# Patient Record
Sex: FEMALE | Race: BLACK OR AFRICAN AMERICAN | Employment: UNEMPLOYED | URBAN - METROPOLITAN AREA
[De-identification: names, ages, dates, MRNs, and addresses within clinical notes are randomized per-mention and may not be internally consistent; named-entity substitution may affect disease eponyms.]

---

## 2017-01-13 ENCOUNTER — OFFICE VISIT (OUTPATIENT)
Dept: FAMILY MEDICINE CLINIC | Age: 62
End: 2017-01-13

## 2017-01-13 VITALS
DIASTOLIC BLOOD PRESSURE: 82 MMHG | BODY MASS INDEX: 36.82 KG/M2 | HEIGHT: 65 IN | WEIGHT: 221 LBS | HEART RATE: 106 BPM | SYSTOLIC BLOOD PRESSURE: 140 MMHG | TEMPERATURE: 98.4 F | RESPIRATION RATE: 16 BRPM | OXYGEN SATURATION: 98 %

## 2017-01-13 DIAGNOSIS — Z23 ENCOUNTER FOR IMMUNIZATION: ICD-10-CM

## 2017-01-13 DIAGNOSIS — E55.9 HYPOVITAMINOSIS D: ICD-10-CM

## 2017-01-13 DIAGNOSIS — Z12.4 PAP SMEAR FOR CERVICAL CANCER SCREENING: ICD-10-CM

## 2017-01-13 DIAGNOSIS — E11.42 TYPE 2 DIABETES MELLITUS WITH DIABETIC POLYNEUROPATHY, WITH LONG-TERM CURRENT USE OF INSULIN (HCC): ICD-10-CM

## 2017-01-13 DIAGNOSIS — Z79.4 TYPE 2 DIABETES MELLITUS WITH DIABETIC POLYNEUROPATHY, WITH LONG-TERM CURRENT USE OF INSULIN (HCC): ICD-10-CM

## 2017-01-13 DIAGNOSIS — I10 ESSENTIAL HYPERTENSION: ICD-10-CM

## 2017-01-13 DIAGNOSIS — E11.9 DIABETES MELLITUS WITHOUT COMPLICATION (HCC): Primary | ICD-10-CM

## 2017-01-13 DIAGNOSIS — E78.00 PURE HYPERCHOLESTEROLEMIA: ICD-10-CM

## 2017-01-13 LAB — HBA1C MFR BLD HPLC: 7.3 %

## 2017-01-13 RX ORDER — SIMVASTATIN 10 MG/1
10 TABLET, FILM COATED ORAL
Qty: 90 TAB | Refills: 1 | Status: SHIPPED | OUTPATIENT
Start: 2017-01-13 | End: 2017-06-22 | Stop reason: SDUPTHER

## 2017-01-13 RX ORDER — METFORMIN HYDROCHLORIDE 1000 MG/1
1000 TABLET ORAL 2 TIMES DAILY WITH MEALS
Qty: 180 TAB | Refills: 1 | Status: SHIPPED | OUTPATIENT
Start: 2017-01-13 | End: 2017-06-22 | Stop reason: ALTCHOICE

## 2017-01-13 RX ORDER — PAROXETINE HYDROCHLORIDE 40 MG/1
40 TABLET, FILM COATED ORAL DAILY
Qty: 90 TAB | Refills: 1 | Status: SHIPPED | OUTPATIENT
Start: 2017-01-13 | End: 2017-06-22 | Stop reason: ALTCHOICE

## 2017-01-13 RX ORDER — AMITRIPTYLINE HYDROCHLORIDE 100 MG/1
100 TABLET, FILM COATED ORAL
Qty: 90 TAB | Refills: 1 | Status: SHIPPED | OUTPATIENT
Start: 2017-01-13 | End: 2017-06-22 | Stop reason: ALTCHOICE

## 2017-01-13 RX ORDER — MELOXICAM 15 MG/1
TABLET ORAL
Qty: 90 TAB | Refills: 1 | Status: SHIPPED | OUTPATIENT
Start: 2017-01-13 | End: 2017-06-22 | Stop reason: ALTCHOICE

## 2017-01-13 RX ORDER — GLIPIZIDE 10 MG/1
TABLET, FILM COATED, EXTENDED RELEASE ORAL
Qty: 180 TAB | Refills: 1 | Status: SHIPPED | OUTPATIENT
Start: 2017-01-13 | End: 2017-06-22 | Stop reason: SDUPTHER

## 2017-01-13 RX ORDER — QUETIAPINE FUMARATE 50 MG/1
TABLET, FILM COATED ORAL
Qty: 90 TAB | Refills: 1 | Status: SHIPPED | OUTPATIENT
Start: 2017-01-13 | End: 2017-06-22 | Stop reason: SDUPTHER

## 2017-01-13 RX ORDER — INSULIN GLARGINE 100 [IU]/ML
38 INJECTION, SOLUTION SUBCUTANEOUS DAILY
Qty: 30 ML | Refills: 3 | Status: SHIPPED | OUTPATIENT
Start: 2017-01-13 | End: 2017-06-22 | Stop reason: SDUPTHER

## 2017-01-13 RX ORDER — FENOFIBRATE 48 MG/1
145 TABLET, COATED ORAL DAILY
Qty: 90 TAB | Refills: 1 | Status: SHIPPED | OUTPATIENT
Start: 2017-01-13 | End: 2017-06-17 | Stop reason: ALTCHOICE

## 2017-01-13 RX ORDER — PANTOPRAZOLE SODIUM 40 MG/1
40 TABLET, DELAYED RELEASE ORAL DAILY
Qty: 90 TAB | Refills: 1 | Status: SHIPPED | OUTPATIENT
Start: 2017-01-13 | End: 2017-06-22 | Stop reason: SDUPTHER

## 2017-01-13 RX ORDER — ISOPROPYL ALCOHOL 70 ML/100ML
SWAB TOPICAL
Qty: 200 PAD | Refills: 2 | Status: SHIPPED | OUTPATIENT
Start: 2017-01-13 | End: 2018-05-01 | Stop reason: SDUPTHER

## 2017-01-13 RX ORDER — LISINOPRIL AND HYDROCHLOROTHIAZIDE 10; 12.5 MG/1; MG/1
1 TABLET ORAL DAILY
Qty: 90 TAB | Refills: 1 | Status: SHIPPED | OUTPATIENT
Start: 2017-01-13 | End: 2017-06-22 | Stop reason: ALTCHOICE

## 2017-01-13 NOTE — MR AVS SNAPSHOT
Visit Information Date & Time Provider Department Dept. Phone Encounter #  
 1/13/2017  8:15 AM Cher Ashford, 3 [facility] [phone] [id number] Upcoming Health Maintenance Date Due Hepatitis C Screening 1955 Pneumococcal 19-64 Medium Risk (1 of 1 - PPSV23) 2/13/1976 FOBT Q 1 YEAR AGE 50-75 2/13/2007 FOOT EXAM Q1 10/7/2014 BREAST CANCER SCRN MAMMOGRAM 2/5/2015 MICROALBUMIN Q1 3/10/2015 INFLUENZA AGE 9 TO ADULT 8/1/2016 EYE EXAM RETINAL OR DILATED Q1 11/18/2016 PAP AKA CERVICAL CYTOLOGY 3/18/2017 LIPID PANEL Q1 4/18/2017 HEMOGLOBIN A1C Q6M 7/13/2017 DTaP/Tdap/Td series (2 - Td) 1/13/2027 Allergies as of 1/13/2017  Review Complete On: 1/13/2017 By: Jayjay Redding LPN Severity Noted Reaction Type Reactions Amaryl [Glimepiride]  03/22/2012    Nausea Only Penicillins  10/25/2010    Nausea and Vomiting Current Immunizations  Reviewed on 6/17/2016 Name Date Pneumococcal Polysaccharide (PPSV-23)  Incomplete Not reviewed this visit You Were Diagnosed With   
  
 Codes Comments Diabetes mellitus without complication ([facility]    -  Primary ICD-10-CM: E11.9 ICD-9-CM: 250.00 Type 2 diabetes mellitus with diabetic polyneuropathy, with long-term current use of insulin (HCC)     ICD-10-CM: E11.42, Z79.4 ICD-9-CM: 250.60, 357.2, V58.67 Essential hypertension     ICD-10-CM: I10 
ICD-9-CM: 401.9 Pure hypercholesterolemia     ICD-10-CM: E78.00 ICD-9-CM: 272.0 Pap smear for cervical cancer screening     ICD-10-CM: Z12.4 ICD-9-CM: V76.2 Encounter for immunization     ICD-10-CM: C37 ICD-9-CM: V03.89 Vitals BP Pulse Temp Resp Height(growth percentile) Weight(growth percentile) 140/82 (!) 106 98.4 °F (36.9 °C) 16 5' 5\" (1.651 m) 221 lb (100.2 kg) SpO2 BMI OB Status Smoking Status 98% 36.78 kg/m2 Postmenopausal Current Every Day Smoker Vitals History BMI and BSA Data Body Mass Index Body Surface Area 36.78 kg/m 2 2.14 m 2 Preferred Pharmacy Pharmacy Name Phone Gaby Flores [address], [address] [phone] Your Updated Medication List  
  
   
This list is accurate as of: 1/13/17  8:46 AM.  Always use your most recent med list.  
  
  
  
  
 alcohol swabs Padm Commonly known as:  ALCOHOL PADS Dispense 1 box 200 count. Dx 250.00. Twice daily use  
  
 amitriptyline 100 mg tablet Commonly known as:  ELAVIL Take 1 Tab by mouth nightly. fenofibrate nanocrystallized 48 mg tablet Commonly known as:  [employer] Take 3 Tabs by mouth daily. glipiZIDE SR 10 mg CR tablet Commonly known as:  GLUCOTROL XL  
TAKE 1 TABLET BY MOUTH TWICE DAILY  
  
 insulin glargine 100 unit/mL (3 mL) pen Commonly known as:  LANTUS SOLOSTAR  
38 Units by SubCUTAneous route daily. Insulin Needles (Disposable) 32 gauge x 5/32\" Ndle Commonly known as:  Rosalva Pen Needle For once daily use  
  
 lisinopril-hydroCHLOROthiazide 10-12.5 mg per tablet Commonly known as:  Helyn Blinks Take 1 Tab by mouth daily. meloxicam 15 mg tablet Commonly known as:  MOBIC  
TAKE 1 TABLET BY MOUTH DAILY  
  
 metFORMIN 1,000 mg tablet Commonly known as:  GLUCOPHAGE Take 1 Tab by mouth two (2) times daily (with meals). pantoprazole 40 mg tablet Commonly known as:  PROTONIX Take 1 Tab by mouth daily. PARoxetine 40 mg tablet Commonly known as:  PAXIL Take 1 Tab by mouth daily. QUEtiapine 50 mg tablet Commonly known as:  SEROquel TAKE 1 TABLET BY MOUTH EVERY EVENING AS NEEDED  
  
 simvastatin 10 mg tablet Commonly known as:  ZOCOR Take 1 Tab by mouth nightly. VITAMIN D3 1,000 unit tablet Generic drug:  cholecalciferol Take  by mouth daily. Prescriptions Sent to Pharmacy Refills glipiZIDE SR (GLUCOTROL XL) 10 mg CR tablet 1 Sig: TAKE 1 TABLET BY MOUTH TWICE DAILY Class: Normal  
 Pharmacy: [facility] Ph #: [phone]  
 meloxicam (MOBIC) 15 mg tablet 1 Sig: TAKE 1 TABLET BY MOUTH DAILY Class: Normal  
 Pharmacy: [facility] [address] [place] [address] Rd, [address] [place] [address] Ph #: [phone] QUEtiapine (SEROQUEL) 50 mg tablet 1 Sig: TAKE 1 TABLET BY MOUTH EVERY EVENING AS NEEDED Class: Normal  
 Pharmacy: [facility] [address] Ph #: [phone]  
 metFORMIN (GLUCOPHAGE) 1,000 mg tablet 1 Sig: Take 1 Tab by mouth two (2) times daily (with meals). Class: Normal  
 Pharmacy: [facility] [address] [place] [address], [address] [place] [address] Ph #: [phone] Route: Oral  
 amitriptyline (ELAVIL) 100 mg tablet 1 Sig: Take 1 Tab by mouth nightly. Class: Normal  
 Pharmacy: [facility] [address] [place] [address], [address] [place] [address] Ph #: [phone] Route: Oral  
 PARoxetine (PAXIL) 40 mg tablet 1 Sig: Take 1 Tab by mouth daily. Class: Normal  
 Pharmacy: [facility] [address] [place] [address], [address] [place] [address] Ph #: [phone] Route: Oral  
 lisinopril-hydroCHLOROthiazide (PRINZIDE, ZESTORETIC) 10-12.5 mg per tablet 1 Sig: Take 1 Tab by mouth daily. Class: Normal  
 Pharmacy: [facility] [address] [place] [address], [address] [place] [address] Ph #: [phone] Route: Oral  
 fenofibrate nanocrystallized (TRICOR) 48 mg tablet 1 Sig: Take 3 Tabs by mouth daily.   
 Class: Normal  
 Pharmacy: [facility] [address] [place] [address] Rd, [address] [place] [address] Ph #: [phone] Route: Oral  
 simvastatin (ZOCOR) 10 mg tablet 1 Sig: Take 1 Tab by mouth nightly. Class: Normal  
 Pharmacy: [facility] [address] [place] [address] Rd, [address] [place] [address] Ph #: [phone] Route: Oral  
 alcohol swabs (ALCOHOL PADS) padm 2 Sig: Dispense 1 box 200 count. Dx 250.00. Twice daily use Class: Normal  
 Pharmacy: [facility] [address], [address] [place] [address] Ph #: [phone] insulin glargine (LANTUS SOLOSTAR) 100 unit/mL (3 mL) pen 3 Si Units by SubCUTAneous route daily. Class: Normal  
 Pharmacy: [address], [address] [place] [address] Ph #: [phone] Route: SubCUTAneous  
 pantoprazole (PROTONIX) 40 mg tablet 1 Sig: Take 1 Tab by mouth daily. Class: Normal  
 Pharmacy: [address], [address] [place] [address] Ph #: [phone] Route: Oral  
  
We Performed the Following ADMIN PNEUMOCOCCAL VACCINE [ HCP] AMB POC HEMOGLOBIN A1C [82013 CPT(R)]  DIABETES FOOT EXAM [HM7 Custom] PNEUMOCOCCAL POLYSACCHARIDE VACCINE, 23-VALENT, ADULT OR IMMUNOSUPPRESSED PT DOSE, [16561 CPT(R)] REFERRAL TO OBSTETRICS AND GYNECOLOGY [REF51 Custom] REFERRAL TO OPHTHALMOLOGY [REF57 Custom] Comments: PLEASE GET NOTE FROM [facility] Referral Information Referral ID Referred By Referred To  
  
 [id number] Caryn ERWIN Not Available Visits Status Start Date End Date 1 New Request 17 If your referral has a status of pending review or denied, additional information will be sent to support the outcome of this decision. Referral ID Referred By Referred To  
 [id number] Sandoval ERWIN Not Available Visits Status Start Date End Date 1 New Request 1/13/17 1/13/18 If your referral has a status of pending review or denied, additional information will be sent to support the outcome of this decision. Introducing [facility]! Scooby Reich introduces Partly patient portal. Now you can access parts of your medical record, email your doctor's office, and request medication refills online. 1. In your internet browser, go to [email or web address] 2. Click on the First Time User? Click Here link in the Sign In box. You will see the New Member Sign Up page. 3. Enter your Partly Access Code exactly as it appears below. You will not need to use this code after youve completed the sign-up process. If you do not sign up before the expiration date, you must request a new code. · Partly Access Code: [email or web address] Expires: 4/13/2017  8:46 AM 
 
4. Enter the last four digits of your Social Security Number (xxxx) and Date of Birth (mm/dd/yyyy) as indicated and click Submit. You will be taken to the next sign-up page. 5. Create a Partly ID. This will be your Partly login ID and cannot be changed, so think of one that is secure and easy to remember. 6. Create a Partly password. You can change your password at any time. 7. Enter your Password Reset Question and Answer. This can be used at a later time if you forget your password. 8. Enter your e-mail address. You will receive e-mail notification when new information is available in [address] 9. Click Sign Up. You can now view and download portions of your medical record. 10. Click the Download Summary menu link to download a portable copy of your medical information. If you have questions, please visit the Frequently Asked Questions section of the Partly website.  Remember, Partly is NOT to be used for urgent needs. For medical emergencies, dial 911. Now available from your iPhone and Android! Please provide this summary of care documentation to your next provider. Your primary care clinician is listed as Kyung Vo. If you have any questions after today's visit, please call [phone].

## 2017-01-13 NOTE — PROGRESS NOTES
Asim Linton is a 63 y.o. female here today with complaints of rectal and vaginal pressure. 1. Have you been to the ER, urgent care clinic since your last visit? Hospitalized since your last visit? No    2. Have you seen or consulted any other health care providers outside of the [address] [place] [address] since your last visit? Include any pap smears or colon screening.  No

## 2017-01-13 NOTE — PATIENT INSTRUCTIONS

## 2017-01-13 NOTE — PROGRESS NOTES
Assessment/Plan:    *Diagnoses and all orders for this visit:    Diabetes mellitus without complication (HCC)  -     AMB POC HEMOGLOBIN A1C  -     glipiZIDE SR (GLUCOTROL XL) 10 mg CR tablet; TAKE 1 TABLET BY MOUTH TWICE DAILY  -     metFORMIN (GLUCOPHAGE) 1,000 mg tablet; Take 1 Tab by mouth two (2) times daily (with meals). -     alcohol swabs (ALCOHOL PADS) padm; Dispense 1 box 200 count. Dx 250.00. Twice daily use  -     insulin glargine (LANTUS SOLOSTAR) 100 unit/mL (3 mL) pen; 38 Units by SubCUTAneous route daily. -      DIABETES FOOT EXAM  -     REFERRAL TO OPHTHALMOLOGY  -     CBC WITH AUTOMATED DIFF; Future  -     HEPATIC FUNCTION PANEL; Future  -     LIPID PANEL; Future  -     METABOLIC PANEL, BASIC; Future  -     TSH 3RD GENERATION; Future  -     T4, FREE; Future  -     URINALYSIS W/ RFLX MICROSCOPIC; Future  -     HEMOGLOBIN A1C W/O EAG; Future  -     MICROALBUMIN, UR, RAND W/ MICROALBUMIN/CREA RATIO; Future    Type 2 diabetes mellitus with diabetic polyneuropathy, with long-term current use of insulin (HCC)    Essential hypertension    Pure hypercholesterolemia  -     fenofibrate nanocrystallized (TRICOR) 48 mg tablet; Take 3 Tabs by mouth daily. -     simvastatin (ZOCOR) 10 mg tablet; Take 1 Tab by mouth nightly. Pap smear for cervical cancer screening  -     REFERRAL TO OBSTETRICS AND GYNECOLOGY    Encounter for immunization  -     Pneumococcal Polysaccharide vaccine, 23-Valent, Adult or Immunocompromised  -     ADMIN PNEUMOCOCCAL VACCINE  Medicare Injection Admin Charge    Hypovitaminosis D  -     VITAMIN D, 25 HYDROXY; Future    Other orders  -     meloxicam (MOBIC) 15 mg tablet; TAKE 1 TABLET BY MOUTH DAILY  -     QUEtiapine (SEROQUEL) 50 mg tablet; TAKE 1 TABLET BY MOUTH EVERY EVENING AS NEEDED  -     amitriptyline (ELAVIL) 100 mg tablet; Take 1 Tab by mouth nightly. -     PARoxetine (PAXIL) 40 mg tablet; Take 1 Tab by mouth daily.   -     lisinopril-hydroCHLOROthiazide (PRINZIDE, ZESTORETIC) 10-12.5 mg per tablet; Take 1 Tab by mouth daily. -     pantoprazole (PROTONIX) 40 mg tablet; Take 1 Tab by mouth daily.  -     Ferrous Sulfate (SLOW FE) 47.5 mg iron TbER tablet; Take 1 Tab by mouth daily. Physical in June. BW. The plan was discussed with the patient. The patient verbalized understanding and is in agreement with the plan. All medication potential side effects were discussed with the patient.    -------------------------------------------------------------------------------------------------------------------        Ad Pierce is a 63 y.o. female and presents with No chief complaint on file. Subjective:  Pt here for f/u. DM: so much better, A1c in office at 7.3%. HTN: stable. HLD: controlled on last BW. ROS:  Constitutional: No recent weight change. No weakness/fatigue. No f/c. Skin: No rashes, change in nails/hair, itching   HENT: No HA, dizziness. No hearing loss/tinnitus. No nasal congestion/discharge. Eyes: No change in vision, double/blurred vision or eye pain/redness. Cardiovascular: No CP/palpitations. No CORADO/orthopnea/PND. Respiratory: No cough/sputum, dyspnea, wheezing. Gastointestinal: No dysphagia, reflux. No n/v. No constipation/diarrhea. No melena/rectal bleeding. Genitourinary: No dysuria, urinary hesitancy, nocturia, hematuria. No incontinence. Musculoskeletal: No joint pain/stiffness. No muscle pain/tenderness. Endo: No heat/cold intolerance, no polyuria/polydypsia. Heme: No h/o anemia. No easy bleeding/bruising. Allergy/Immunology: No seasonal rhinitis. Denies frequent colds, sinus/ear infections. Neurological: No seizures/numbness/weakness. No paresthesias. Psychiatric:  No depression, anxiety. The problem list was updated as a part of today's visit.   Patient Active Problem List   Diagnosis Code    HTN (hypertension) I10    Insomnia G47.00    GERD (gastroesophageal reflux disease) K21.9  Schizophrenia ([facility] F20.9    DM (diabetes mellitus) ([facility] E11.9    Hyperlipidemia E78.5    Diabetic retinopathy ([facility] E11.319    Cataract H26.9    Glaucoma H40.9    Sleep apnea, obstructive G47.33    Diabetic neuropathy (HCC) E11.40    Heel spur M77.30    Depression F32.9       The PSH, FH were reviewed. SH:  Social History   Substance Use Topics    Smoking status: Current Every Day Smoker     Packs/day: 0.50     Years: 21.00     Types: Cigarettes    Smokeless tobacco: Current User    Alcohol use No       Medications/Allergies:  Current Outpatient Prescriptions on File Prior to Visit   Medication Sig Dispense Refill    cholecalciferol, vitamin d3, (VITAMIN D) 1,000 unit tablet Take  by mouth daily.  Insulin Needles, Disposable, (KENDALL PEN NEEDLE) 32 gauge x 5/32\" ndle For once daily use 100 Pen Needle 1     No current facility-administered medications on file prior to visit. Allergies   Allergen Reactions    Amaryl [Glimepiride] Nausea Only    Penicillins Nausea and Vomiting         Health Maintenance:   Health Maintenance   Topic Date Due    Hepatitis C Screening  1955    Pneumococcal 19-64 Medium Risk (1 of 1 - PPSV23) 02/13/1976    FOBT Q 1 YEAR AGE 50-75  02/13/2007    FOOT EXAM Q1  10/07/2014    BREAST CANCER SCRN MAMMOGRAM  02/05/2015    MICROALBUMIN Q1  03/10/2015    INFLUENZA AGE 9 TO ADULT  08/01/2016    EYE EXAM RETINAL OR DILATED Q1  11/18/2016    PAP AKA CERVICAL CYTOLOGY  03/18/2017    LIPID PANEL Q1  04/18/2017    HEMOGLOBIN A1C Q6M  07/13/2017    DTaP/Tdap/Td series (2 - Td) 01/13/2027       Objective:  Visit Vitals    /82    Pulse (!) 106    Temp 98.4 °F (36.9 °C)    Resp 16    Ht 5' 5\" (1.651 m)    Wt 221 lb (100.2 kg)    SpO2 98%    BMI 36.78 kg/m2          Nurses notes and VS reviewed.       Physical Examination: General appearance - alert, well appearing, and in no distress  Chest - clear to auscultation, no wheezes, rales or rhonchi, symmetric air entry  Heart - normal rate, regular rhythm, normal S1, S2, no murmurs, rubs, clicks or gallops  Abdomen - soft, nontender, nondistended, no masses or organomegaly  Extremities - peripheral pulses normal, no pedal edema, no clubbing or cyanosis  Diabetic foot exam:     Left:    Filament test normal sensation with micro filament   Pulse DP: 2+ (normal)   Pulse PT: 2+ (normal)   Deformities: None  Right:    Filament test normal sensation with micro filament   Pulse DP: 2+ (normal)   Pulse PT: 2+ (normal)   Deformities: None        Labwork and Ancillary Studies:    CBC w/Diff  Lab Results   Component Value Date/Time    WBC 8.7 04/18/2016 11:55 AM    HGB 11.9 04/18/2016 11:55 AM    PLATELET 979 15/01/3065 11:55 AM         Basic Metabolic Profile  Lab Results   Component Value Date/Time    Sodium 137 04/18/2016 11:55 AM    Potassium 4.4 04/18/2016 11:55 AM    Chloride 98 04/18/2016 11:55 AM    CO2 27 04/18/2016 11:55 AM    Anion gap 12 04/18/2016 11:55 AM    Glucose 212 04/18/2016 11:55 AM    BUN 20 04/18/2016 11:55 AM    Creatinine 1.15 04/18/2016 11:55 AM    BUN/Creatinine ratio 17 04/18/2016 11:55 AM    GFR est AA 59 04/18/2016 11:55 AM    GFR est non-AA 48 04/18/2016 11:55 AM    Calcium 9.0 04/18/2016 11:55 AM         LFT  Lab Results   Component Value Date/Time    ALT 20 04/18/2016 11:55 AM    AST 13 04/18/2016 11:55 AM    Alk.  phosphatase 105 04/18/2016 11:55 AM    Bilirubin, direct <0.1 04/18/2016 11:55 AM    Bilirubin, total 0.2 04/18/2016 11:55 AM         Cholesterol  Lab Results   Component Value Date/Time    Cholesterol, total 148 04/18/2016 11:55 AM    HDL Cholesterol 40 04/18/2016 11:55 AM    LDL, calculated 55.6 04/18/2016 11:55 AM    Triglyceride 262 04/18/2016 11:55 AM    CHOL/HDL Ratio 3.7 04/18/2016 11:55 AM

## 2017-06-14 ENCOUNTER — TELEPHONE (OUTPATIENT)
Dept: FAMILY MEDICINE CLINIC | Age: 62
End: 2017-06-14

## 2017-06-14 ENCOUNTER — OFFICE VISIT (OUTPATIENT)
Dept: FAMILY MEDICINE CLINIC | Age: 62
End: 2017-06-14

## 2017-06-14 VITALS
BODY MASS INDEX: 36.61 KG/M2 | DIASTOLIC BLOOD PRESSURE: 56 MMHG | OXYGEN SATURATION: 98 % | SYSTOLIC BLOOD PRESSURE: 100 MMHG | HEIGHT: 63 IN | TEMPERATURE: 98.1 F | RESPIRATION RATE: 16 BRPM | WEIGHT: 206.6 LBS | HEART RATE: 90 BPM

## 2017-06-14 DIAGNOSIS — Z12.39 SCREENING FOR MALIGNANT NEOPLASM OF BREAST: ICD-10-CM

## 2017-06-14 DIAGNOSIS — E11.42 TYPE 2 DIABETES MELLITUS WITH DIABETIC POLYNEUROPATHY, WITH LONG-TERM CURRENT USE OF INSULIN (HCC): ICD-10-CM

## 2017-06-14 DIAGNOSIS — Z12.11 COLON CANCER SCREENING: ICD-10-CM

## 2017-06-14 DIAGNOSIS — Z79.4 TYPE 2 DIABETES MELLITUS WITH DIABETIC POLYNEUROPATHY, WITH LONG-TERM CURRENT USE OF INSULIN (HCC): ICD-10-CM

## 2017-06-14 DIAGNOSIS — Z13.6 SCREENING FOR ISCHEMIC HEART DISEASE: ICD-10-CM

## 2017-06-14 DIAGNOSIS — Z12.4 SCREENING FOR MALIGNANT NEOPLASM OF CERVIX: ICD-10-CM

## 2017-06-14 DIAGNOSIS — Z00.00 ROUTINE GENERAL MEDICAL EXAMINATION AT A HEALTH CARE FACILITY: Primary | ICD-10-CM

## 2017-06-14 DIAGNOSIS — E78.00 PURE HYPERCHOLESTEROLEMIA: ICD-10-CM

## 2017-06-14 DIAGNOSIS — I10 ESSENTIAL HYPERTENSION: ICD-10-CM

## 2017-06-14 DIAGNOSIS — Z78.0 POSTMENOPAUSAL: ICD-10-CM

## 2017-06-14 DIAGNOSIS — Z13.820 OSTEOPOROSIS SCREENING: ICD-10-CM

## 2017-06-14 DIAGNOSIS — Z13.39 SCREENING FOR ALCOHOLISM: ICD-10-CM

## 2017-06-14 LAB
MICROALBUMIN UR TEST STR-MCNC: 150 MG/L
MICROALBUMIN/CREAT RATIO POC: NORMAL MG/G

## 2017-06-14 RX ORDER — GEMFIBROZIL 600 MG/1
600 TABLET, FILM COATED ORAL 2 TIMES DAILY
COMMUNITY
End: 2017-06-22 | Stop reason: ALTCHOICE

## 2017-06-14 NOTE — PROGRESS NOTES
This is a Subsequent Medicare Annual Wellness Visit providing Personalized Prevention Plan Services (PPPS) (Performed 12 months after initial AWV and PPPS )    I have reviewed the patient's medical history in detail and updated the computerized patient record. History     Past Medical History:   Diagnosis Date    Cataract     Depression 1/13/2015    Diabetes ([facility]     Diabetic neuropathy ([facility] 9/1/2013    Diabetic retinopathy ([facility] 3/2/2012    DM (diabetes mellitus) ([facility] 3/2/2012    GERD (gastroesophageal reflux disease) 4/21/2011    Glaucoma     Heel spur 11/24/2014    HTN (hypertension) 4/21/2011    Hyperlipidemia 3/2/2012    Insomnia 4/21/2011    Schizophrenia ([facility] 4/21/2011    Sleep apnea, obstructive 9/1/2013      Past Surgical History:   Procedure Laterality Date    BIOPSY OF BREAST, INCISIONAL  1970    left benign    HX OPEN CHOLECYSTECTOMY       Current Outpatient Prescriptions   Medication Sig Dispense Refill    SITagliptin (JANUVIA) 100 mg tablet Take 100 mg by mouth daily.  bimatoprost (LUMIGAN) 0.01 % ophthalmic drops Administer 1 Drop to both eyes every evening.  gemfibrozil (LOPID) 600 mg tablet Take 600 mg by mouth two (2) times a day.  KENDALL PEN NEEDLE 32 gauge x 5/32\" ndle USE AS DIRECTED ONCE EVERY  Pen Needle 1    glipiZIDE SR (GLUCOTROL XL) 10 mg CR tablet TAKE 1 TABLET BY MOUTH TWICE DAILY 180 Tab 1    meloxicam (MOBIC) 15 mg tablet TAKE 1 TABLET BY MOUTH DAILY 90 Tab 1    QUEtiapine (SEROQUEL) 50 mg tablet TAKE 1 TABLET BY MOUTH EVERY EVENING AS NEEDED 90 Tab 1    metFORMIN (GLUCOPHAGE) 1,000 mg tablet Take 1 Tab by mouth two (2) times daily (with meals). 180 Tab 1    amitriptyline (ELAVIL) 100 mg tablet Take 1 Tab by mouth nightly. 90 Tab 1    PARoxetine (PAXIL) 40 mg tablet Take 1 Tab by mouth daily. 90 Tab 1    lisinopril-hydroCHLOROthiazide (PRINZIDE, ZESTORETIC) 10-12.5 mg per tablet Take 1 Tab by mouth daily.  90 Tab 1    fenofibrate nanocrystallized (TRICOR) 48 mg tablet Take 3 Tabs by mouth daily. 90 Tab 1    simvastatin (ZOCOR) 10 mg tablet Take 1 Tab by mouth nightly. 90 Tab 1    alcohol swabs (ALCOHOL PADS) padm Dispense 1 box 200 count. Dx 250.00. Twice daily use 200 Pad 2    insulin glargine (LANTUS SOLOSTAR) 100 unit/mL (3 mL) pen 38 Units by SubCUTAneous route daily. 30 mL 3    pantoprazole (PROTONIX) 40 mg tablet Take 1 Tab by mouth daily. 90 Tab 1    Ferrous Sulfate (SLOW FE) 47.5 mg iron TbER tablet Take 1 Tab by mouth daily. 90 Tab 1    cholecalciferol, vitamin d3, (VITAMIN D) 1,000 unit tablet Take  by mouth daily. Allergies   Allergen Reactions    Amaryl [Glimepiride] Nausea Only    Penicillins Nausea and Vomiting     History reviewed. No pertinent family history. Social History   Substance Use Topics    Smoking status: Current Every Day Smoker     Packs/day: 0.50     Years: 21.00     Types: Cigarettes    Smokeless tobacco: Current User    Alcohol use No     Patient Active Problem List   Diagnosis Code    HTN (hypertension) I10    Insomnia G47.00    GERD (gastroesophageal reflux disease) K21.9    Schizophrenia ([facility] F20.9    DM (diabetes mellitus) ([facility] E11.9    Hyperlipidemia E78.5    Diabetic retinopathy ([facility] E11.319    Cataract H26.9    Glaucoma H40.9    Sleep apnea, obstructive G47.33    Diabetic neuropathy (HCC) E11.40    Heel spur M77.30    Depression F32.9       Depression Risk Factor Screening:     PHQ over the last two weeks 6/14/2017   PHQ Not Done -   Little interest or pleasure in doing things Not at all   Feeling down, depressed or hopeless Not at all   Total Score PHQ 2 0     Alcohol Risk Factor Screening: On any occasion during the past 3 months, have you had more than 3 drinks containing alcohol? No    Do you average more than 7 drinks per week? No      Functional Ability and Level of Safety:     Hearing Loss   none    Activities of Daily Living   Self-care.    Requires assistance with: no ADLs    Fall Risk     Fall Risk Assessment, last 12 mths 6/14/2017   Able to walk? Yes   Fall in past 12 months? No     Abuse Screen   Patient is not abused    Review of Systems   Pertinent items are noted in HPI. Physical Examination     Evaluation of Cognitive Function:  Mood/affect:  happy  Appearance: age appropriate  Family member/caregiver input: daughter with her today. Visit Vitals    /56    Pulse 90    Temp 98.1 °F (36.7 °C)    Resp 16    Ht 5' 3\" (1.6 m)    Wt 206 lb 9.6 oz (93.7 kg)    SpO2 98%    BMI 36.6 kg/m2     General appearance: alert, cooperative, no distress, appears stated age  Lungs: clear to auscultation bilaterally  Heart: regular rate and rhythm, S1, S2 normal, no murmur, click, rub or gallop  Abdomen: soft, non-tender. Bowel sounds normal. No masses,  no organomegaly  Extremities: extremities normal, atraumatic, no cyanosis or edema  Pulses: 2+ and symmetric    Patient Care Team:  Diane Bro MD as PCP - General (Internal Medicine)    Advice/Referrals/Counseling   Education and counseling provided:  Are appropriate based on today's review and evaluation      Assessment/Plan       ICD-10-CM ICD-9-CM    1. Routine general medical examination at a health care facility Z00.00 V70.0    2. Screening for malignant neoplasm of cervix Z12.4 V76.2    3. Screening for malignant neoplasm of breast Z12.39 V76.10 VANCE MAMMO BI SCREENING INCL CAD   4. Postmenopausal Z78.0 V49.81 DEXA BONE DENSITY STUDY AXIAL   5. Osteoporosis screening Z13.820 V82.81 DEXA BONE DENSITY STUDY AXIAL   6. Colon cancer screening Z12.11 V76.51 OCCULT BLOOD, STOOL   7. Type 2 diabetes mellitus with diabetic polyneuropathy, with long-term current use of insulin (HCC) E11.42 250.60 AMB POC URINE, MICROALBUMIN, SEMIQUANTITATIVE    Z79.4 357.2 REFERRAL TO PODIATRY     V58.67    8. Essential hypertension I10 401.9    9.  Pure hypercholesterolemia E78.00 272.0    10. Screening for alcoholism Z13.89 V79.1    11. Screening for ischemic heart disease Z13.6 V81.0    . Pt will return for labs tomorrow. She was reminded again to get her eye exam done. She will make her podiatry appt.

## 2017-06-14 NOTE — MR AVS SNAPSHOT
Visit Information Date & Time Provider Department Dept. Phone Encounter #  
 6/14/2017 11:15 AM Geovanni Linares, 3 [facility] [phone] [id number] Upcoming Health Maintenance Date Due Hepatitis C Screening 1955 FOBT Q 1 YEAR AGE 50-75 2/13/2007 BREAST CANCER SCRN MAMMOGRAM 2/5/2015 MICROALBUMIN Q1 3/10/2015 EYE EXAM RETINAL OR DILATED Q1 11/18/2016 PAP AKA CERVICAL CYTOLOGY 3/18/2017 LIPID PANEL Q1 4/18/2017 HEMOGLOBIN A1C Q6M 7/13/2017 INFLUENZA AGE 9 TO ADULT 8/1/2017 FOOT EXAM Q1 1/13/2018 DTaP/Tdap/Td series (2 - Td) 1/13/2027 Allergies as of 6/14/2017  Review Complete On: 6/14/2017 By: Geovanni Linares MD  
  
 Severity Noted Reaction Type Reactions Amaryl [Glimepiride]  03/22/2012    Nausea Only Penicillins  10/25/2010    Nausea and Vomiting Current Immunizations  Reviewed on 6/17/2016 Name Date Pneumococcal Polysaccharide (PPSV-23) 1/13/2017  9:02 AM  
  
 Not reviewed this visit You Were Diagnosed With   
  
 Codes Comments Postmenopausal    -  Primary ICD-10-CM: Z78.0 ICD-9-CM: V49.81 Screening for malignant neoplasm of cervix     ICD-10-CM: Z12.4 ICD-9-CM: V76.2 Screening for malignant neoplasm of breast     ICD-10-CM: Z12.39 
ICD-9-CM: V76.10 Osteoporosis screening     ICD-10-CM: Z13.820 ICD-9-CM: V82.81 Colon cancer screening     ICD-10-CM: Z12.11 ICD-9-CM: V76.51 Type 2 diabetes mellitus with diabetic polyneuropathy, with long-term current use of insulin (HCC)     ICD-10-CM: E11.42, Z79.4 ICD-9-CM: 250.60, 357.2, V58.67 Essential hypertension     ICD-10-CM: I10 
ICD-9-CM: 401.9 Pure hypercholesterolemia     ICD-10-CM: E78.00 ICD-9-CM: 272.0 Routine general medical examination at a health care facility     ICD-10-CM: Z00.00 ICD-9-CM: V70.0 Screening for alcoholism     ICD-10-CM: Z13.89 ICD-9-CM: V79.1 Screening for ischemic heart disease     ICD-10-CM: Z13.6 ICD-9-CM: V81.0 Vitals BP Pulse Temp Resp Height(growth percentile) Weight(growth percentile) 100/56 90 98.1 °F (36.7 °C) 16 5' 3\" (1.6 m) 206 lb 9.6 oz (93.7 kg) SpO2 BMI OB Status Smoking Status 98% 36.6 kg/m2 Postmenopausal Current Every Day Smoker Vitals History BMI and BSA Data Body Mass Index Body Surface Area  
 36.6 kg/m 2 2.04 m 2 Preferred Pharmacy Pharmacy Name Phone [facility] [address], [address] [phone] Your Updated Medication List  
  
   
This list is accurate as of: 6/14/17 12:06 PM.  Always use your most recent med list.  
  
  
  
  
 alcohol swabs Padm Commonly known as:  ALCOHOL PADS Dispense 1 box 200 count. Dx 250.00. Twice daily use  
  
 amitriptyline 100 mg tablet Commonly known as:  ELAVIL Take 1 Tab by mouth nightly. bimatoprost 0.01 % ophthalmic drops Commonly known as:  LUMIGAN Administer 1 Drop to both eyes every evening. fenofibrate nanocrystallized 48 mg tablet Commonly known as:  [employer] Take 3 Tabs by mouth daily. Ferrous Sulfate 47.5 mg iron Tber tablet Commonly known as:  SLOW FE Take 1 Tab by mouth daily. gemfibrozil 600 mg tablet Commonly known as:  LOPID Take 600 mg by mouth two (2) times a day. glipiZIDE SR 10 mg CR tablet Commonly known as:  GLUCOTROL XL  
TAKE 1 TABLET BY MOUTH TWICE DAILY  
  
 insulin glargine 100 unit/mL (3 mL) Inpn Commonly known as:  LANTUS SOLOSTAR  
38 Units by SubCUTAneous route daily. JANUVIA 100 mg tablet Generic drug:  SITagliptin Take 100 mg by mouth daily. lisinopril-hydroCHLOROthiazide 10-12.5 mg per tablet Commonly known as:  Rosaland Copas Take 1 Tab by mouth daily. meloxicam 15 mg tablet Commonly known as:  MOBIC  
TAKE 1 TABLET BY MOUTH DAILY metFORMIN 1,000 mg tablet Commonly known as:  GLUCOPHAGE Take 1 Tab by mouth two (2) times daily (with meals). Rosalva Pen Needle 32 gauge x 5/32\" Ndle Generic drug:  Insulin Needles (Disposable) USE AS DIRECTED ONCE EVERY DAY  
  
 pantoprazole 40 mg tablet Commonly known as:  PROTONIX Take 1 Tab by mouth daily. PARoxetine 40 mg tablet Commonly known as:  PAXIL Take 1 Tab by mouth daily. QUEtiapine 50 mg tablet Commonly known as:  SEROquel TAKE 1 TABLET BY MOUTH EVERY EVENING AS NEEDED  
  
 simvastatin 10 mg tablet Commonly known as:  ZOCOR Take 1 Tab by mouth nightly. VITAMIN D3 1,000 unit tablet Generic drug:  cholecalciferol Take  by mouth daily. We Performed the Following AMB POC URINE, MICROALBUMIN, SEMIQUANTITATIVE [78010 CPT(R)] REFERRAL TO PODIATRY [REF90 Custom] To-Do List   
 06/14/2017 Imaging:  DEXA BONE DENSITY STUDY AXIAL   
  
 06/14/2017 Imaging:  VANCE MAMMO BI SCREENING INCL CAD   
  
 06/14/2017 Lab:  OCCULT BLOOD, STOOL Referral Information Referral ID Referred By Referred To  
  
 [id number] Isaías ERWIN Not Available Visits Status Start Date End Date 1 New Request 6/14/17 6/14/18 If your referral has a status of pending review or denied, additional information will be sent to support the outcome of this decision. Patient Instructions Medicare Part B Preventive Services Limitations Recommendation Scheduled Bone Mass Measurement 
(age 72 & older, biennial) Requires diagnosis related to osteoporosis or estrogen deficiency. Biennial benefit unless patient has history of long-term glucocorticoid tx or baseline is needed because initial test was by other method Cardiovascular Screening Blood Tests (every 5 years) Total cholesterol, HDL, Triglycerides Order as a panel if possible Colorectal Cancer Screening 
-Fecal occult blood test (annual) -Flexible sigmoidoscopy (5y) 
-Screening colonoscopy (10y) -Barium Enema Counseling to Prevent Tobacco Use (up to 8 sessions per year) - Counseling greater than 3 and up to 10 minutes - Counseling greater than 10 minutes Patients must be asymptomatic of tobacco-related conditions to receive as preventive service Diabetes Screening Tests (at least every 3 years, Medicare covers annually or at 6-month intervals for prediabetic patients) Fasting blood sugar (FBS) or glucose tolerance test (GTT) Patient must be diagnosed with one of the following: 
-Hypertension, Dyslipidemia, obesity, previous impaired FBS or GTT 
Or any two of the following: overweight, FH of diabetes, age ? 72, history of gestational diabetes, birth of baby weighing more than 9 pounds Diabetes Self-Management Training (DSMT) (no USPSTF recommendation) Requires referral by treating physician for patient with diabetes or renal disease. 10 hours of initial DSMT session of no less than 30 minutes each in a continuous 12-month period. 2 hours of follow-up DSMT in subsequent years. Glaucoma Screening (no USPSTF recommendation) Diabetes mellitus, family history, , age 48 or over,  American, age 72 or over Human Immunodeficiency Virus (HIV) Screening (annually for increased risk patients) HIV-1 and HIV-2 by EIA, AKILA, rapid antibody test, or oral mucosa transudate Patient must be at increased risk for HIV infection per USPSTF guidelines or pregnant. Tests covered annually for patients at increased risk. Pregnant patients may receive up to 3 test during pregnancy. Medical Nutrition Therapy (MNT) (for diabetes or renal disease not recommended schedule) Requires referral by treating physician for patient with diabetes or renal disease.   Can be provided in same year as diabetes self-management training (DSMT), and CMS recommends medical nutrition therapy take place after DSMT. Up to 3 hours for initial year and 2 hours in subsequent years. Shingles Vaccination A shingles vaccine is also recommended once in a lifetime after age 61 Seasonal Influenza Vaccination (annually) Pneumococcal Vaccination (once after 65) Hepatitis B Vaccinations (if medium/high risk) Medium/high risk factors:  End-stage renal disease, Hemophiliacs who received Factor VIII or IX concentrates, Clients of institutions for the mentally retarded, Persons who live in the same house as a HepB virus carrier, Homosexual men, Illicit injectable drug abusers. Screening Mammography (biennial age 54-69) Annually (age 36 or over) Screening Pap Tests and Pelvic Examination (up to age 79 and after 79 if unknown history or abnormal study last 10 years) Every 24 months except high risk Ultrasound Screening for Abdominal Aortic Aneurysm (AAA) (once) Patient must be referred through [facility] and not have had a screening for abdominal aortic aneurysm before under Medicare. Limited to patients who meet one of the following criteria: 
- Men who are 73-68 years old and have smoked more than 100 cigarettes in their lifetime. 
-Anyone with a FH of AAA 
-Anyone recommended for screening by USPSTF Introducing [facility]! Carmel Bai introduces [address] patient portal. Now you can access parts of your medical record, email your doctor's office, and request medication refills online. 1. In your internet browser, go to [email or web address] 2. Click on the First Time User? Click Here link in the Sign In box. You will see the New Member Sign Up page. 3. Enter your [address] Access Code exactly as it appears below. You will not need to use this code after youve completed the sign-up process. If you do not sign up before the expiration date, you must request a new code. · [address] Access Code: [email or web address] Expires: 9/12/2017 11:12 AM 
 
 4. Enter the last four digits of your Social Security Number (xxxx) and Date of Birth (mm/dd/yyyy) as indicated and click Submit. You will be taken to the next sign-up page. 5. Create a [employer] ID. This will be your [employer] login ID and cannot be changed, so think of one that is secure and easy to remember. 6. Create a [employer] password. You can change your password at any time. 7. Enter your Password Reset Question and Answer. This can be used at a later time if you forget your password. 8. Enter your e-mail address. You will receive e-mail notification when new information is available in [address] 9. Click Sign Up. You can now view and download portions of your medical record. 10. Click the Download Summary menu link to download a portable copy of your medical information. If you have questions, please visit the Frequently Asked Questions section of the [employer] website. Remember, [employer] is NOT to be used for urgent needs. For medical emergencies, dial 911. Now available from your iPhone and Android! Please provide this summary of care documentation to your next provider. Your primary care clinician is listed as Kyung [address]. If you have any questions after today's visit, please call [phone].

## 2017-06-14 NOTE — PROGRESS NOTES
Tsering Severino is a 63 y.o. female here today for [address]         1. Have you been to the ER, urgent care clinic since your last visit? Hospitalized since your last visit? Yes Where: [facility]    2. Have you seen or consulted any other health care providers outside of the [address] [place] [address] since your last visit? Include any pap smears or colon screening.  Yes Where: Optometry

## 2017-06-14 NOTE — TELEPHONE ENCOUNTER
Pt's daughter called in regards to the pt, she was told to get the information to her prior podiatrist information. But they found out that her previous doctor was on longer in the office she used to go to and that they don't know where he went. They are requesting a new referral to a podiatrist doctor that accepts her ins and is close to her if possible.

## 2017-06-14 NOTE — PATIENT INSTRUCTIONS
Medicare Part B Preventive Services Limitations Recommendation Scheduled   Bone Mass Measurement  (age 72 & older, biennial) Requires diagnosis related to osteoporosis or estrogen deficiency. Biennial benefit unless patient has history of long-term glucocorticoid tx or baseline is needed because initial test was by other method     Cardiovascular Screening Blood Tests (every 5 years)  Total cholesterol, HDL, Triglycerides Order as a panel if possible     Colorectal Cancer Screening  -Fecal occult blood test (annual)  -Flexible sigmoidoscopy (5y)  -Screening colonoscopy (10y)  -Barium Enema      Counseling to Prevent Tobacco Use (up to 8 sessions per year)  - Counseling greater than 3 and up to 10 minutes  - Counseling greater than 10 minutes Patients must be asymptomatic of tobacco-related conditions to receive as preventive service     Diabetes Screening Tests (at least every 3 years, Medicare covers annually or at 6-month intervals for prediabetic patients)    Fasting blood sugar (FBS) or glucose tolerance test (GTT) Patient must be diagnosed with one of the following:  -Hypertension, Dyslipidemia, obesity, previous impaired FBS or GTT  Or any two of the following: overweight, FH of diabetes, age ? 72, history of gestational diabetes, birth of baby weighing more than 9 pounds     Diabetes Self-Management Training (DSMT) (no USPSTF recommendation) Requires referral by treating physician for patient with diabetes or renal disease. 10 hours of initial DSMT session of no less than 30 minutes each in a continuous 12-month period. 2 hours of follow-up DSMT in subsequent years.      Glaucoma Screening (no USPSTF recommendation) Diabetes mellitus, family history, , age 48 or over,  American, age 72 or over     Human Immunodeficiency Virus (HIV) Screening (annually for increased risk patients)  HIV-1 and HIV-2 by EIA, AKILA, rapid antibody test, or oral mucosa transudate Patient must be at increased risk for HIV infection per USPSTF guidelines or pregnant. Tests covered annually for patients at increased risk. Pregnant patients may receive up to 3 test during pregnancy. Medical Nutrition Therapy (MNT) (for diabetes or renal disease not recommended schedule) Requires referral by treating physician for patient with diabetes or renal disease. Can be provided in same year as diabetes self-management training (DSMT), and CMS recommends medical nutrition therapy take place after DSMT. Up to 3 hours for initial year and 2 hours in subsequent years. Shingles Vaccination A shingles vaccine is also recommended once in a lifetime after age 61     Seasonal Influenza Vaccination (annually)      Pneumococcal Vaccination (once after 72)      Hepatitis B Vaccinations (if medium/high risk) Medium/high risk factors:  End-stage renal disease,  Hemophiliacs who received Factor VIII or IX concentrates, Clients of institutions for the mentally retarded, Persons who live in the same house as a HepB virus carrier, Homosexual men, Illicit injectable drug abusers. Screening Mammography (biennial age 54-69) Annually (age 36 or over)     Screening Pap Tests and Pelvic Examination (up to age 79 and after 79 if unknown history or abnormal study last 10 years) Every 25 months except high risk     Ultrasound Screening for Abdominal Aortic Aneurysm (AAA) (once) Patient must be referred through [facility] and not have had a screening for abdominal aortic aneurysm before under Medicare.   Limited to patients who meet one of the following criteria:  - Men who are 73-68 years old and have smoked more than 100 cigarettes in their lifetime.  -Anyone with a FH of AAA  -Anyone recommended for screening by USPSTF

## 2017-06-15 ENCOUNTER — HOSPITAL ENCOUNTER (OUTPATIENT)
Dept: LAB | Age: 62
Discharge: HOME | End: 2017-06-15
Payer: MEDICARE

## 2017-06-15 DIAGNOSIS — E11.9 DIABETES MELLITUS WITHOUT COMPLICATION (HCC): ICD-10-CM

## 2017-06-15 LAB
ALBUMIN SERPL BCP-MCNC: 3.9 G/DL (ref 3.4–5)
ALBUMIN/GLOB SERPL: 1.2 {RATIO} (ref 0.8–1.7)
ALP SERPL-CCNC: 75 U/L (ref 45–117)
ALT SERPL-CCNC: 64 U/L (ref 13–56)
ANION GAP BLD CALC-SCNC: 13 MMOL/L (ref 3–18)
APPEARANCE UR: ABNORMAL
AST SERPL W P-5'-P-CCNC: 55 U/L (ref 15–37)
BACTERIA URNS QL MICRO: ABNORMAL /HPF
BASOPHILS # BLD AUTO: 0.1 K/UL (ref 0–0.06)
BASOPHILS # BLD: 1 % (ref 0–2)
BILIRUB DIRECT SERPL-MCNC: 0.1 MG/DL (ref 0–0.2)
BILIRUB SERPL-MCNC: 0.2 MG/DL (ref 0.2–1)
BILIRUB UR QL: NEGATIVE
BUN SERPL-MCNC: 51 MG/DL (ref 7–18)
BUN/CREAT SERPL: 8 (ref 12–20)
CALCIUM SERPL-MCNC: 8.9 MG/DL (ref 8.5–10.1)
CHLORIDE SERPL-SCNC: 100 MMOL/L (ref 100–108)
CHOLEST SERPL-MCNC: 129 MG/DL
CO2 SERPL-SCNC: 24 MMOL/L (ref 21–32)
COLOR UR: YELLOW
CREAT SERPL-MCNC: 6.36 MG/DL (ref 0.6–1.3)
CREAT UR-MCNC: 245.07 MG/DL (ref 30–125)
DIFFERENTIAL METHOD BLD: ABNORMAL
EOSINOPHIL # BLD: 0.2 K/UL (ref 0–0.4)
EOSINOPHIL NFR BLD: 2 % (ref 0–5)
EPITH CASTS URNS QL MICRO: ABNORMAL /LPF (ref 0–5)
ERYTHROCYTE [DISTWIDTH] IN BLOOD BY AUTOMATED COUNT: 14.4 % (ref 11.6–14.5)
GLOBULIN SER CALC-MCNC: 3.3 G/DL (ref 2–4)
GLUCOSE SERPL-MCNC: 97 MG/DL (ref 74–99)
GLUCOSE UR STRIP.AUTO-MCNC: NEGATIVE MG/DL
HBA1C MFR BLD: 7.8 % (ref 4.2–5.6)
HCT VFR BLD AUTO: 36.2 % (ref 35–45)
HDLC SERPL-MCNC: 43 MG/DL (ref 40–60)
HDLC SERPL: 3 {RATIO} (ref 0–5)
HGB BLD-MCNC: 11.3 G/DL (ref 12–16)
HGB UR QL STRIP: NEGATIVE
KETONES UR QL STRIP.AUTO: ABNORMAL MG/DL
LDLC SERPL CALC-MCNC: 39.4 MG/DL (ref 0–100)
LEUKOCYTE ESTERASE UR QL STRIP.AUTO: ABNORMAL
LIPID PROFILE,FLP: ABNORMAL
LYMPHOCYTES # BLD AUTO: 24 % (ref 21–52)
LYMPHOCYTES # BLD: 3 K/UL (ref 0.9–3.6)
MCH RBC QN AUTO: 28.8 PG (ref 24–34)
MCHC RBC AUTO-ENTMCNC: 31.2 G/DL (ref 31–37)
MCV RBC AUTO: 92.1 FL (ref 74–97)
MICROALBUMIN UR-MCNC: 32.2 MG/DL (ref 0–3)
MICROALBUMIN/CREAT UR-RTO: 131 MG/G (ref 0–30)
MONOCYTES # BLD: 0.9 K/UL (ref 0.05–1.2)
MONOCYTES NFR BLD AUTO: 8 % (ref 3–10)
NEUTS SEG # BLD: 8.1 K/UL (ref 1.8–8)
NEUTS SEG NFR BLD AUTO: 65 % (ref 40–73)
NITRITE UR QL STRIP.AUTO: NEGATIVE
PH UR STRIP: 5.5 [PH] (ref 5–8)
PLATELET # BLD AUTO: 372 K/UL (ref 135–420)
PMV BLD AUTO: 9.5 FL (ref 9.2–11.8)
POTASSIUM SERPL-SCNC: 4.3 MMOL/L (ref 3.5–5.5)
PROT SERPL-MCNC: 7.2 G/DL (ref 6.4–8.2)
PROT UR STRIP-MCNC: 100 MG/DL
RBC # BLD AUTO: 3.93 M/UL (ref 4.2–5.3)
RBC #/AREA URNS HPF: 0 /HPF (ref 0–5)
SODIUM SERPL-SCNC: 137 MMOL/L (ref 136–145)
SP GR UR REFRACTOMETRY: 1.02 (ref 1–1.03)
T4 FREE SERPL-MCNC: 1 NG/DL (ref 0.7–1.5)
TRIGL SERPL-MCNC: 233 MG/DL (ref ?–150)
TSH SERPL DL<=0.05 MIU/L-ACNC: 1.19 UIU/ML (ref 0.36–3.74)
UROBILINOGEN UR QL STRIP.AUTO: 1 EU/DL (ref 0.2–1)
VLDLC SERPL CALC-MCNC: 46.6 MG/DL
WBC # BLD AUTO: 12.2 K/UL (ref 4.6–13.2)
WBC URNS QL MICRO: ABNORMAL /HPF (ref 0–4)

## 2017-06-15 PROCEDURE — 80048 BASIC METABOLIC PNL TOTAL CA: CPT | Performed by: INTERNAL MEDICINE

## 2017-06-15 PROCEDURE — 80076 HEPATIC FUNCTION PANEL: CPT | Performed by: INTERNAL MEDICINE

## 2017-06-15 PROCEDURE — 84443 ASSAY THYROID STIM HORMONE: CPT | Performed by: INTERNAL MEDICINE

## 2017-06-15 PROCEDURE — 81001 URINALYSIS AUTO W/SCOPE: CPT | Performed by: INTERNAL MEDICINE

## 2017-06-15 PROCEDURE — 83036 HEMOGLOBIN GLYCOSYLATED A1C: CPT | Performed by: INTERNAL MEDICINE

## 2017-06-15 PROCEDURE — 80061 LIPID PANEL: CPT | Performed by: INTERNAL MEDICINE

## 2017-06-15 PROCEDURE — 82043 UR ALBUMIN QUANTITATIVE: CPT | Performed by: INTERNAL MEDICINE

## 2017-06-15 PROCEDURE — 85025 COMPLETE CBC W/AUTO DIFF WBC: CPT | Performed by: INTERNAL MEDICINE

## 2017-06-15 PROCEDURE — 36415 COLL VENOUS BLD VENIPUNCTURE: CPT | Performed by: INTERNAL MEDICINE

## 2017-06-15 PROCEDURE — 84439 ASSAY OF FREE THYROXINE: CPT | Performed by: INTERNAL MEDICINE

## 2017-06-16 RX ORDER — INSULIN PUMP SYRINGE, 3 ML
EACH MISCELLANEOUS
Qty: 1 KIT | Refills: 0 | Status: SHIPPED | OUTPATIENT
Start: 2017-06-16

## 2017-06-16 RX ORDER — INSULIN PUMP SYRINGE, 3 ML
EACH MISCELLANEOUS
Qty: 1 KIT | Refills: 0 | Status: CANCELLED | OUTPATIENT
Start: 2017-06-16

## 2017-06-16 NOTE — TELEPHONE ENCOUNTER
Pt is requesting a new rx for a Contour meter and strips. Pt lost other meter and has not been able to test her blood in 4 days.

## 2017-06-19 NOTE — TELEPHONE ENCOUNTER
Call placed to patient, insurance not covering meter. Advised patient to call insurance and inquire what meter they will cover.

## 2017-06-22 ENCOUNTER — HOSPITAL ENCOUNTER (OUTPATIENT)
Dept: LAB | Age: 62
Discharge: HOME | End: 2017-06-22
Payer: MEDICARE

## 2017-06-22 ENCOUNTER — OFFICE VISIT (OUTPATIENT)
Dept: FAMILY MEDICINE CLINIC | Age: 62
End: 2017-06-22

## 2017-06-22 VITALS
BODY MASS INDEX: 36.79 KG/M2 | WEIGHT: 207.6 LBS | HEIGHT: 63 IN | TEMPERATURE: 98.7 F | DIASTOLIC BLOOD PRESSURE: 86 MMHG | OXYGEN SATURATION: 96 % | SYSTOLIC BLOOD PRESSURE: 142 MMHG | HEART RATE: 100 BPM | RESPIRATION RATE: 16 BRPM

## 2017-06-22 DIAGNOSIS — R79.89 ELEVATED SERUM CREATININE: ICD-10-CM

## 2017-06-22 DIAGNOSIS — E78.00 PURE HYPERCHOLESTEROLEMIA: ICD-10-CM

## 2017-06-22 DIAGNOSIS — I10 ESSENTIAL HYPERTENSION: ICD-10-CM

## 2017-06-22 DIAGNOSIS — N30.00 ACUTE CYSTITIS WITHOUT HEMATURIA: ICD-10-CM

## 2017-06-22 DIAGNOSIS — E11.319 DIABETIC RETINOPATHY ASSOCIATED WITH TYPE 2 DIABETES MELLITUS, MACULAR EDEMA PRESENCE UNSPECIFIED, UNSPECIFIED LATERALITY, UNSPECIFIED RETINOPATHY SEVERITY (HCC): Primary | ICD-10-CM

## 2017-06-22 DIAGNOSIS — E11.9 DIABETES MELLITUS WITHOUT COMPLICATION (HCC): ICD-10-CM

## 2017-06-22 LAB
ANION GAP BLD CALC-SCNC: 10 MMOL/L (ref 3–18)
APPEARANCE UR: CLEAR
BILIRUB UR QL: NEGATIVE
BUN SERPL-MCNC: 15 MG/DL (ref 7–18)
BUN/CREAT SERPL: 14 (ref 12–20)
CALCIUM SERPL-MCNC: 8.8 MG/DL (ref 8.5–10.1)
CHLORIDE SERPL-SCNC: 104 MMOL/L (ref 100–108)
CO2 SERPL-SCNC: 26 MMOL/L (ref 21–32)
COLOR UR: YELLOW
CREAT SERPL-MCNC: 1.09 MG/DL (ref 0.6–1.3)
GLUCOSE SERPL-MCNC: 235 MG/DL (ref 74–99)
GLUCOSE UR STRIP.AUTO-MCNC: >1000 MG/DL
HGB UR QL STRIP: NEGATIVE
KETONES UR QL STRIP.AUTO: NEGATIVE MG/DL
LEUKOCYTE ESTERASE UR QL STRIP.AUTO: NEGATIVE
NITRITE UR QL STRIP.AUTO: NEGATIVE
PH UR STRIP: 5.5 [PH] (ref 5–8)
POTASSIUM SERPL-SCNC: 3.8 MMOL/L (ref 3.5–5.5)
PROT UR STRIP-MCNC: NEGATIVE MG/DL
SODIUM SERPL-SCNC: 140 MMOL/L (ref 136–145)
SP GR UR REFRACTOMETRY: 1.03 (ref 1–1.03)
UROBILINOGEN UR QL STRIP.AUTO: 0.2 EU/DL (ref 0.2–1)

## 2017-06-22 PROCEDURE — 81003 URINALYSIS AUTO W/O SCOPE: CPT | Performed by: INTERNAL MEDICINE

## 2017-06-22 PROCEDURE — 80048 BASIC METABOLIC PNL TOTAL CA: CPT | Performed by: INTERNAL MEDICINE

## 2017-06-22 PROCEDURE — 36415 COLL VENOUS BLD VENIPUNCTURE: CPT | Performed by: INTERNAL MEDICINE

## 2017-06-22 PROCEDURE — 87086 URINE CULTURE/COLONY COUNT: CPT | Performed by: INTERNAL MEDICINE

## 2017-06-22 RX ORDER — MELOXICAM 15 MG/1
TABLET ORAL
Qty: 90 TAB | Refills: 1 | Status: SHIPPED | OUTPATIENT
Start: 2017-06-22 | End: 2018-03-05 | Stop reason: SDUPTHER

## 2017-06-22 RX ORDER — SIMVASTATIN 10 MG/1
10 TABLET, FILM COATED ORAL
Qty: 90 TAB | Refills: 1 | Status: SHIPPED | OUTPATIENT
Start: 2017-06-22 | End: 2017-12-04 | Stop reason: SDUPTHER

## 2017-06-22 RX ORDER — QUETIAPINE FUMARATE 50 MG/1
TABLET, FILM COATED ORAL
Qty: 90 TAB | Refills: 1 | Status: SHIPPED | OUTPATIENT
Start: 2017-06-22 | End: 2018-04-05 | Stop reason: SDUPTHER

## 2017-06-22 RX ORDER — INSULIN PUMP SYRINGE, 3 ML
EACH MISCELLANEOUS
Qty: 1 KIT | Refills: 0 | Status: CANCELLED | OUTPATIENT
Start: 2017-06-22

## 2017-06-22 RX ORDER — AMLODIPINE BESYLATE 5 MG/1
5 TABLET ORAL DAILY
COMMUNITY
End: 2017-06-22 | Stop reason: SDUPTHER

## 2017-06-22 RX ORDER — GLIPIZIDE 10 MG/1
TABLET, FILM COATED, EXTENDED RELEASE ORAL
Qty: 180 TAB | Refills: 1 | Status: SHIPPED | OUTPATIENT
Start: 2017-06-22 | End: 2018-03-05 | Stop reason: SDUPTHER

## 2017-06-22 RX ORDER — INSULIN GLARGINE 100 [IU]/ML
38 INJECTION, SOLUTION SUBCUTANEOUS DAILY
Qty: 30 ML | Refills: 3 | Status: SHIPPED | OUTPATIENT
Start: 2017-06-22 | End: 2018-03-05 | Stop reason: SDUPTHER

## 2017-06-22 RX ORDER — PEN NEEDLE, DIABETIC 31 GX3/16"
NEEDLE, DISPOSABLE MISCELLANEOUS
Qty: 100 PEN NEEDLE | Refills: 1 | Status: SHIPPED | OUTPATIENT
Start: 2017-06-22 | End: 2018-08-17 | Stop reason: SDUPTHER

## 2017-06-22 RX ORDER — PAROXETINE HYDROCHLORIDE 40 MG/1
40 TABLET, FILM COATED ORAL DAILY
Qty: 90 TAB | Refills: 1 | Status: SHIPPED | OUTPATIENT
Start: 2017-06-22 | End: 2018-04-05 | Stop reason: SDUPTHER

## 2017-06-22 RX ORDER — CEPHALEXIN 500 MG/1
500 CAPSULE ORAL 4 TIMES DAILY
COMMUNITY
End: 2017-06-22 | Stop reason: ALTCHOICE

## 2017-06-22 RX ORDER — PANTOPRAZOLE SODIUM 40 MG/1
40 TABLET, DELAYED RELEASE ORAL DAILY
Qty: 90 TAB | Refills: 1 | Status: SHIPPED | OUTPATIENT
Start: 2017-06-22 | End: 2018-03-05 | Stop reason: SDUPTHER

## 2017-06-22 RX ORDER — AMLODIPINE BESYLATE 5 MG/1
5 TABLET ORAL DAILY
Qty: 90 TAB | Refills: 1 | Status: SHIPPED | OUTPATIENT
Start: 2017-06-22 | End: 2017-07-14 | Stop reason: SDUPTHER

## 2017-06-22 NOTE — MR AVS SNAPSHOT
Visit Information Date & Time Provider Department Dept. Phone Encounter #  
 6/22/2017  2:00 PM Becca Silver, 3 [facility] [phone] [id number] Your Appointments 10/16/2017  1:00 PM  
Follow Up with Becca Silver MD  
3 [facility]) Appt Note: 4 month f/u  
 [address] [facility] [address] [facility] [address] [phone]  
  
   
 [address] Upcoming Health Maintenance Date Due Hepatitis C Screening 1955 FOBT Q 1 YEAR AGE 50-75 2/13/2007 BREAST CANCER SCRN MAMMOGRAM 2/5/2015 EYE EXAM RETINAL OR DILATED Q1 11/18/2016 PAP AKA CERVICAL CYTOLOGY 3/18/2017 INFLUENZA AGE 9 TO ADULT 8/1/2017 HEMOGLOBIN A1C Q6M 12/15/2017 FOOT EXAM Q1 1/13/2018 MICROALBUMIN Q1 6/15/2018 LIPID PANEL Q1 6/15/2018 DTaP/Tdap/Td series (2 - Td) 1/13/2027 Allergies as of 6/22/2017  Review Complete On: 6/22/2017 By: Marlene Caldera LPN Severity Noted Reaction Type Reactions Amaryl [Glimepiride]  03/22/2012    Nausea Only Penicillins  10/25/2010    Nausea and Vomiting Current Immunizations  Reviewed on 6/17/2016 Name Date Pneumococcal Polysaccharide (PPSV-23) 1/13/2017  9:02 AM  
  
 Not reviewed this visit You Were Diagnosed With   
  
 Codes Comments Diabetic retinopathy associated with type 2 diabetes mellitus, macular edema presence unspecified, unspecified laterality, unspecified retinopathy severity    -  Primary ICD-10-CM: E11.319 ICD-9-CM: 250.50, 362.01 Diabetes mellitus without complication ([facility] [address]     IHF-78-JS: E11.9 ICD-9-CM: 250.00 Pure hypercholesterolemia     ICD-10-CM: E78.00 ICD-9-CM: 272.0 Essential hypertension     ICD-10-CM: I10 
ICD-9-CM: 401.9 Elevated serum creatinine     ICD-10-CM: R79.89 ICD-9-CM: 790.99 Acute cystitis without hematuria     ICD-10-CM: N30.00 ICD-9-CM: 595.0 Vitals BP Pulse Temp Resp Height(growth percentile) Weight(growth percentile) 142/86 (BP 1 Location: Left arm, BP Patient Position: Sitting) 100 98.7 °F (37.1 °C) (Oral) 16 5' 3\" (1.6 m) 207 lb 9.6 oz (94.2 kg) SpO2 BMI OB Status Smoking Status 96% 36.77 kg/m2 Postmenopausal Current Every Day Smoker Vitals History BMI and BSA Data Body Mass Index Body Surface Area  
 36.77 kg/m 2 2.05 m 2 Preferred Pharmacy Pharmacy Name Phone [facility] [phone] [facility] [address], [address] [phone] Your Updated Medication List  
  
   
This list is accurate as of: 6/22/17  2:39 PM.  Always use your most recent med list.  
  
  
  
  
 alcohol swabs Padm Commonly known as:  ALCOHOL PADS Dispense 1 box 200 count. Dx 250.00. Twice daily use  
  
 amLODIPine 5 mg tablet Commonly known as:  Bashir Harvey Take 1 Tab by mouth daily. bimatoprost 0.01 % ophthalmic drops Commonly known as:  LUMIGAN Administer 1 Drop to both eyes every evening. Blood-Glucose Meter monitoring kit Commonly known as:  CONTOUR METER  
DX E11.9 Ferrous Sulfate 47.5 mg iron Tber tablet Commonly known as:  SLOW FE Take 1 Tab by mouth daily. glipiZIDE SR 10 mg CR tablet Commonly known as:  GLUCOTROL XL  
TAKE 1 TABLET BY MOUTH TWICE DAILY  
  
 insulin glargine 100 unit/mL (3 mL) Inpn Commonly known as:  LANTUS SOLOSTAR  
38 Units by SubCUTAneous route daily. Insulin Needles (Disposable) 32 gauge x 5/32\" Ndle Commonly known as:  Rosalva Pen Needle USE AS DIRECTED ONCE EVERY DAY  
  
 meloxicam 15 mg tablet Commonly known as:  MOBIC  
TAKE 1 TABLET BY MOUTH DAILY pantoprazole 40 mg tablet Commonly known as:  PROTONIX Take 1 Tab by mouth daily. PARoxetine 40 mg tablet Commonly known as:  PAXIL Take 1 Tab by mouth daily. QUEtiapine 50 mg tablet Commonly known as:  SEROquel TAKE 1 TABLET BY MOUTH EVERY EVENING AS NEEDED  
  
 simvastatin 10 mg tablet Commonly known as:  ZOCOR Take 1 Tab by mouth nightly. VITAMIN D3 1,000 unit tablet Generic drug:  cholecalciferol Take  by mouth daily. Prescriptions Sent to Pharmacy Refills  
 amLODIPine (NORVASC) 5 mg tablet 1 Sig: Take 1 Tab by mouth daily. Class: Normal  
 Pharmacy: [address], [address] [place] [address] Ph #: [phone] Route: Oral  
 glipiZIDE SR (GLUCOTROL XL) 10 mg CR tablet 1 Sig: TAKE 1 TABLET BY MOUTH TWICE DAILY Class: Normal  
 Pharmacy: [facility] Ph #: [phone]  
 meloxicam (MOBIC) 15 mg tablet 1 Sig: TAKE 1 TABLET BY MOUTH DAILY Class: Normal  
 Pharmacy: [facility] Ph #: [phone]  
 pantoprazole (PROTONIX) 40 mg tablet 1 Sig: Take 1 Tab by mouth daily. Class: Normal  
 Pharmacy: [address], [address] [place] [address] Ph #: [phone] Route: Oral  
 Ferrous Sulfate (SLOW FE) 47.5 mg iron TbER tablet 1 Sig: Take 1 Tab by mouth daily. Class: Normal  
 Pharmacy: [address], [address] [place] [address] Ph #: [phone] Route: Oral  
 simvastatin (ZOCOR) 10 mg tablet 1 Sig: Take 1 Tab by mouth nightly. Class: Normal  
 Pharmacy: [address], [address] [place] [address] Ph #: [phone] Route: Oral  
 QUEtiapine (SEROQUEL) 50 mg tablet 1 Sig: TAKE 1 TABLET BY MOUTH EVERY EVENING AS NEEDED  Class: Normal  
 Pharmacy: [facility] [address] [place] [address] Rd, [address] Ph #: [phone] insulin glargine (LANTUS SOLOSTAR) 100 unit/mL (3 mL) inpn 3 Si Units by SubCUTAneous route daily. Class: Normal  
 Pharmacy: [facility] [address] [place] [address] Rd, [facility] [address] Ph #: [phone] Route: SubCUTAneous PARoxetine (PAXIL) 40 mg tablet 1 Sig: Take 1 Tab by mouth daily. Class: Normal  
 Pharmacy: [facility] [address] [place] [address] Rd, [facility] [address] Ph #: [phone] Route: Oral  
 Insulin Needles, Disposable, (KENDALL PEN NEEDLE) 32 gauge x 5/32\" ndle 1 Sig: USE AS DIRECTED ONCE EVERY DAY Class: Normal  
 Pharmacy: [address], [address] [place] [address] Ph #: [phone] To-Do List   
 2017 Microbiology:  CULTURE, URINE   
  
 2017 Lab:  METABOLIC PANEL, BASIC   
  
 2017 Lab:  URINALYSIS W/ RFLX MICROSCOPIC Patient Instructions Learning About Diabetes Food Guidelines Your Care Instructions Meal planning is important to manage diabetes. It helps keep your blood sugar at a target level (which you set with your doctor). You don't have to eat special foods. You can eat what your family eats, including sweets once in a while. But you do have to pay attention to how often you eat and how much you eat of certain foods. You may want to work with a dietitian or a certified diabetes educator (CDE) to help you plan meals and snacks. A dietitian or CDE can also help you lose weight if that is one of your goals. What should you know about eating carbs? Managing the amount of carbohydrate (carbs) you eat is an important part of healthy meals when you have diabetes. Carbohydrate is found in many foods. · Learn which foods have carbs. And learn the amounts of carbs in different foods. ¨ Bread, cereal, pasta, and rice have about 15 grams of carbs in a serving. A serving is 1 slice of bread (1 ounce), ½ cup of cooked cereal, or 1/3 cup of cooked pasta or rice. ¨ Fruits have 15 grams of carbs in a serving. A serving is 1 small fresh fruit, such as an apple or orange; ½ of a banana; ½ cup of cooked or canned fruit; ½ cup of fruit juice; 1 cup of melon or raspberries; or 2 tablespoons of dried fruit. ¨ Milk and no-sugar-added yogurt have 15 grams of carbs in a serving. A serving is 1 cup of milk or 2/3 cup of no-sugar-added yogurt. ¨ Starchy vegetables have 15 grams of carbs in a serving. A serving is ½ cup of mashed potatoes or sweet potato; 1 cup winter squash; ½ of a small baked potato; ½ cup of cooked beans; or ½ cup cooked corn or green peas. · Learn how much carbs to eat each day and at each meal. A dietitian or CDE can teach you how to keep track of the amount of carbs you eat. This is called carbohydrate counting. · If you are not sure how to count carbohydrate grams, use the Plate Method to plan meals. It is a good, quick way to make sure that you have a balanced meal. It also helps you spread carbs throughout the day. ¨ Divide your plate by types of foods. Put non-starchy vegetables on half the plate, meat or other protein food on one-quarter of the plate, and a grain or starchy vegetable in the final quarter of the plate. To this you can add a small piece of fruit and 1 cup of milk or yogurt, depending on how many carbs you are supposed to eat at a meal. 
· Try to eat about the same amount of carbs at each meal. Do not \"save up\" your daily allowance of carbs to eat at one meal. 
· Proteins have very little or no carbs per serving. Examples of proteins are beef, chicken, turkey, fish, eggs, tofu, cheese, cottage cheese, and peanut butter.  A serving size of meat is 3 ounces, which is about the size of a deck of cards. Examples of meat substitute serving sizes (equal to 1 ounce of meat) are 1/4 cup of cottage cheese, 1 egg, 1 tablespoon of peanut butter, and ½ cup of tofu. How can you eat out and still eat healthy? · Learn to estimate the serving sizes of foods that have carbohydrate. If you measure food at home, it will be easier to estimate the amount in a serving of restaurant food. · If the meal you order has too much carbohydrate (such as potatoes, corn, or baked beans), ask to have a low-carbohydrate food instead. Ask for a salad or green vegetables. · If you use insulin, check your blood sugar before and after eating out to help you plan how much to eat in the future. · If you eat more carbohydrate at a meal than you had planned, take a walk or do other exercise. This will help lower your blood sugar. What else should you know? · Limit saturated fat, such as the fat from meat and dairy products. This is a healthy choice because people who have diabetes are at higher risk of heart disease. So choose lean cuts of meat and nonfat or low-fat dairy products. Use olive or canola oil instead of butter or shortening when cooking. · Don't skip meals. Your blood sugar may drop too low if you skip meals and take insulin or certain medicines for diabetes. · Check with your doctor before you drink alcohol. Alcohol can cause your blood sugar to drop too low. Alcohol can also cause a bad reaction if you take certain diabetes medicines. Follow-up care is a key part of your treatment and safety. Be sure to make and go to all appointments, and call your doctor if you are having problems. It's also a good idea to know your test results and keep a list of the medicines you take. Where can you learn more? Go to http://daina-penelope.info/. Enter I560 in the search box to learn more about \"Learning About Diabetes Food Guidelines. \" Current as of: March 13, 2017 Content Version: 11.3 © 3671-8188 Healthwise, Incorporated. Care instructions adapted under license by [employer] (which disclaims liability or warranty for this information). If you have questions about a medical condition or this instruction, always ask your healthcare professional. [address] any warranty or liability for your use of this information. Introducing [facility]! [place] introduces [employer] patient portal. Now you can access parts of your medical record, email your doctor's office, and request medication refills online. 1. In your internet browser, go to [email or web address] 2. Click on the First Time User? Click Here link in the Sign In box. You will see the New Member Sign Up page. 3. Enter your [employer] Access Code exactly as it appears below. You will not need to use this code after youve completed the sign-up process. If you do not sign up before the expiration date, you must request a new code. · [employer] Access Code: [email or web address] Expires: 9/12/2017 11:12 AM 
 
4. Enter the last four digits of your Social Security Number (xxxx) and Date of Birth (mm/dd/yyyy) as indicated and click Submit. You will be taken to the next sign-up page. 5. Create a [employer] ID. This will be your [employer] login ID and cannot be changed, so think of one that is secure and easy to remember. 6. Create a [employer] password. You can change your password at any time. 7. Enter your Password Reset Question and Answer. This can be used at a later time if you forget your password. 8. Enter your e-mail address. You will receive e-mail notification when new information is available in [address] 9. Click Sign Up. You can now view and download portions of your medical record. 10. Click the Download Summary menu link to download a portable copy of your medical information.  
 
If you have questions, please visit the Frequently Asked Questions section of the [employer]. Remember, [employer] is NOT to be used for urgent needs. For medical emergencies, dial 911. Now available from your iPhone and Android! Please provide this summary of care documentation to your next provider. Your primary care clinician is listed as [address]. If you have any questions after today's visit, please call [phone].

## 2017-06-22 NOTE — PATIENT INSTRUCTIONS

## 2017-06-22 NOTE — PROGRESS NOTES
Luis Antonio Angeles is a 63 y.o. female presents to office for ED f/u. Pt was seen at [facility]. 1. Have you been to the ER, urgent care clinic or hospitalized since your last visit? yes  2. Have you seen any other providers outside of [facility] since your last visit? yes  3.  Have you had a Flu shot this year? no      Health Maintenance items with a due date reviewed with patient:  Health Maintenance Due   Topic Date Due    Hepatitis C Screening  1955    FOBT Q 1 YEAR AGE 50-75  02/13/2007    BREAST CANCER SCRN MAMMOGRAM  02/05/2015    EYE EXAM RETINAL OR DILATED Q1  11/18/2016    PAP AKA CERVICAL CYTOLOGY  03/18/2017

## 2017-06-23 NOTE — PROGRESS NOTES
Assessment/Plan:    Lia Calderon was seen today for ed follow-up. Diagnoses and all orders for this visit:    Diabetic retinopathy associated with type 2 diabetes mellitus, macular edema presence unspecified, unspecified laterality, unspecified retinopathy severity    Diabetes mellitus without complication (HCC)  -     glipiZIDE SR (GLUCOTROL XL) 10 mg CR tablet; TAKE 1 TABLET BY MOUTH TWICE DAILY  -     insulin glargine (LANTUS SOLOSTAR) 100 unit/mL (3 mL) inpn; 38 Units by SubCUTAneous route daily. Pure hypercholesterolemia  -     simvastatin (ZOCOR) 10 mg tablet; Take 1 Tab by mouth nightly. Essential hypertension  -     amLODIPine (NORVASC) 5 mg tablet; Take 1 Tab by mouth daily. Elevated serum creatinine  -     METABOLIC PANEL, BASIC; Future    Acute cystitis without hematuria  -     URINALYSIS W/ RFLX MICROSCOPIC; Future  -     CULTURE, URINE; Future    Other orders  -     Cancel: Blood-Glucose Meter (CONTOUR METER) monitoring kit; DX E11.9  -     Cancel: bimatoprost (LUMIGAN) 0.01 % ophthalmic drops; Administer 1 Drop to both eyes every evening.  -     meloxicam (MOBIC) 15 mg tablet; TAKE 1 TABLET BY MOUTH DAILY  -     pantoprazole (PROTONIX) 40 mg tablet; Take 1 Tab by mouth daily.  -     Ferrous Sulfate (SLOW FE) 47.5 mg iron TbER tablet; Take 1 Tab by mouth daily.  -     QUEtiapine (SEROQUEL) 50 mg tablet; TAKE 1 TABLET BY MOUTH EVERY EVENING AS NEEDED  -     PARoxetine (PAXIL) 40 mg tablet; Take 1 Tab by mouth daily.  -     Insulin Needles, Disposable, (KENDALL PEN NEEDLE) 32 gauge x 5/32\" ndle; USE AS DIRECTED ONCE EVERY DAY    pt will f/u in early Sept.  repeat A1c. The plan was discussed with the patient. The patient verbalized understanding and is in agreement with the plan.   All medication potential side effects were discussed with the patient.    -------------------------------------------------------------------------------------------------------------------        Gini Libman is a 63 y.o. female and presents with ED Follow-up         Subjective:  Pt here for f/u after she was found to have an elevated Cre. Was called upon discovery of this and was sent tot he ER. In the ER, they repeated the Cre which had dropped from a 6.36 to 3.4. She was taken off Metformin and Prinzide. Norvasc was added by the ER. She takes 25 units of Lantus but is supposed to be on 38 units. HTN: borderline. ROS:  Constitutional: No recent weight change. No weakness/fatigue. No f/c. Skin: No rashes, change in nails/hair, itching   HENT: No HA, dizziness. No hearing loss/tinnitus. No nasal congestion/discharge. Eyes: No change in vision, double/blurred vision or eye pain/redness. Cardiovascular: No CP/palpitations. No CORADO/orthopnea/PND. Respiratory: No cough/sputum, dyspnea, wheezing. Gastointestinal: No dysphagia, reflux. No n/v. No constipation/diarrhea. No melena/rectal bleeding. Genitourinary: No dysuria, urinary hesitancy, nocturia, hematuria. No incontinence. Musculoskeletal: No joint pain/stiffness. No muscle pain/tenderness. Endo: No heat/cold intolerance, no polyuria/polydypsia. Heme: No h/o anemia. No easy bleeding/bruising. Allergy/Immunology: No seasonal rhinitis. Denies frequent colds, sinus/ear infections. Neurological: No seizures/numbness/weakness. No paresthesias. Psychiatric:  No depression, anxiety. The problem list was updated as a part of today's visit. Patient Active Problem List   Diagnosis Code    HTN (hypertension) I10    Insomnia G47.00    GERD (gastroesophageal reflux disease) K21.9    Schizophrenia ([address] F20.9    DM (diabetes mellitus) ([address] E11.9    Hyperlipidemia E78.5    Diabetic retinopathy ([address] E11.319    Cataract H26.9    Glaucoma H40.9    Sleep apnea, obstructive G47.33    Diabetic neuropathy (HCC) E11.40    Heel spur M77.30    Depression F32.9       The PSH, FH were reviewed.         SH:  Social History   Substance Use Topics    Smoking status: Current Every Day Smoker     Packs/day: 0.50     Years: 21.00     Types: Cigarettes    Smokeless tobacco: Current User    Alcohol use No       Medications/Allergies:  Current Outpatient Prescriptions on File Prior to Visit   Medication Sig Dispense Refill    Blood-Glucose Meter (CONTOUR METER) monitoring kit DX E11.9 1 Kit 0    bimatoprost (LUMIGAN) 0.01 % ophthalmic drops Administer 1 Drop to both eyes every evening.  alcohol swabs (ALCOHOL PADS) padm Dispense 1 box 200 count. Dx 250.00. Twice daily use 200 Pad 2    cholecalciferol, vitamin d3, (VITAMIN D) 1,000 unit tablet Take  by mouth daily. No current facility-administered medications on file prior to visit. Allergies   Allergen Reactions    Amaryl [Glimepiride] Nausea Only    Penicillins Nausea and Vomiting         Health Maintenance:   Health Maintenance   Topic Date Due    Hepatitis C Screening  1955    FOBT Q 1 YEAR AGE 50-75  02/13/2007    BREAST CANCER SCRN MAMMOGRAM  02/05/2015    EYE EXAM RETINAL OR DILATED Q1  11/18/2016    PAP AKA CERVICAL CYTOLOGY  03/18/2017    INFLUENZA AGE 9 TO ADULT  08/01/2017    HEMOGLOBIN A1C Q6M  12/15/2017    FOOT EXAM Q1  01/13/2018    MICROALBUMIN Q1  06/15/2018    LIPID PANEL Q1  06/15/2018    DTaP/Tdap/Td series (2 - Td) 01/13/2027    ZOSTER VACCINE AGE 60>  Addressed    Pneumococcal 19-64 Medium Risk  Completed       Objective:  Visit Vitals    /86 (BP 1 Location: Left arm, BP Patient Position: Sitting)    Pulse 100    Temp 98.7 °F (37.1 °C) (Oral)    Resp 16    Ht 5' 3\" (1.6 m)    Wt 207 lb 9.6 oz (94.2 kg)    SpO2 96%    BMI 36.77 kg/m2          Nurses notes and VS reviewed.       Physical Examination: General appearance - alert, well appearing, and in no distress  Chest - clear to auscultation, no wheezes, rales or rhonchi, symmetric air entry  Heart - normal rate and regular rhythm        Labwork and Ancillary Studies:    CBC w/Diff  Lab Results   Component Value Date/Time    WBC 12.2 06/15/2017 11:08 AM    HGB 11.3 06/15/2017 11:08 AM    PLATELET 933 33/42/7032 11:08 AM         Basic Metabolic Profile  Lab Results   Component Value Date/Time    Sodium 140 06/22/2017 02:46 PM    Potassium 3.8 06/22/2017 02:46 PM    Chloride 104 06/22/2017 02:46 PM    CO2 26 06/22/2017 02:46 PM    Anion gap 10 06/22/2017 02:46 PM    Glucose 235 06/22/2017 02:46 PM    BUN 15 06/22/2017 02:46 PM    Creatinine 1.09 06/22/2017 02:46 PM    BUN/Creatinine ratio 14 06/22/2017 02:46 PM    GFR est AA >60 06/22/2017 02:46 PM    GFR est non-AA 51 06/22/2017 02:46 PM    Calcium 8.8 06/22/2017 02:46 PM         LFT  Lab Results   Component Value Date/Time    ALT (SGPT) 64 06/15/2017 11:08 AM    AST (SGOT) 55 06/15/2017 11:08 AM    Alk.  phosphatase 75 06/15/2017 11:08 AM    Bilirubin, direct 0.1 06/15/2017 11:08 AM    Bilirubin, total 0.2 06/15/2017 11:08 AM         Cholesterol  Lab Results   Component Value Date/Time    Cholesterol, total 129 06/15/2017 11:08 AM    HDL Cholesterol 43 06/15/2017 11:08 AM    LDL, calculated 39.4 06/15/2017 11:08 AM    Triglyceride 233 06/15/2017 11:08 AM    CHOL/HDL Ratio 3.0 06/15/2017 11:08 AM

## 2017-06-23 NOTE — PROGRESS NOTES
Spoke to pt re: recent lab results and MD recommendations. Pt verbalized understanding and had no questions at this time.

## 2017-06-24 LAB
BACTERIA SPEC CULT: NORMAL
SERVICE CMNT-IMP: NORMAL

## 2017-06-26 ENCOUNTER — TELEPHONE (OUTPATIENT)
Dept: FAMILY MEDICINE CLINIC | Age: 62
End: 2017-06-26

## 2017-06-26 NOTE — TELEPHONE ENCOUNTER
She needs to get the RX for the Lumigan eye drops from her eye specialist because they are monitoring and treating her for that condition.

## 2017-06-26 NOTE — TELEPHONE ENCOUNTER
Pt called stating she needs a Glucose meter and eye drops sent in to the [facility] at [facility]..

## 2017-06-29 LAB — HEMOCCULT STL QL IA: NEGATIVE

## 2017-07-14 ENCOUNTER — OFFICE VISIT (OUTPATIENT)
Dept: FAMILY MEDICINE CLINIC | Age: 62
End: 2017-07-14

## 2017-07-14 VITALS
HEIGHT: 63 IN | WEIGHT: 204 LBS | BODY MASS INDEX: 36.14 KG/M2 | TEMPERATURE: 98.2 F | SYSTOLIC BLOOD PRESSURE: 162 MMHG | HEART RATE: 100 BPM | RESPIRATION RATE: 16 BRPM | DIASTOLIC BLOOD PRESSURE: 92 MMHG | OXYGEN SATURATION: 97 %

## 2017-07-14 DIAGNOSIS — I10 ESSENTIAL HYPERTENSION: ICD-10-CM

## 2017-07-14 DIAGNOSIS — H26.9 CATARACT OF BOTH EYES, UNSPECIFIED CATARACT TYPE: Primary | ICD-10-CM

## 2017-07-14 DIAGNOSIS — Z79.4 TYPE 2 DIABETES MELLITUS WITH DIABETIC POLYNEUROPATHY, WITH LONG-TERM CURRENT USE OF INSULIN (HCC): ICD-10-CM

## 2017-07-14 DIAGNOSIS — Z01.818 PREOP EXAMINATION: ICD-10-CM

## 2017-07-14 DIAGNOSIS — E11.42 TYPE 2 DIABETES MELLITUS WITH DIABETIC POLYNEUROPATHY, WITH LONG-TERM CURRENT USE OF INSULIN (HCC): ICD-10-CM

## 2017-07-14 RX ORDER — AMLODIPINE BESYLATE 10 MG/1
10 TABLET ORAL DAILY
Qty: 90 TAB | Refills: 1 | Status: SHIPPED | OUTPATIENT
Start: 2017-07-14 | End: 2017-12-04 | Stop reason: SDUPTHER

## 2017-07-14 RX ORDER — AMLODIPINE BESYLATE 5 MG/1
5 TABLET ORAL DAILY
Qty: 90 TAB | Refills: 1 | Status: SHIPPED | OUTPATIENT
Start: 2017-07-14 | End: 2017-07-14 | Stop reason: SDUPTHER

## 2017-07-14 NOTE — PROGRESS NOTES
Assessment/Plan:    Joby Oliveira was seen today for pre-op exam.    Diagnoses and all orders for this visit:    Cataract of both eyes, unspecified cataract type    Essential hypertension  -     Discontinue: amLODIPine (NORVASC) 5 mg tablet; Take 1 Tab by mouth daily. -     amLODIPine (NORVASC) 10 mg tablet; Take 1 Tab by mouth daily. Type 2 diabetes mellitus with diabetic polyneuropathy, with long-term current use of insulin ([address]    Preop examination        Pt will return in 3 days for f/u to her BP. Will add Norvasc back. She is supposed to be on this already. The plan was discussed with the patient. The patient verbalized understanding and is in agreement with the plan. All medication potential side effects were discussed with the patient. Addendum: 7/18/17:  Pt has been back trupti Norvasc and her BP today was 132/84. Pt can proceed with surgery. -------------------------------------------------------------------------------------------------------------------        Nasrin Michele is a 63 y.o. female and presents with Pre-op Exam         Subjective:  Pt here for preop clearance for cataract extraction with IOL placement for the RT eye on 7/19 and LT eye on 8/1/17. Procedure will be done by Dr. Cinthia Baker, [facility]. HTN: Not well controlled. She has not been on Norvasc, not sure what happened. She is supposed to be taking this. ROS:  Constitutional: No recent weight change. No weakness/fatigue. No f/c. Skin: No rashes, change in nails/hair, itching   HENT: No HA, dizziness. No hearing loss/tinnitus. No nasal congestion/discharge. Eyes: No change in vision, double/blurred vision or eye pain/redness. Cardiovascular: No CP/palpitations. No CORADO/orthopnea/PND. Respiratory: No cough/sputum, dyspnea, wheezing. Gastointestinal: No dysphagia, reflux. No n/v. No constipation/diarrhea. No melena/rectal bleeding.    Genitourinary: No dysuria, urinary hesitancy, nocturia, hematuria. No incontinence. Musculoskeletal: No joint pain/stiffness. No muscle pain/tenderness. Endo: No heat/cold intolerance, no polyuria/polydypsia. Heme: No h/o anemia. No easy bleeding/bruising. Allergy/Immunology: No seasonal rhinitis. Denies frequent colds, sinus/ear infections. Neurological: No seizures/numbness/weakness. No paresthesias. Psychiatric:  No depression, anxiety. The problem list was updated as a part of today's visit. Patient Active Problem List   Diagnosis Code    HTN (hypertension) I10    Insomnia G47.00    GERD (gastroesophageal reflux disease) K21.9    Schizophrenia ([facility] F20.9    DM (diabetes mellitus) ([facility] E11.9    Hyperlipidemia E78.5    Diabetic retinopathy ([facility] E11.319    Cataract H26.9    Glaucoma H40.9    Sleep apnea, obstructive G47.33    Diabetic neuropathy (HCC) E11.40    Heel spur M77.30    Depression F32.9       The PSH, FH were reviewed. SH:  Social History   Substance Use Topics    Smoking status: Current Every Day Smoker     Packs/day: 0.50     Years: 21.00     Types: Cigarettes    Smokeless tobacco: Current User    Alcohol use No       Medications/Allergies:  Current Outpatient Prescriptions on File Prior to Visit   Medication Sig Dispense Refill    glipiZIDE SR (GLUCOTROL XL) 10 mg CR tablet TAKE 1 TABLET BY MOUTH TWICE DAILY 180 Tab 1    meloxicam (MOBIC) 15 mg tablet TAKE 1 TABLET BY MOUTH DAILY 90 Tab 1    pantoprazole (PROTONIX) 40 mg tablet Take 1 Tab by mouth daily. 90 Tab 1    Ferrous Sulfate (SLOW FE) 47.5 mg iron TbER tablet Take 1 Tab by mouth daily. 90 Tab 1    simvastatin (ZOCOR) 10 mg tablet Take 1 Tab by mouth nightly. 90 Tab 1    QUEtiapine (SEROQUEL) 50 mg tablet TAKE 1 TABLET BY MOUTH EVERY EVENING AS NEEDED 90 Tab 1    insulin glargine (LANTUS SOLOSTAR) 100 unit/mL (3 mL) inpn 38 Units by SubCUTAneous route daily. 30 mL 3    PARoxetine (PAXIL) 40 mg tablet Take 1 Tab by mouth daily.  90 Tab 1    Insulin Needles, Disposable, (KENDALL PEN NEEDLE) 32 gauge x 5/32\" ndle USE AS DIRECTED ONCE EVERY  Pen Needle 1    Blood-Glucose Meter (CONTOUR METER) monitoring kit DX E11.9 1 Kit 0    bimatoprost (LUMIGAN) 0.01 % ophthalmic drops Administer 1 Drop to both eyes every evening.  alcohol swabs (ALCOHOL PADS) padm Dispense 1 box 200 count. Dx 250.00. Twice daily use 200 Pad 2    cholecalciferol, vitamin d3, (VITAMIN D) 1,000 unit tablet Take  by mouth daily. No current facility-administered medications on file prior to visit. Allergies   Allergen Reactions    Amaryl [Glimepiride] Nausea Only    Penicillins Nausea and Vomiting         Health Maintenance:   Health Maintenance   Topic Date Due    Hepatitis C Screening  1955    BREAST CANCER SCRN MAMMOGRAM  02/05/2015    PAP AKA CERVICAL CYTOLOGY  03/18/2017    INFLUENZA AGE 9 TO ADULT  08/01/2017    HEMOGLOBIN A1C Q6M  12/15/2017    FOOT EXAM Q1  01/13/2018    EYE EXAM RETINAL OR DILATED Q1  05/01/2018    MICROALBUMIN Q1  06/15/2018    LIPID PANEL Q1  06/15/2018    FOBT Q 1 YEAR AGE 50-75  06/15/2018    GLAUCOMA SCREENING Q2Y  06/07/2019    DTaP/Tdap/Td series (2 - Td) 01/13/2027    ZOSTER VACCINE AGE 60>  Addressed    Pneumococcal 19-64 Medium Risk  Completed       Objective:  Visit Vitals    BP (!) 162/92 (BP 1 Location: Right arm, BP Patient Position: Sitting)    Pulse 100    Temp 98.2 °F (36.8 °C)    Resp 16    Ht 5' 3\" (1.6 m)    Wt 204 lb (92.5 kg)    SpO2 97%    BMI 36.14 kg/m2          Nurses notes and VS reviewed.       Physical Examination: General appearance - alert, well appearing, and in no distress  Chest - clear to auscultation, no wheezes, rales or rhonchi, symmetric air entry  Heart - normal rate, regular rhythm, normal S1, S2, no murmurs, rubs, clicks or gallops        Labwork and Ancillary Studies:    CBC w/Diff  Lab Results   Component Value Date/Time    WBC 12.2 06/15/2017 11:08 AM    HGB 11.3 06/15/2017 11:08 AM    PLATELET 744 23/07/4125 11:08 AM         Basic Metabolic Profile  Lab Results   Component Value Date/Time    Sodium 140 06/22/2017 02:46 PM    Potassium 3.8 06/22/2017 02:46 PM    Chloride 104 06/22/2017 02:46 PM    CO2 26 06/22/2017 02:46 PM    Anion gap 10 06/22/2017 02:46 PM    Glucose 235 06/22/2017 02:46 PM    BUN 15 06/22/2017 02:46 PM    Creatinine 1.09 06/22/2017 02:46 PM    BUN/Creatinine ratio 14 06/22/2017 02:46 PM    GFR est AA >60 06/22/2017 02:46 PM    GFR est non-AA 51 06/22/2017 02:46 PM    Calcium 8.8 06/22/2017 02:46 PM         LFT  Lab Results   Component Value Date/Time    ALT (SGPT) 64 06/15/2017 11:08 AM    AST (SGOT) 55 06/15/2017 11:08 AM    Alk.  phosphatase 75 06/15/2017 11:08 AM    Bilirubin, direct 0.1 06/15/2017 11:08 AM    Bilirubin, total 0.2 06/15/2017 11:08 AM         Cholesterol  Lab Results   Component Value Date/Time    Cholesterol, total 129 06/15/2017 11:08 AM    HDL Cholesterol 43 06/15/2017 11:08 AM    LDL, calculated 39.4 06/15/2017 11:08 AM    Triglyceride 233 06/15/2017 11:08 AM    CHOL/HDL Ratio 3.0 06/15/2017 11:08 AM

## 2017-07-14 NOTE — MR AVS SNAPSHOT
Visit Information Date & Time Provider Department Dept. Phone Encounter #  
 7/14/2017  3:00 PM Mariano Henriquez MD 3 [facility] [phone] [id number] Your Appointments 9/1/2017 11:30 AM  
Follow Up with Mariano Henriquez MD  
3 [facility] [address] [facility]) Appt Note: 4 month f/u; r/s for 4 months [address] [facility] [address] [phone]  
  
   
 [address] [facility] Maintenance Date Due Hepatitis C Screening 1955 BREAST CANCER SCRN MAMMOGRAM 2/5/2015 PAP AKA CERVICAL CYTOLOGY 3/18/2017 INFLUENZA AGE 9 TO ADULT 8/1/2017 HEMOGLOBIN A1C Q6M 12/15/2017 FOOT EXAM Q1 1/13/2018 EYE EXAM RETINAL OR DILATED Q1 5/1/2018 MICROALBUMIN Q1 6/15/2018 LIPID PANEL Q1 6/15/2018 FOBT Q 1 YEAR AGE 50-75 6/15/2018 GLAUCOMA SCREENING Q2Y 6/7/2019 DTaP/Tdap/Td series (2 - Td) 1/13/2027 Allergies as of 7/14/2017  Review Complete On: 7/14/2017 By: Dorothy Hobson LPN Severity Noted Reaction Type Reactions Amaryl [Glimepiride]  03/22/2012    Nausea Only Penicillins  10/25/2010    Nausea and Vomiting Current Immunizations  Reviewed on 6/17/2016 Name Date Pneumococcal Polysaccharide (PPSV-23) 1/13/2017  9:02 AM  
  
 Not reviewed this visit You Were Diagnosed With   
  
 Codes Comments Cataract of both eyes, unspecified cataract type    -  Primary ICD-10-CM: H26.9 ICD-9-CM: 366.9 Essential hypertension     ICD-10-CM: I10 
ICD-9-CM: 401.9 Type 2 diabetes mellitus with diabetic polyneuropathy, with long-term current use of insulin (HCC)     ICD-10-CM: E11.42, Z79.4 ICD-9-CM: 250.60, 357.2, V58.67 Preop examination     ICD-10-CM: X11.912 ICD-9-CM: V72.84 Vitals BP Pulse Temp Resp Height(growth percentile) Weight(growth percentile) (!) 162/100 100 98.2 °F (36.8 °C) 16 5' 3\" (1.6 m) 204 lb (92.5 kg) SpO2 BMI OB Status Smoking Status 97% 36.14 kg/m2 Postmenopausal Current Every Day Smoker Vitals History BMI and BSA Data Body Mass Index Body Surface Area  
 36.14 kg/m 2 2.03 m 2 Preferred Pharmacy Pharmacy Name Phone [address] [phone] Your Updated Medication List  
  
   
This list is accurate as of: 7/14/17  3:29 PM.  Always use your most recent med list.  
  
  
  
  
 alcohol swabs Padm Commonly known as:  ALCOHOL PADS Dispense 1 box 200 count. Dx 250.00. Twice daily use  
  
 amLODIPine 5 mg tablet Commonly known as:  Temo Shane Take 1 Tab by mouth daily. bimatoprost 0.01 % ophthalmic drops Commonly known as:  LUMIGAN Administer 1 Drop to both eyes every evening. Blood-Glucose Meter monitoring kit Commonly known as:  CONTOUR METER  
DX E11.9 Ferrous Sulfate 47.5 mg iron Tber tablet Commonly known as:  SLOW FE Take 1 Tab by mouth daily. glipiZIDE SR 10 mg CR tablet Commonly known as:  GLUCOTROL XL  
TAKE 1 TABLET BY MOUTH TWICE DAILY  
  
 insulin glargine 100 unit/mL (3 mL) Inpn Commonly known as:  LANTUS SOLOSTAR  
38 Units by SubCUTAneous route daily. Insulin Needles (Disposable) 32 gauge x 5/32\" Ndle Commonly known as:  Rosalva Pen Needle USE AS DIRECTED ONCE EVERY DAY  
  
 meloxicam 15 mg tablet Commonly known as:  MOBIC  
TAKE 1 TABLET BY MOUTH DAILY pantoprazole 40 mg tablet Commonly known as:  PROTONIX Take 1 Tab by mouth daily. PARoxetine 40 mg tablet Commonly known as:  PAXIL Take 1 Tab by mouth daily. QUEtiapine 50 mg tablet Commonly known as:  SEROquel TAKE 1 TABLET BY MOUTH EVERY EVENING AS NEEDED  
  
 simvastatin 10 mg tablet Commonly known as:  ZOCOR Take 1 Tab by mouth nightly. VITAMIN D3 1,000 unit tablet Generic drug:  cholecalciferol Take  by mouth daily. Prescriptions Sent to Pharmacy Refills  
 amLODIPine (NORVASC) 5 mg tablet 1 Sig: Take 1 Tab by mouth daily. Class: Normal  
 Pharmacy: [facility] [address]  #: [phone] Route: Oral  
  
Patient Instructions Cataracts: Care Instructions Your Care Instructions A cataract is a clouding of the lens of the eye. The lens focuses light on the retina at the back of the eye. Cataracts block some of the light and make it harder for you to see clearly. Cataracts often develop when you get older. Most cataracts grow slowly. At first, you may just need stronger glasses to help you see better. Later, if the cataracts grow and begin to seriously impair your vision, you can have surgery to remove them. Follow-up care is a key part of your treatment and safety. Be sure to make and go to all appointments, and call your doctor if you are having problems. Its also a good idea to know your test results and keep a list of the medicines you take. How can you care for yourself at home? · Move room lights and use window shades to avoid glare. · Use more lighting or higher-watt bulbs. · Use a magnifying glass for reading. Look for large-print books and other reading material to make reading more enjoyable. · Have your eyes checked regularly, and update your glasses when needed. · Wear sunglasses to block out harmful sunlight. Buy sunglasses that screen out ultraviolet A and B (UVA and UVB) rays. · Do not smoke. Smoking can make cataracts worse. If you need help quitting, talk to your doctor about stop-smoking programs and medicines. These can increase your chances of quitting for good. When should you call for help? Watch closely for changes in your health, and be sure to contact your doctor if: · Your vision is getting worse. · You have increasing trouble doing everyday tasks, like driving or reading the newspaper, because of your eyesight. Where can you learn more? Go to http://daina-penelope.info/. Enter P916 in the search box to learn more about \"Cataracts: Care Instructions. \" Current as of: March 3, 2017 Content Version: 11.3 © 5058-0938 [employer]. Care instructions adapted under license by [employer] (which disclaims liability or warranty for this information). If you have questions about a medical condition or this instruction, always ask your healthcare professional. [place] [address] any warranty or liability for your use of this information. Introducing [facility]! [facility] introduces [facility] patient portal. Now you can access parts of your medical record, email your doctor's office, and request medication refills online. 1. In your internet browser, go to [email or web address] 2. Click on the First Time User? Click Here link in the Sign In box. You will see the New Member Sign Up page. 3. Enter your [facility] Access Code exactly as it appears below. You will not need to use this code after youve completed the sign-up process. If you do not sign up before the expiration date, you must request a new code. · [facility] Access Code: [email or web address] Expires: 9/12/2017 11:12 AM 
 
4. Enter the last four digits of your Social Security Number (xxxx) and Date of Birth (mm/dd/yyyy) as indicated and click Submit. You will be taken to the next sign-up page. 5. Create a SendGridt ID. This will be your [facility] login ID and cannot be changed, so think of one that is secure and easy to remember. 6. Create a [facility] password. You can change your password at any time. 7. Enter your Password Reset Question and Answer. This can be used at a later time if you forget your password. 8. Enter your e-mail address. You will receive e-mail notification when new information is available in [address] 9. Click Sign Up. You can now view and download portions of your medical record. 10. Click the Download Summary menu link to download a portable copy of your medical information. If you have questions, please visit the Frequently Asked Questions section of the [facility] website. Remember, [facility] is NOT to be used for urgent needs. For medical emergencies, dial 911. Now available from your iPhone and Android! Please provide this summary of care documentation to your next provider. Your primary care clinician is listed as [address]. If you have any questions after today's visit, please call [phone].

## 2017-07-14 NOTE — PATIENT INSTRUCTIONS
Cataracts: Care Instructions  Your Care Instructions    A cataract is a clouding of the lens of the eye. The lens focuses light on the retina at the back of the eye. Cataracts block some of the light and make it harder for you to see clearly. Cataracts often develop when you get older. Most cataracts grow slowly. At first, you may just need stronger glasses to help you see better. Later, if the cataracts grow and begin to seriously impair your vision, you can have surgery to remove them. Follow-up care is a key part of your treatment and safety. Be sure to make and go to all appointments, and call your doctor if you are having problems. Its also a good idea to know your test results and keep a list of the medicines you take. How can you care for yourself at home? · Move room lights and use window shades to avoid glare. · Use more lighting or higher-watt bulbs. · Use a magnifying glass for reading. Look for large-print books and other reading material to make reading more enjoyable. · Have your eyes checked regularly, and update your glasses when needed. · Wear sunglasses to block out harmful sunlight. Buy sunglasses that screen out ultraviolet A and B (UVA and UVB) rays. · Do not smoke. Smoking can make cataracts worse. If you need help quitting, talk to your doctor about stop-smoking programs and medicines. These can increase your chances of quitting for good. When should you call for help? Watch closely for changes in your health, and be sure to contact your doctor if:  · Your vision is getting worse. · You have increasing trouble doing everyday tasks, like driving or reading the newspaper, because of your eyesight. Where can you learn more? Go to http://daina-penelope.info/. Enter P916 in the search box to learn more about \"Cataracts: Care Instructions. \"  Current as of: March 3, 2017  Content Version: 11.3  © 4285-0627 [employer].  Care instructions adapted under license by [employer] (which disclaims liability or warranty for this information). If you have questions about a medical condition or this instruction, always ask your healthcare professional. [address] any warranty or liability for your use of this information.

## 2017-07-14 NOTE — PROGRESS NOTES
Geri Sotelo is a 63 y.o. female here today for pre-op visit. 1. Have you been to the ER, urgent care clinic since your last visit? Hospitalized since your last visit? No    2. Have you seen or consulted any other health care providers outside of the [address] [place] [address] since your last visit? Include any pap smears or colon screening.  Yes Reason for visit: [facility]

## 2017-07-18 ENCOUNTER — OFFICE VISIT (OUTPATIENT)
Dept: FAMILY MEDICINE CLINIC | Age: 62
End: 2017-07-18

## 2017-07-18 VITALS
HEIGHT: 63 IN | TEMPERATURE: 98.2 F | BODY MASS INDEX: 36.14 KG/M2 | OXYGEN SATURATION: 98 % | SYSTOLIC BLOOD PRESSURE: 132 MMHG | HEART RATE: 94 BPM | DIASTOLIC BLOOD PRESSURE: 84 MMHG | WEIGHT: 204 LBS | RESPIRATION RATE: 18 BRPM

## 2017-07-18 DIAGNOSIS — I10 ESSENTIAL HYPERTENSION: Primary | ICD-10-CM

## 2017-07-18 NOTE — PROGRESS NOTES
Assessment/Plan:    David Michaels was seen today for hypertension. Diagnoses and all orders for this visit:    Essential hypertension        The plan was discussed with the patient. The patient verbalized understanding and is in agreement with the plan. All medication potential side effects were discussed with the patient.    -------------------------------------------------------------------------------------------------------------------        Altaf Marshall is a 63 y.o. female and presents with Hypertension         Subjective:  Pt returned for a brief visit today to f/u on HTN. Last time, she had been off her Norvasc and her BP was very high. On the medication now and her reading is down to normal.  Has cataract surgery tomorrow. ROS:  Constitutional: No recent weight change. No weakness/fatigue. No f/c. Skin: No rashes, change in nails/hair, itching   HENT: No HA, dizziness. No hearing loss/tinnitus. No nasal congestion/discharge. Eyes: No change in vision, double/blurred vision or eye pain/redness. Cardiovascular: No CP/palpitations. No CORADO/orthopnea/PND. Respiratory: No cough/sputum, dyspnea, wheezing. Gastointestinal: No dysphagia, reflux. No n/v. No constipation/diarrhea. No melena/rectal bleeding. Genitourinary: No dysuria, urinary hesitancy, nocturia, hematuria. No incontinence. Musculoskeletal: No joint pain/stiffness. No muscle pain/tenderness. Endo: No heat/cold intolerance, no polyuria/polydypsia. Heme: No h/o anemia. No easy bleeding/bruising. Allergy/Immunology: No seasonal rhinitis. Denies frequent colds, sinus/ear infections. Neurological: No seizures/numbness/weakness. No paresthesias. Psychiatric:  No depression, anxiety. The problem list was updated as a part of today's visit.   Patient Active Problem List   Diagnosis Code    HTN (hypertension) I10    Insomnia G47.00    GERD (gastroesophageal reflux disease) K21.9    Schizophrenia ([facility] F20.9  DM (diabetes mellitus) ([facility] E11.9    Hyperlipidemia E78.5    Diabetic retinopathy ([facility] E11.319    Cataract H26.9    Glaucoma H40.9    Sleep apnea, obstructive G47.33    Diabetic neuropathy (HCC) E11.40    Heel spur M77.30    Depression F32.9       The PSH,  were reviewed. SH:  Social History   Substance Use Topics    Smoking status: Current Every Day Smoker     Packs/day: 0.50     Years: 21.00     Types: Cigarettes    Smokeless tobacco: Current User    Alcohol use No       Medications/Allergies:  Current Outpatient Prescriptions on File Prior to Visit   Medication Sig Dispense Refill    amLODIPine (NORVASC) 10 mg tablet Take 1 Tab by mouth daily. 90 Tab 1    glipiZIDE SR (GLUCOTROL XL) 10 mg CR tablet TAKE 1 TABLET BY MOUTH TWICE DAILY 180 Tab 1    meloxicam (MOBIC) 15 mg tablet TAKE 1 TABLET BY MOUTH DAILY 90 Tab 1    pantoprazole (PROTONIX) 40 mg tablet Take 1 Tab by mouth daily. 90 Tab 1    Ferrous Sulfate (SLOW FE) 47.5 mg iron TbER tablet Take 1 Tab by mouth daily. 90 Tab 1    simvastatin (ZOCOR) 10 mg tablet Take 1 Tab by mouth nightly. 90 Tab 1    QUEtiapine (SEROQUEL) 50 mg tablet TAKE 1 TABLET BY MOUTH EVERY EVENING AS NEEDED 90 Tab 1    insulin glargine (LANTUS SOLOSTAR) 100 unit/mL (3 mL) inpn 38 Units by SubCUTAneous route daily. 30 mL 3    PARoxetine (PAXIL) 40 mg tablet Take 1 Tab by mouth daily. 90 Tab 1    Insulin Needles, Disposable, (KENDALL PEN NEEDLE) 32 gauge x 5/32\" ndle USE AS DIRECTED ONCE EVERY  Pen Needle 1    Blood-Glucose Meter (CONTOUR METER) monitoring kit DX E11.9 1 Kit 0    bimatoprost (LUMIGAN) 0.01 % ophthalmic drops Administer 1 Drop to both eyes every evening.  alcohol swabs (ALCOHOL PADS) padm Dispense 1 box 200 count. Dx 250.00. Twice daily use 200 Pad 2    cholecalciferol, vitamin d3, (VITAMIN D) 1,000 unit tablet Take  by mouth daily. No current facility-administered medications on file prior to visit.          Allergies Allergen Reactions    Amaryl [Glimepiride] Nausea Only    Penicillins Nausea and Vomiting         Health Maintenance:   Health Maintenance   Topic Date Due    Hepatitis C Screening  1955    BREAST CANCER SCRN MAMMOGRAM  02/05/2015    PAP AKA CERVICAL CYTOLOGY  03/18/2017    INFLUENZA AGE 9 TO ADULT  08/01/2017    HEMOGLOBIN A1C Q6M  12/15/2017    FOOT EXAM Q1  01/13/2018    EYE EXAM RETINAL OR DILATED Q1  05/01/2018    MICROALBUMIN Q1  06/15/2018    LIPID PANEL Q1  06/15/2018    FOBT Q 1 YEAR AGE 50-75  06/15/2018    GLAUCOMA SCREENING Q2Y  06/07/2019    DTaP/Tdap/Td series (2 - Td) 01/13/2027    ZOSTER VACCINE AGE 60>  Addressed    Pneumococcal 19-64 Medium Risk  Completed       Objective:  Visit Vitals    /84 (BP 1 Location: Left arm, BP Patient Position: Sitting)    Pulse 94    Temp 98.2 °F (36.8 °C) (Oral)    Resp 18    Ht 5' 3\" (1.6 m)    Wt 204 lb (92.5 kg)    SpO2 98%    BMI 36.14 kg/m2          Nurses notes and VS reviewed. Physical Examination: General appearance - alert, well appearing, and in no distress        Labwork and Ancillary Studies:    CBC w/Diff  Lab Results   Component Value Date/Time    WBC 12.2 06/15/2017 11:08 AM    HGB 11.3 06/15/2017 11:08 AM    PLATELET 104 16/92/7999 11:08 AM         Basic Metabolic Profile  Lab Results   Component Value Date/Time    Sodium 140 06/22/2017 02:46 PM    Potassium 3.8 06/22/2017 02:46 PM    Chloride 104 06/22/2017 02:46 PM    CO2 26 06/22/2017 02:46 PM    Anion gap 10 06/22/2017 02:46 PM    Glucose 235 06/22/2017 02:46 PM    BUN 15 06/22/2017 02:46 PM    Creatinine 1.09 06/22/2017 02:46 PM    BUN/Creatinine ratio 14 06/22/2017 02:46 PM    GFR est AA >60 06/22/2017 02:46 PM    GFR est non-AA 51 06/22/2017 02:46 PM    Calcium 8.8 06/22/2017 02:46 PM         LFT  Lab Results   Component Value Date/Time    ALT (SGPT) 64 06/15/2017 11:08 AM    AST (SGOT) 55 06/15/2017 11:08 AM    Alk.  phosphatase 75 06/15/2017 11:08 AM Bilirubin, direct 0.1 06/15/2017 11:08 AM    Bilirubin, total 0.2 06/15/2017 11:08 AM         Cholesterol  Lab Results   Component Value Date/Time    Cholesterol, total 129 06/15/2017 11:08 AM    HDL Cholesterol 43 06/15/2017 11:08 AM    LDL, calculated 39.4 06/15/2017 11:08 AM    Triglyceride 233 06/15/2017 11:08 AM    CHOL/HDL Ratio 3.0 06/15/2017 11:08 AM

## 2017-07-18 NOTE — MR AVS SNAPSHOT
Visit Information Date & Time Provider Department Dept. Phone Encounter #  
 7/18/2017  9:30 AM Gagandeep Andrew, 3 [facility] [phone] [id number] Your Appointments 9/1/2017 11:30 AM  
Follow Up with Gagandeep Andrew MD  
3 [facility]) Appt Note: 4 month f/u; r/s for 4 months [address] [facility] [id number] [phone]  
  
   
 [address] [facility] [address] [facility] Upcoming Health Maintenance Date Due Hepatitis C Screening 1955 BREAST CANCER SCRN MAMMOGRAM 2/5/2015 PAP AKA CERVICAL CYTOLOGY 3/18/2017 INFLUENZA AGE 9 TO ADULT 8/1/2017 HEMOGLOBIN A1C Q6M 12/15/2017 FOOT EXAM Q1 1/13/2018 EYE EXAM RETINAL OR DILATED Q1 5/1/2018 MICROALBUMIN Q1 6/15/2018 LIPID PANEL Q1 6/15/2018 FOBT Q 1 YEAR AGE 50-75 6/15/2018 GLAUCOMA SCREENING Q2Y 6/7/2019 DTaP/Tdap/Td series (2 - Td) 1/13/2027 Allergies as of 7/18/2017  Review Complete On: 7/14/2017 By: Gagandeep Andrew MD  
  
 Severity Noted Reaction Type Reactions Amaryl [Glimepiride]  03/22/2012    Nausea Only Penicillins  10/25/2010    Nausea and Vomiting Current Immunizations  Reviewed on 6/17/2016 Name Date Pneumococcal Polysaccharide (PPSV-23) 1/13/2017  9:02 AM  
  
 Not reviewed this visit Vitals BP Pulse Temp Resp Height(growth percentile) Weight(growth percentile) 132/84 (BP 1 Location: Left arm, BP Patient Position: Sitting) 94 98.2 °F (36.8 °C) (Oral) 18 5' 3\" (1.6 m) 204 lb (92.5 kg) SpO2 BMI OB Status Smoking Status 98% 36.14 kg/m2 Postmenopausal Current Every Day Smoker BMI and BSA Data Body Mass Index Body Surface Area  
 36.14 kg/m 2 2.03 m 2 Preferred Pharmacy Pharmacy Name Phone [address] [address] [phone] Your Updated Medication List  
  
   
This list is accurate as of: 7/18/17 10:14 AM.  Always use your most recent med list.  
  
  
  
  
 alcohol swabs Padm Commonly known as:  ALCOHOL PADS Dispense 1 box 200 count. Dx 250.00. Twice daily use  
  
 amLODIPine 10 mg tablet Commonly known as:  Sundra [place] Take 1 Tab by mouth daily. bimatoprost 0.01 % ophthalmic drops Commonly known as:  LUMIGAN Administer 1 Drop to both eyes every evening. Blood-Glucose Meter monitoring kit Commonly known as:  CONTOUR METER  
DX E11.9 Ferrous Sulfate 47.5 mg iron Tber tablet Commonly known as:  SLOW FE Take 1 Tab by mouth daily. glipiZIDE SR 10 mg CR tablet Commonly known as:  GLUCOTROL XL  
TAKE 1 TABLET BY MOUTH TWICE DAILY  
  
 insulin glargine 100 unit/mL (3 mL) Inpn Commonly known as:  LANTUS SOLOSTAR  
38 Units by SubCUTAneous route daily. Insulin Needles (Disposable) 32 gauge x 5/32\" Ndle Commonly known as:  Rosalva Pen Needle USE AS DIRECTED ONCE EVERY DAY  
  
 meloxicam 15 mg tablet Commonly known as:  MOBIC  
TAKE 1 TABLET BY MOUTH DAILY pantoprazole 40 mg tablet Commonly known as:  PROTONIX Take 1 Tab by mouth daily. PARoxetine 40 mg tablet Commonly known as:  PAXIL Take 1 Tab by mouth daily. QUEtiapine 50 mg tablet Commonly known as:  SEROquel TAKE 1 TABLET BY MOUTH EVERY EVENING AS NEEDED  
  
 simvastatin 10 mg tablet Commonly known as:  ZOCOR Take 1 Tab by mouth nightly. VITAMIN D3 1,000 unit tablet Generic drug:  cholecalciferol Take  by mouth daily. Introducing [facility]! [facility] introduces [employer] patient portal. Now you can access parts of your medical record, email your doctor's office, and request medication refills online. 1. In your internet browser, go to [email or web address]. [employer] 2. Click on the First Time User? Click Here link in the Sign In box.  You will see the New Member Sign Up page. 3. Enter your [employer] Access Code exactly as it appears below. You will not need to use this code after youve completed the sign-up process. If you do not sign up before the expiration date, you must request a new code. · [employer] Access Code: [email or web address] Expires: 9/12/2017 11:12 AM 
 
4. Enter the last four digits of your Social Security Number (xxxx) and Date of Birth (mm/dd/yyyy) as indicated and click Submit. You will be taken to the next sign-up page. 5. Create a NetEffect ID. This will be your NetEffect login ID and cannot be changed, so think of one that is secure and easy to remember. 6. Create a [employer] password. You can change your password at any time. 7. Enter your Password Reset Question and Answer. This can be used at a later time if you forget your password. 8. Enter your e-mail address. You will receive e-mail notification when new information is available in [address]. 9. Click Sign Up. You can now view and download portions of your medical record. 10. Click the Download Summary menu link to download a portable copy of your medical information. If you have questions, please visit the Frequently Asked Questions section of the [employer] website. Remember, NetEffect is NOT to be used for urgent needs. For medical emergencies, dial 911. Now available from your iPhone and Android! Please provide this summary of care documentation to your next provider. Your primary care clinician is listed as [address]. If you have any questions after today's visit, please call [phone].

## 2017-07-18 NOTE — PROGRESS NOTES
Wolfgang Blanca is a 63 y.o. female here today for follow up on high blood pressure     1. Have you been to the ER, urgent care clinic or hospitalized since your last visit? NO.     2. Have you seen or consulted any other health care providers outside of the [address] [place] [address] since your last visit (Include any pap smears or colon screening)? NO      Do you have an Advanced Directive? NO    Would you like information on Advanced Directives?  NO  Learning Assessment 3/10/2014   PRIMARY LEARNER Patient   PRIMARY LANGUAGE ENGLISH   LEARNER PREFERENCE PRIMARY OTHER (COMMENT)   ANSWERED BY patient   RELATIONSHIP SELF

## 2017-09-01 ENCOUNTER — OFFICE VISIT (OUTPATIENT)
Dept: FAMILY MEDICINE CLINIC | Age: 62
End: 2017-09-01

## 2017-09-01 VITALS
TEMPERATURE: 98.2 F | DIASTOLIC BLOOD PRESSURE: 64 MMHG | OXYGEN SATURATION: 98 % | BODY MASS INDEX: 35.26 KG/M2 | RESPIRATION RATE: 18 BRPM | SYSTOLIC BLOOD PRESSURE: 120 MMHG | WEIGHT: 199 LBS | HEIGHT: 63 IN | HEART RATE: 78 BPM

## 2017-09-01 DIAGNOSIS — Z79.4 TYPE 2 DIABETES MELLITUS WITH DIABETIC POLYNEUROPATHY, WITH LONG-TERM CURRENT USE OF INSULIN (HCC): Primary | ICD-10-CM

## 2017-09-01 DIAGNOSIS — E11.42 TYPE 2 DIABETES MELLITUS WITH DIABETIC POLYNEUROPATHY, WITH LONG-TERM CURRENT USE OF INSULIN (HCC): Primary | ICD-10-CM

## 2017-09-01 LAB — HBA1C MFR BLD HPLC: 8.6 %

## 2017-09-01 NOTE — PROGRESS NOTES
Assessment/Plan:    *Diagnoses and all orders for this visit:    1. Type 2 diabetes mellitus with diabetic polyneuropathy, with long-term current use of insulin (HCC)  -     AMB POC HEMOGLOBIN A1C        F/u 3 months. No change in meds. She will work on diet, exercise, needs to lose weight. I have reviewed/discussed the above normal BMI with the patient. I have recommended the following interventions: dietary management education, guidance, and counseling and encourage exercise . Crystal Stout The plan was discussed with the patient. The patient verbalized understanding and is in agreement with the plan. All medication potential side effects were discussed with the patient.    -------------------------------------------------------------------------------------------------------------------        Agnieszka Morales is a 63 y.o. female and presents with Hypertension and Diabetes         Subjective:  Pt here for f/u. DM:  A1c in office is 8.6%. Has risen since last visit. She has not been following her diet. ROS:  Constitutional: No recent weight change. No weakness/fatigue. No f/c. Skin: No rashes, change in nails/hair, itching   HENT: No HA, dizziness. No hearing loss/tinnitus. No nasal congestion/discharge. Eyes: No change in vision, double/blurred vision or eye pain/redness. Cardiovascular: No CP/palpitations. No CORADO/orthopnea/PND. Respiratory: No cough/sputum, dyspnea, wheezing. Gastointestinal: No dysphagia, reflux. No n/v. No constipation/diarrhea. No melena/rectal bleeding. Genitourinary: No dysuria, urinary hesitancy, nocturia, hematuria. No incontinence. Musculoskeletal: No joint pain/stiffness. No muscle pain/tenderness. Endo: No heat/cold intolerance, no polyuria/polydypsia. Heme: No h/o anemia. No easy bleeding/bruising. Allergy/Immunology: No seasonal rhinitis. Denies frequent colds, sinus/ear infections. Neurological: No seizures/numbness/weakness.   No paresthesias. Psychiatric:  No depression, anxiety. The problem list was updated as a part of today's visit. Patient Active Problem List   Diagnosis Code    HTN (hypertension) I10    Insomnia G47.00    GERD (gastroesophageal reflux disease) K21.9    Schizophrenia ([facility] F20.9    [facility] mellitus) [facility] E11.9    Hyperlipidemia E78.5    Di[facility] retinopathy [facility] E11.319    Cataract H26.9    Glaucoma H40.9    Sleep apnea, obstructive G47.33    Diabetic neuropathy (HCC) E11.40    Heel spur M77.30    Depression F32.9       The PSH, FH were reviewed. SH:  Social History   Substance Use Topics    Smoking status: Current Every Day Smoker     Packs/day: 0.50     Years: 21.00     Types: Cigarettes    Smokeless tobacco: Current User    Alcohol use No       Medications/Allergies:  Current Outpatient Prescriptions on File Prior to Visit   Medication Sig Dispense Refill    amLODIPine (NORVASC) 10 mg tablet Take 1 Tab by mouth daily. 90 Tab 1    glipiZIDE SR (GLUCOTROL XL) 10 mg CR tablet TAKE 1 TABLET BY MOUTH TWICE DAILY 180 Tab 1    meloxicam (MOBIC) 15 mg tablet TAKE 1 TABLET BY MOUTH DAILY 90 Tab 1    pantoprazole (PROTONIX) 40 mg tablet Take 1 Tab by mouth daily. 90 Tab 1    Ferrous Sulfate (SLOW FE) 47.5 mg iron TbER tablet Take 1 Tab by mouth daily. 90 Tab 1    simvastatin (ZOCOR) 10 mg tablet Take 1 Tab by mouth nightly. 90 Tab 1    QUEtiapine (SEROQUEL) 50 mg tablet TAKE 1 TABLET BY MOUTH EVERY EVENING AS NEEDED 90 Tab 1    insulin glargine (LANTUS SOLOSTAR) 100 unit/mL (3 mL) inpn 38 Units by SubCUTAneous route daily. 30 mL 3    PARoxetine (PAXIL) 40 mg tablet Take 1 Tab by mouth daily. 90 Tab 1    Insulin Needles, Disposable, (KENDALL PEN NEEDLE) 32 gauge x 5/32\" ndle USE AS DIRECTED ONCE EVERY  Pen Needle 1    Blood-Glucose Meter (CONTOUR METER) monitoring kit DX E11.9 1 Kit 0    alcohol swabs (ALCOHOL PADS) padm Dispense 1 box 200 count. Dx 250.00.  Twice daily use 200 Pad 2    cholecalciferol, vitamin d3, (VITAMIN D) 1,000 unit tablet Take  by mouth daily. No current facility-administered medications on file prior to visit. Allergies   Allergen Reactions    Amaryl [Glimepiride] Nausea Only    Penicillins Nausea and Vomiting         Health Maintenance:   Health Maintenance   Topic Date Due    Hepatitis C Screening  1955    BREAST CANCER SCRN MAMMOGRAM  02/05/2015    PAP AKA CERVICAL CYTOLOGY  03/18/2017    INFLUENZA AGE 9 TO ADULT  08/01/2017    HEMOGLOBIN A1C Q6M  12/15/2017    FOOT EXAM Q1  01/13/2018    EYE EXAM RETINAL OR DILATED Q1  05/01/2018    MICROALBUMIN Q1  06/15/2018    LIPID PANEL Q1  06/15/2018    FOBT Q 1 YEAR AGE 50-75  06/15/2018    GLAUCOMA SCREENING Q2Y  06/07/2019    DTaP/Tdap/Td series (2 - Td) 01/13/2027    ZOSTER VACCINE AGE 60>  Addressed    Pneumococcal 19-64 Medium Risk  Completed       Objective:  Visit Vitals    /64 (BP 1 Location: Right arm, BP Patient Position: Sitting)    Pulse 78    Temp 98.2 °F (36.8 °C) (Oral)    Resp 18    Ht 5' 3\" (1.6 m)    Wt 199 lb (90.3 kg)    SpO2 98%    BMI 35.25 kg/m2          Nurses notes and VS reviewed.       Physical Examination: General appearance - alert, well appearing, and in no distress  Chest - clear to auscultation, no wheezes, rales or rhonchi, symmetric air entry  Heart - normal rate, regular rhythm, normal S1, S2, no murmurs, rubs, clicks or gallops        Labwork and Ancillary Studies:    CBC w/Diff  Lab Results   Component Value Date/Time    WBC 12.2 06/15/2017 11:08 AM    HGB 11.3 06/15/2017 11:08 AM    PLATELET 184 58/33/4611 11:08 AM         Basic Metabolic Profile  Lab Results   Component Value Date/Time    Sodium 140 06/22/2017 02:46 PM    Potassium 3.8 06/22/2017 02:46 PM    Chloride 104 06/22/2017 02:46 PM    CO2 26 06/22/2017 02:46 PM    Anion gap 10 06/22/2017 02:46 PM    Glucose 235 06/22/2017 02:46 PM    BUN 15 06/22/2017 02:46 PM Creatinine 1.09 06/22/2017 02:46 PM    BUN/Creatinine ratio 14 06/22/2017 02:46 PM    GFR est AA >60 06/22/2017 02:46 PM    GFR est non-AA 51 06/22/2017 02:46 PM    Calcium 8.8 06/22/2017 02:46 PM         LFT  Lab Results   Component Value Date/Time    ALT (SGPT) 64 06/15/2017 11:08 AM    AST (SGOT) 55 06/15/2017 11:08 AM    Alk.  phosphatase 75 06/15/2017 11:08 AM    Bilirubin, direct 0.1 06/15/2017 11:08 AM    Bilirubin, total 0.2 06/15/2017 11:08 AM         Cholesterol  Lab Results   Component Value Date/Time    Cholesterol, total 129 06/15/2017 11:08 AM    HDL Cholesterol 43 06/15/2017 11:08 AM    LDL, calculated 39.4 06/15/2017 11:08 AM    Triglyceride 233 06/15/2017 11:08 AM    CHOL/HDL Ratio 3.0 06/15/2017 11:08 AM

## 2017-09-01 NOTE — PATIENT INSTRUCTIONS

## 2017-09-01 NOTE — PROGRESS NOTES
Taco Agustin is a 63 y.o. female here today for 4 month follow up . Chest pain left side last week sometime when laying on either side but sitting straight up it went away     1. Have you been to the ER, urgent care clinic or hospitalized since your last visit? NO.     2. Have you seen or consulted any other health care providers outside of the [address] [place] [address] since your last visit (Include any pap smears or colon screening)? NO      Do you have an Advanced Directive? NO    Would you like information on Advanced Directives?  NO    Learning Assessment 3/10/2014   PRIMARY LEARNER Patient   PRIMARY LANGUAGE ENGLISH   LEARNER PREFERENCE PRIMARY OTHER (COMMENT)   ANSWERED BY patient   RELATIONSHIP SELF

## 2017-09-01 NOTE — MR AVS SNAPSHOT
Visit Information Date & Time Provider Department Dept. Phone Encounter #  
 9/1/2017 11:30 AM Jayden Morales, [place] [facility] [phone] [id number] Upcoming Health Maintenance Date Due Hepatitis C Screening 1955 BREAST CANCER SCRN MAMMOGRAM 2/5/2015 PAP AKA CERVICAL CYTOLOGY 3/18/2017 INFLUENZA AGE 9 TO ADULT 8/1/2017 HEMOGLOBIN A1C Q6M 12/15/2017 FOOT EXAM Q1 1/13/2018 EYE EXAM RETINAL OR DILATED Q1 5/1/2018 MICROALBUMIN Q1 6/15/2018 LIPID PANEL Q1 6/15/2018 FOBT Q 1 YEAR AGE 50-75 6/15/2018 GLAUCOMA SCREENING Q2Y 6/7/2019 DTaP/Tdap/Td series (2 - Td) 1/13/2027 Allergies as of 9/1/2017  Review Complete On: 9/1/2017 By: Jona Desai LPN Severity Noted Reaction Type Reactions Amaryl [Glimepiride]  03/22/2012    Nausea Only Penicillins  10/25/2010    Nausea and Vomiting Current Immunizations  Reviewed on 6/17/2016 Name Date Pneumococcal Polysaccharide (PPSV-23) 1/13/2017  9:02 AM  
  
 Not reviewed this visit You Were Diagnosed With   
  
 Codes Comments Type 2 diabetes mellitus with diabetic polyneuropathy, with long-term current use of insulin (HCC)    -  Primary ICD-10-CM: E11.42, Z79.4 ICD-9-CM: 250.60, 357.2, V58.67 Vitals BP Pulse Temp Resp Height(growth percentile) Weight(growth percentile) 120/64 (BP 1 Location: Right arm, BP Patient Position: Sitting) 78 98.2 °F (36.8 °C) (Oral) 18 5' 3\" (1.6 m) 199 lb (90.3 kg) SpO2 BMI OB Status Smoking Status 98% 35.25 kg/m2 Postmenopausal Current Every Day Smoker Vitals History BMI and BSA Data Body Mass Index Body Surface Area  
 35.25 kg/m 2 2 m 2 Preferred Pharmacy Pharmacy Name Phone [address], [address] [facility] [phone] Your Updated Medication List  
  
   
 This list is accurate as of: 9/1/17 12:21 PM.  Always use your most recent med list.  
  
  
  
  
 alcohol swabs Padm Commonly known as:  ALCOHOL PADS Dispense 1 box 200 count. Dx 250.00. Twice daily use  
  
 amLODIPine 10 mg tablet Commonly known as:  Manuelsabrina Staplesch Take 1 Tab by mouth daily. Blood-Glucose Meter monitoring kit Commonly known as:  CONTOUR METER  
DX E11.9 Ferrous Sulfate 47.5 mg iron Tber tablet Commonly known as:  SLOW FE Take 1 Tab by mouth daily. glipiZIDE SR 10 mg CR tablet Commonly known as:  GLUCOTROL XL  
TAKE 1 TABLET BY MOUTH TWICE DAILY  
  
 insulin glargine 100 unit/mL (3 mL) Inpn Commonly known as:  LANTUS SOLOSTAR  
38 Units by SubCUTAneous route daily. Insulin Needles (Disposable) 32 gauge x 5/32\" Ndle Commonly known as:  Rosalva Pen Needle USE AS DIRECTED ONCE EVERY DAY  
  
 meloxicam 15 mg tablet Commonly known as:  MOBIC  
TAKE 1 TABLET BY MOUTH DAILY pantoprazole 40 mg tablet Commonly known as:  PROTONIX Take 1 Tab by mouth daily. PARoxetine 40 mg tablet Commonly known as:  PAXIL Take 1 Tab by mouth daily. QUEtiapine 50 mg tablet Commonly known as:  SEROquel TAKE 1 TABLET BY MOUTH EVERY EVENING AS NEEDED  
  
 simvastatin 10 mg tablet Commonly known as:  ZOCOR Take 1 Tab by mouth nightly. VITAMIN D3 1,000 unit tablet Generic drug:  cholecalciferol Take  by mouth daily. We Performed the Following AMB POC HEMOGLOBIN A1C [12101 CPT(R)] Patient Instructions Learning About Diabetes Food Guidelines Your Care Instructions Meal planning is important to manage diabetes. It helps keep your blood sugar at a target level (which you set with your doctor). You don't have to eat special foods. You can eat what your family eats, including sweets once in a while. But you do have to pay attention to how often you eat and how much you eat of certain foods. You may want to work with a dietitian or a certified diabetes educator (CDE) to help you plan meals and snacks. A dietitian or CDE can also help you lose weight if that is one of your goals. What should you know about eating carbs? Managing the amount of carbohydrate (carbs) you eat is an important part of healthy meals when you have diabetes. Carbohydrate is found in many foods. · Learn which foods have carbs. And learn the amounts of carbs in different foods. ¨ Bread, cereal, pasta, and rice have about 15 grams of carbs in a serving. A serving is 1 slice of bread (1 ounce), ½ cup of cooked cereal, or 1/3 cup of cooked pasta or rice. ¨ Fruits have 15 grams of carbs in a serving. A serving is 1 small fresh fruit, such as an apple or orange; ½ of a banana; ½ cup of cooked or canned fruit; ½ cup of fruit juice; 1 cup of melon or raspberries; or 2 tablespoons of dried fruit. ¨ Milk and no-sugar-added yogurt have 15 grams of carbs in a serving. A serving is 1 cup of milk or 2/3 cup of no-sugar-added yogurt. ¨ Starchy vegetables have 15 grams of carbs in a serving. A serving is ½ cup of mashed potatoes or sweet potato; 1 cup winter squash; ½ of a small baked potato; ½ cup of cooked beans; or ½ cup cooked corn or green peas. · Learn how much carbs to eat each day and at each meal. A dietitian or CDE can teach you how to keep track of the amount of carbs you eat. This is called carbohydrate counting. · If you are not sure how to count carbohydrate grams, use the Plate Method to plan meals. It is a good, quick way to make sure that you have a balanced meal. It also helps you spread carbs throughout the day. ¨ Divide your plate by types of foods. Put non-starchy vegetables on half the plate, meat or other protein food on one-quarter of the plate, and a grain or starchy vegetable in the final quarter of the plate.  To this you can add a small piece of fruit and 1 cup of milk or yogurt, depending on how many carbs you are supposed to eat at a meal. 
· Try to eat about the same amount of carbs at each meal. Do not \"save up\" your daily allowance of carbs to eat at one meal. 
· Proteins have very little or no carbs per serving. Examples of proteins are beef, chicken, turkey, fish, eggs, tofu, cheese, cottage cheese, and peanut butter. A serving size of meat is 3 ounces, which is about the size of a deck of cards. Examples of meat substitute serving sizes (equal to 1 ounce of meat) are 1/4 cup of cottage cheese, 1 egg, 1 tablespoon of peanut butter, and ½ cup of tofu. How can you eat out and still eat healthy? · Learn to estimate the serving sizes of foods that have carbohydrate. If you measure food at home, it will be easier to estimate the amount in a serving of restaurant food. · If the meal you order has too much carbohydrate (such as potatoes, corn, or baked beans), ask to have a low-carbohydrate food instead. Ask for a salad or green vegetables. · If you use insulin, check your blood sugar before and after eating out to help you plan how much to eat in the future. · If you eat more carbohydrate at a meal than you had planned, take a walk or do other exercise. This will help lower your blood sugar. What else should you know? · Limit saturated fat, such as the fat from meat and dairy products. This is a healthy choice because people who have diabetes are at higher risk of heart disease. So choose lean cuts of meat and nonfat or low-fat dairy products. Use olive or canola oil instead of butter or shortening when cooking. · Don't skip meals. Your blood sugar may drop too low if you skip meals and take insulin or certain medicines for diabetes. · Check with your doctor before you drink alcohol. Alcohol can cause your blood sugar to drop too low. Alcohol can also cause a bad reaction if you take certain diabetes medicines. Follow-up care is a key part of your treatment and safety.  Be sure to make and go to all appointments, and call your doctor if you are having problems. It's also a good idea to know your test results and keep a list of the medicines you take. Where can you learn more? Go to http://daina-penelope.info/. Enter Q524 in the search box to learn more about \"Learning About Diabetes Food Guidelines. \" Current as of: March 13, 2017 Content Version: 11.3 © 4554-1326 [employer]. Care instructions adapted under license by [employer] (which disclaims liability or warranty for this information). If you have questions about a medical condition or this instruction, always ask your healthcare professional. [address] any warranty or liability for your use of this information. Introducing [facility]! Basia Duque introduces SOAK ([employer]) patient portal. Now you can access parts of your medical record, email your doctor's office, and request medication refills online. 1. In your internet browser, go to [email or web address] Kid Kent 2. Click on the First Time User? Click Here link in the Sign In box. You will see the New Member Sign Up page. 3. Enter your SOAK ([employer]) Access Code exactly as it appears below. You will not need to use this code after youve completed the sign-up process. If you do not sign up before the expiration date, you must request a new code. · SOAK ([employer]) Access Code: [email or web address] Expires: 9/12/2017 11:12 AM 
 
4. Enter the last four digits of your Social Security Number (xxxx) and Date of Birth (mm/dd/yyyy) as indicated and click Submit. You will be taken to the next sign-up page. 5. Create a gumit ID. This will be your SOAK ([employer]) login ID and cannot be changed, so think of one that is secure and easy to remember. 6. Create a SOAK ([employer]) password. You can change your password at any time. 7. Enter your Password Reset Question and Answer.  This can be used at a later time if you forget your password. 8. Enter your e-mail address. You will receive e-mail notification when new information is available in [address] 9. Click Sign Up. You can now view and download portions of your medical record. 10. Click the Download Summary menu link to download a portable copy of your medical information. If you have questions, please visit the Frequently Asked Questions section of the Direct Hit website. Remember, Direct Hit is NOT to be used for urgent needs. For medical emergencies, dial 911. Now available from your iPhone and Android! Please provide this summary of care documentation to your next provider. Your primary care clinician is listed as [address]. If you have any questions after today's visit, please call [phone].

## 2017-12-04 ENCOUNTER — OFFICE VISIT (OUTPATIENT)
Dept: FAMILY MEDICINE CLINIC | Age: 62
End: 2017-12-04

## 2017-12-04 VITALS
DIASTOLIC BLOOD PRESSURE: 82 MMHG | SYSTOLIC BLOOD PRESSURE: 130 MMHG | HEART RATE: 100 BPM | TEMPERATURE: 98.7 F | RESPIRATION RATE: 20 BRPM | BODY MASS INDEX: 37.92 KG/M2 | OXYGEN SATURATION: 98 % | HEIGHT: 63 IN | WEIGHT: 214 LBS

## 2017-12-04 DIAGNOSIS — E78.00 PURE HYPERCHOLESTEROLEMIA: ICD-10-CM

## 2017-12-04 DIAGNOSIS — E11.42 TYPE 2 DIABETES MELLITUS WITH DIABETIC POLYNEUROPATHY, WITH LONG-TERM CURRENT USE OF INSULIN (HCC): Primary | ICD-10-CM

## 2017-12-04 DIAGNOSIS — Z12.39 BREAST CANCER SCREENING: ICD-10-CM

## 2017-12-04 DIAGNOSIS — Z79.4 TYPE 2 DIABETES MELLITUS WITH DIABETIC POLYNEUROPATHY, WITH LONG-TERM CURRENT USE OF INSULIN (HCC): Primary | ICD-10-CM

## 2017-12-04 DIAGNOSIS — I10 ESSENTIAL HYPERTENSION: ICD-10-CM

## 2017-12-04 LAB — HBA1C MFR BLD HPLC: 7.4 %

## 2017-12-04 RX ORDER — SIMVASTATIN 10 MG/1
10 TABLET, FILM COATED ORAL
Qty: 90 TAB | Refills: 1 | Status: SHIPPED | OUTPATIENT
Start: 2017-12-04 | End: 2018-04-05 | Stop reason: SDUPTHER

## 2017-12-04 RX ORDER — AMLODIPINE BESYLATE 10 MG/1
10 TABLET ORAL DAILY
Qty: 90 TAB | Refills: 1 | Status: SHIPPED | OUTPATIENT
Start: 2017-12-04 | End: 2018-04-06 | Stop reason: SDUPTHER

## 2017-12-04 NOTE — PROGRESS NOTES
Rebecca Oconnor is a 63 y.o. female is here for a general follow up. She has complains of memory loss       1. Have you been to the ER, urgent care clinic since your last visit? Hospitalized since your last visit? No    2. Have you seen or consulted any other health care providers outside of the [facility] since your last visit? Include any pap smears or colon screening. She states she saw a doctor who increased her seroquel.      Health Maintenance Due   Topic Date Due    Hepatitis C Screening  1955    BREAST CANCER SCRN MAMMOGRAM  02/05/2015    PAP AKA CERVICAL CYTOLOGY  03/18/2017    Influenza Age 9 to Adult  08/01/2017

## 2017-12-04 NOTE — PROGRESS NOTES
Assessment/Plan:    *Diagnoses and all orders for this visit:    1. Type 2 diabetes mellitus with diabetic polyneuropathy, with long-term current use of insulin (HCC)  -     AMB POC HEMOGLOBIN A1C    2. Pure hypercholesterolemia  -     simvastatin (ZOCOR) 10 mg tablet; Take 1 Tab by mouth nightly. 3. Essential hypertension  -     amLODIPine (NORVASC) 10 mg tablet; Take 1 Tab by mouth daily. 4. Breast cancer screening  -     [facility] MAMMO BI SCREENING INCL CAD; Future        F/u 3 months. Will discuss her memory concerns again on that visit. I believe that a good part of her issue comes from her depression and emotional turmoil. We will re-evaluate once she feels emotionally more settled. The plan was discussed with the patient. The patient verbalized understanding and is in agreement with the plan. All medication potential side effects were discussed with the patient.    -------------------------------------------------------------------------------------------------------------------        rGabiel Amaro is a 63 y.o. female and presents with Blood Pressure Check and Memory Loss         Subjective:  Pt here for f/u. DM: current A1c is 7.4%! So much better. HTN: controlled. Has run out of Amlodipine. HLD: likely elevated because she ran out of Zocor. Will restart this now. She has been depressed, had a difficult time about 2 weeks ago. She keeps referring to \"these people came to help me and have been working with me\". She is not clear on who these people are but when I asked if they were from the [address], she said yes. He Kirkpatrick they are also trying to get her set up with a psychiatrist.    Has concernes about her memory, ever since she has been going through this emotional time, she has noted difficulty with remembering things. ROS:  Constitutional: No recent weight change. No weakness/fatigue. No f/c. Skin: No rashes, change in nails/hair, itching   HENT: No HA, dizziness. No hearing loss/tinnitus. No nasal congestion/discharge. Eyes: No change in vision, double/blurred vision or eye pain/redness. Cardiovascular: No CP/palpitations. No CORADO/orthopnea/PND. Respiratory: No cough/sputum, dyspnea, wheezing. Gastointestinal: No dysphagia, reflux. No n/v. No constipation/diarrhea. No melena/rectal bleeding. Genitourinary: No dysuria, urinary hesitancy, nocturia, hematuria. No incontinence. Musculoskeletal: No joint pain/stiffness. No muscle pain/tenderness. Endo: No heat/cold intolerance, no polyuria/polydypsia. Heme: No h/o anemia. No easy bleeding/bruising. Allergy/Immunology: No seasonal rhinitis. Denies frequent colds, sinus/ear infections. Neurological: No seizures/numbness/weakness. No paresthesias. Psychiatric:  No depression, anxiety. The problem list was updated as a part of today's visit. Patient Active Problem List   Diagnosis Code    HTN (hypertension) I10    Insomnia G47.00    GERD (gastroesophageal reflux disease) K21.9    Schizophrenia ([address] F20.9    DM (diabetes mellitus) ([address] E11.9    Hyperlipidemia E78.5    Diabetic retinopathy ([address] E11.319    Cataract H26.9    Glaucoma H40.9    Sleep apnea, obstructive G47.33    Diabetic neuropathy (HCC) E11.40    Heel spur M77.30    Depression F32.9       The PSH, FH were reviewed. SH:  Social History   Substance Use Topics    Smoking status: Current Every Day Smoker     Packs/day: 0.50     Years: 21.00     Types: Cigarettes    Smokeless tobacco: Current User    Alcohol use No       Medications/Allergies:  Current Outpatient Prescriptions on File Prior to Visit   Medication Sig Dispense Refill    glipiZIDE SR (GLUCOTROL XL) 10 mg CR tablet TAKE 1 TABLET BY MOUTH TWICE DAILY 180 Tab 1    meloxicam (MOBIC) 15 mg tablet TAKE 1 TABLET BY MOUTH DAILY 90 Tab 1    pantoprazole (PROTONIX) 40 mg tablet Take 1 Tab by mouth daily.  90 Tab 1    QUEtiapine (SEROQUEL) 50 mg tablet TAKE 1 TABLET BY MOUTH EVERY EVENING AS NEEDED (Patient taking differently: 100 mg. TAKE 1 TABLET BY MOUTH EVERY EVENING AS NEEDED) 90 Tab 1    insulin glargine (LANTUS SOLOSTAR) 100 unit/mL (3 mL) inpn 38 Units by SubCUTAneous route daily. 30 mL 3    PARoxetine (PAXIL) 40 mg tablet Take 1 Tab by mouth daily. 90 Tab 1    Insulin Needles, Disposable, (KENDALL PEN NEEDLE) 32 gauge x 5/32\" ndle USE AS DIRECTED ONCE EVERY  Pen Needle 1    Blood-Glucose Meter (CONTOUR METER) monitoring kit DX E11.9 1 Kit 0    alcohol swabs (ALCOHOL PADS) padm Dispense 1 box 200 count. Dx 250.00. Twice daily use 200 Pad 2    cholecalciferol, vitamin d3, (VITAMIN D) 1,000 unit tablet Take  by mouth daily.  Ferrous Sulfate (SLOW FE) 47.5 mg iron TbER tablet Take 1 Tab by mouth daily. 90 Tab 1     No current facility-administered medications on file prior to visit. Allergies   Allergen Reactions    Amaryl [Glimepiride] Nausea Only    Penicillins Nausea and Vomiting         Health Maintenance:   Health Maintenance   Topic Date Due    Hepatitis C Screening  1955    BREAST CANCER SCRN MAMMOGRAM  02/05/2015    PAP AKA CERVICAL CYTOLOGY  03/18/2017    FOOT EXAM Q1  01/13/2018    HEMOGLOBIN A1C Q6M  03/01/2018    EYE EXAM RETINAL OR DILATED Q1  05/01/2018    MICROALBUMIN Q1  06/15/2018    LIPID PANEL Q1  06/15/2018    FOBT Q 1 YEAR AGE 50-75  06/15/2018    GLAUCOMA SCREENING Q2Y  06/07/2019    DTaP/Tdap/Td series (2 - Td) 01/13/2027    ZOSTER VACCINE AGE 60>  Addressed    Pneumococcal 19-64 Medium Risk  Completed    Influenza Age 5 to Adult  Addressed       Objective:  Visit Vitals    /82 (BP 1 Location: Left arm, BP Patient Position: Sitting)    Pulse 100    Temp 98.7 °F (37.1 °C) (Oral)    Resp 20    Ht 5' 3\" (1.6 m)    Wt 214 lb (97.1 kg)    SpO2 98%    BMI 37.91 kg/m2          Nurses notes and VS reviewed.       Physical Examination: General appearance - alert, well appearing, and in no distress  Chest - clear to auscultation, no wheezes, rales or rhonchi, symmetric air entry  Heart - normal rate, regular rhythm, normal S1, S2, no murmurs, rubs, clicks or gallops  Abdomen - soft, nontender, nondistended, no masses or organomegaly        Labwork and Ancillary Studies:    CBC w/Diff  Lab Results   Component Value Date/Time    WBC 12.2 06/15/2017 11:08 AM    HGB 11.3 06/15/2017 11:08 AM    PLATELET 932 65/89/5829 11:08 AM         Basic Metabolic Profile  Lab Results   Component Value Date/Time    Sodium 140 06/22/2017 02:46 PM    Potassium 3.8 06/22/2017 02:46 PM    Chloride 104 06/22/2017 02:46 PM    CO2 26 06/22/2017 02:46 PM    Anion gap 10 06/22/2017 02:46 PM    Glucose 235 06/22/2017 02:46 PM    BUN 15 06/22/2017 02:46 PM    Creatinine 1.09 06/22/2017 02:46 PM    BUN/Creatinine ratio 14 06/22/2017 02:46 PM    GFR est AA >60 06/22/2017 02:46 PM    GFR est non-AA 51 06/22/2017 02:46 PM    Calcium 8.8 06/22/2017 02:46 PM         LFT  Lab Results   Component Value Date/Time    ALT (SGPT) 64 06/15/2017 11:08 AM    AST (SGOT) 55 06/15/2017 11:08 AM    Alk.  phosphatase 75 06/15/2017 11:08 AM    Bilirubin, direct 0.1 06/15/2017 11:08 AM    Bilirubin, total 0.2 06/15/2017 11:08 AM         Cholesterol  Lab Results   Component Value Date/Time    Cholesterol, total 129 06/15/2017 11:08 AM    HDL Cholesterol 43 06/15/2017 11:08 AM    LDL, calculated 39.4 06/15/2017 11:08 AM    Triglyceride 233 06/15/2017 11:08 AM    CHOL/HDL Ratio 3.0 06/15/2017 11:08 AM

## 2017-12-04 NOTE — PATIENT INSTRUCTIONS

## 2017-12-04 NOTE — MR AVS SNAPSHOT
Visit Information Date & Time Provider Department Dept. Phone Encounter #  
 12/4/2017  1:00 PM Jono Khoury, 3 [facility] [phone] [id number] Upcoming Health Maintenance Date Due Hepatitis C Screening 1955 BREAST CANCER SCRN MAMMOGRAM 2/5/2015 PAP AKA CERVICAL CYTOLOGY 3/18/2017 FOOT EXAM Q1 1/13/2018 HEMOGLOBIN A1C Q6M 3/1/2018 EYE EXAM RETINAL OR DILATED Q1 5/1/2018 MICROALBUMIN Q1 6/15/2018 LIPID PANEL Q1 6/15/2018 FOBT Q 1 YEAR AGE 50-75 6/15/2018 GLAUCOMA SCREENING Q2Y 6/7/2019 DTaP/Tdap/Td series (2 - Td) 1/13/2027 Allergies as of 12/4/2017  Review Complete On: 12/4/2017 By: Jono Khoury MD  
  
 Severity Noted Reaction Type Reactions Amaryl [Glimepiride]  03/22/2012    Nausea Only Penicillins  10/25/2010    Nausea and Vomiting Current Immunizations  Reviewed on 6/17/2016 Name Date Pneumococcal Polysaccharide (PPSV-23) 1/13/2017  9:02 AM  
  
 Not reviewed this visit You Were Diagnosed With   
  
 Codes Comments Type 2 diabetes mellitus with diabetic polyneuropathy, with long-term current use of insulin (HCC)    -  Primary ICD-10-CM: E11.42, Z79.4 ICD-9-CM: 250.60, 357.2, V58.67 Pure hypercholesterolemia     ICD-10-CM: E78.00 ICD-9-CM: 272.0 Essential hypertension     ICD-10-CM: I10 
ICD-9-CM: 401.9 Breast cancer screening     ICD-10-CM: Z12.31 
ICD-9-CM: V76.10 Vitals BP Pulse Temp Resp Height(growth percentile) Weight(growth percentile) 130/82 (BP 1 Location: Left arm, BP Patient Position: Sitting) 100 98.7 °F (37.1 °C) (Oral) 20 5' 3\" (1.6 m) 214 lb (97.1 kg) SpO2 BMI OB Status Smoking Status 98% 37.91 kg/m2 Postmenopausal Current Every Day Smoker BMI and BSA Data Body Mass Index Body Surface Area  
 37.91 kg/m 2 2.08 m 2 Preferred Pharmacy Pharmacy Name Phone [address] [address] [facility], [address] [place] [address] AT [address] [phone] Your Updated Medication List  
  
   
This list is accurate as of: 12/4/17  2:13 PM.  Always use your most recent med list.  
  
  
  
  
 alcohol swabs Padm Commonly known as:  ALCOHOL PADS Dispense 1 box 200 count. Dx 250.00. Twice daily use  
  
 amLODIPine 10 mg tablet Commonly known as:  Wandra Mima Take 1 Tab by mouth daily. Blood-Glucose Meter monitoring kit Commonly known as:  CONTOUR METER  
DX E11.9 Ferrous Sulfate 47.5 mg iron Tber tablet Commonly known as:  SLOW FE Take 1 Tab by mouth daily. glipiZIDE SR 10 mg CR tablet Commonly known as:  GLUCOTROL XL  
TAKE 1 TABLET BY MOUTH TWICE DAILY  
  
 insulin glargine 100 unit/mL (3 mL) Inpn Commonly known as:  LANTUS SOLOSTAR  
38 Units by SubCUTAneous route daily. Insulin Needles (Disposable) 32 gauge x 5/32\" Ndle Commonly known as:  Rosalva Pen Needle USE AS DIRECTED ONCE EVERY DAY  
  
 meloxicam 15 mg tablet Commonly known as:  MOBIC  
TAKE 1 TABLET BY MOUTH DAILY pantoprazole 40 mg tablet Commonly known as:  PROTONIX Take 1 Tab by mouth daily. PARoxetine 40 mg tablet Commonly known as:  PAXIL Take 1 Tab by mouth daily. QUEtiapine 50 mg tablet Commonly known as:  SEROquel TAKE 1 TABLET BY MOUTH EVERY EVENING AS NEEDED  
  
 simvastatin 10 mg tablet Commonly known as:  ZOCOR Take 1 Tab by mouth nightly. VITAMIN D3 1,000 unit tablet Generic drug:  cholecalciferol Take  by mouth daily. Prescriptions Sent to Pharmacy Refills  
 simvastatin (ZOCOR) 10 mg tablet 1 Sig: Take 1 Tab by mouth nightly. Class: Normal  
 Pharmacy: [facility] [address] [place] [address], [address] [place] [address]  AT [address] Ph #: [phone]  Route: Oral  
 amLODIPine (NORVASC) 10 mg tablet 1  
 Sig: Take 1 Tab by mouth daily. Class: Normal  
 Pharmacy: [facility] [address] [place] [address], [address] [place] [address] DR AT [address] Ph #: [phone] Route: Oral  
  
We Performed the Following AMB POC HEMOGLOBIN A1C [50297 CPT(R)] To-Do List   
 12/04/2017 Imaging:  VANCE MAMMO BI SCREENING INCL CAD Patient Instructions Learning About Diabetes Food Guidelines Your Care Instructions Meal planning is important to manage diabetes. It helps keep your blood sugar at a target level (which you set with your doctor). You don't have to eat special foods. You can eat what your family eats, including sweets once in a while. But you do have to pay attention to how often you eat and how much you eat of certain foods. You may want to work with a dietitian or a certified diabetes educator (CDE) to help you plan meals and snacks. A dietitian or CDE can also help you lose weight if that is one of your goals. What should you know about eating carbs? Managing the amount of carbohydrate (carbs) you eat is an important part of healthy meals when you have diabetes. Carbohydrate is found in many foods. · Learn which foods have carbs. And learn the amounts of carbs in different foods. ¨ Bread, cereal, pasta, and rice have about 15 grams of carbs in a serving. A serving is 1 slice of bread (1 ounce), ½ cup of cooked cereal, or 1/3 cup of cooked pasta or rice. ¨ Fruits have 15 grams of carbs in a serving. A serving is 1 small fresh fruit, such as an apple or orange; ½ of a banana; ½ cup of cooked or canned fruit; ½ cup of fruit juice; 1 cup of melon or raspberries; or 2 tablespoons of dried fruit. ¨ Milk and no-sugar-added yogurt have 15 grams of carbs in a serving. A serving is 1 cup of milk or 2/3 cup of no-sugar-added yogurt. ¨ Starchy vegetables have 15 grams of carbs in a serving.  A serving is ½ cup of mashed potatoes or sweet potato; 1 cup winter squash; ½ of a small baked potato; ½ cup of cooked beans; or ½ cup cooked corn or green peas. · Learn how much carbs to eat each day and at each meal. A dietitian or CDE can teach you how to keep track of the amount of carbs you eat. This is called carbohydrate counting. · If you are not sure how to count carbohydrate grams, use the Plate Method to plan meals. It is a good, quick way to make sure that you have a balanced meal. It also helps you spread carbs throughout the day. ¨ Divide your plate by types of foods. Put non-starchy vegetables on half the plate, meat or other protein food on one-quarter of the plate, and a grain or starchy vegetable in the final quarter of the plate. To this you can add a small piece of fruit and 1 cup of milk or yogurt, depending on how many carbs you are supposed to eat at a meal. 
· Try to eat about the same amount of carbs at each meal. Do not \"save up\" your daily allowance of carbs to eat at one meal. 
· Proteins have very little or no carbs per serving. Examples of proteins are beef, chicken, turkey, fish, eggs, tofu, cheese, cottage cheese, and peanut butter. A serving size of meat is 3 ounces, which is about the size of a deck of cards. Examples of meat substitute serving sizes (equal to 1 ounce of meat) are 1/4 cup of cottage cheese, 1 egg, 1 tablespoon of peanut butter, and ½ cup of tofu. How can you eat out and still eat healthy? · Learn to estimate the serving sizes of foods that have carbohydrate. If you measure food at home, it will be easier to estimate the amount in a serving of restaurant food. · If the meal you order has too much carbohydrate (such as potatoes, corn, or baked beans), ask to have a low-carbohydrate food instead. Ask for a salad or green vegetables. · If you use insulin, check your blood sugar before and after eating out to help you plan how much to eat in the future. · If you eat more carbohydrate at a meal than you had planned, take a walk or do other exercise. This will help lower your blood sugar. What else should you know? · Limit saturated fat, such as the fat from meat and dairy products. This is a healthy choice because people who have diabetes are at higher risk of heart disease. So choose lean cuts of meat and nonfat or low-fat dairy products. Use olive or canola oil instead of butter or shortening when cooking. · Don't skip meals. Your blood sugar may drop too low if you skip meals and take insulin or certain medicines for diabetes. · Check with your doctor before you drink alcohol. Alcohol can cause your blood sugar to drop too low. Alcohol can also cause a bad reaction if you take certain diabetes medicines. Follow-up care is a key part of your treatment and safety. Be sure to make and go to all appointments, and call your doctor if you are having problems. It's also a good idea to know your test results and keep a list of the medicines you take. Where can you learn more? Go to http://daina-penelope.info/. Enter Z162 in the search box to learn more about \"Learning About Diabetes Food Guidelines. \" Current as of: March 13, 2017 Content Version: 11.4 © 5527-5109 Healthwise, Incorporated. Care instructions adapted under license by [employer] (which disclaims liability or warranty for this information). If you have questions about a medical condition or this instruction, always ask your healthcare professional. [place] [address] any warranty or liability for your use of this information. Introducing [facility]! [employer] introduces [employer] patient portal. Now you can access parts of your medical record, email your doctor's office, and request medication refills online. 1. In your internet browser, go to [email or web address] 2. Click on the First Time User? Click Here link in the Sign In box. You will see the New Member Sign Up page. 3. Enter your Brndstr Access Code exactly as it appears below. You will not need to use this code after youve completed the sign-up process. If you do not sign up before the expiration date, you must request a new code. · Brndstr Access Code: [email or web address] Expires: 3/4/2018  2:13 PM 
 
4. Enter the last four digits of your Social Security Number (xxxx) and Date of Birth (mm/dd/yyyy) as indicated and click Submit. You will be taken to the next sign-up page. 5. Create a Brndstr ID. This will be your Brndstr login ID and cannot be changed, so think of one that is secure and easy to remember. 6. Create a Brndstr password. You can change your password at any time. 7. Enter your Password Reset Question and Answer. This can be used at a later time if you forget your password. 8. Enter your e-mail address. You will receive e-mail notification when new information is available in [address] 9. Click Sign Up. You can now view and download portions of your medical record. 10. Click the Download Summary menu link to download a portable copy of your medical information. If you have questions, please visit the Frequently Asked Questions section of the Brndstr website. Remember, Brndstr is NOT to be used for urgent needs. For medical emergencies, dial 911. Now available from your iPhone and Android! Please provide this summary of care documentation to your next provider. Your primary care clinician is listed as [address]. If you have any questions after today's visit, please call [phone].

## 2018-01-12 DIAGNOSIS — Z12.39 SCREENING FOR MALIGNANT NEOPLASM OF BREAST: ICD-10-CM

## 2018-02-16 DIAGNOSIS — Z12.39 BREAST CANCER SCREENING: ICD-10-CM

## 2018-03-05 ENCOUNTER — OFFICE VISIT (OUTPATIENT)
Dept: FAMILY MEDICINE CLINIC | Age: 63
End: 2018-03-05

## 2018-03-05 VITALS
HEART RATE: 97 BPM | TEMPERATURE: 98 F | BODY MASS INDEX: 37.81 KG/M2 | RESPIRATION RATE: 20 BRPM | DIASTOLIC BLOOD PRESSURE: 84 MMHG | SYSTOLIC BLOOD PRESSURE: 138 MMHG | OXYGEN SATURATION: 97 % | HEIGHT: 63 IN | WEIGHT: 213.4 LBS

## 2018-03-05 DIAGNOSIS — R20.2 PARESTHESIA OF LEFT ARM: ICD-10-CM

## 2018-03-05 DIAGNOSIS — R09.81 NASAL CONGESTION: Primary | ICD-10-CM

## 2018-03-05 DIAGNOSIS — G89.29 CHRONIC BILATERAL LOW BACK PAIN WITHOUT SCIATICA: ICD-10-CM

## 2018-03-05 DIAGNOSIS — M25.562 CHRONIC PAIN OF LEFT KNEE: ICD-10-CM

## 2018-03-05 DIAGNOSIS — E11.9 DIABETES MELLITUS WITHOUT COMPLICATION (HCC): ICD-10-CM

## 2018-03-05 DIAGNOSIS — M54.50 CHRONIC BILATERAL LOW BACK PAIN WITHOUT SCIATICA: ICD-10-CM

## 2018-03-05 DIAGNOSIS — G89.29 CHRONIC PAIN OF LEFT KNEE: ICD-10-CM

## 2018-03-05 DIAGNOSIS — N20.0 NEPHROLITHIASIS: ICD-10-CM

## 2018-03-05 DIAGNOSIS — E66.9 OBESITY (BMI 30-39.9): ICD-10-CM

## 2018-03-05 PROBLEM — E11.21 TYPE 2 DIABETES WITH NEPHROPATHY (HCC): Status: ACTIVE | Noted: 2018-03-05

## 2018-03-05 LAB — HBA1C MFR BLD HPLC: 8.9 %

## 2018-03-05 RX ORDER — INSULIN GLARGINE 100 [IU]/ML
38 INJECTION, SOLUTION SUBCUTANEOUS DAILY
Qty: 30 ML | Refills: 3 | Status: SHIPPED | OUTPATIENT
Start: 2018-03-05 | End: 2018-04-05 | Stop reason: SDUPTHER

## 2018-03-05 RX ORDER — MELOXICAM 15 MG/1
TABLET ORAL
Qty: 90 TAB | Refills: 1 | Status: SHIPPED | OUTPATIENT
Start: 2018-03-05 | End: 2018-04-05 | Stop reason: SDUPTHER

## 2018-03-05 RX ORDER — BUPROPION HYDROCHLORIDE 150 MG/1
150 TABLET, EXTENDED RELEASE ORAL DAILY
Refills: 0 | COMMUNITY
Start: 2018-02-26 | End: 2018-09-11

## 2018-03-05 RX ORDER — GLIPIZIDE 10 MG/1
TABLET, FILM COATED, EXTENDED RELEASE ORAL
Qty: 180 TAB | Refills: 1 | Status: SHIPPED | OUTPATIENT
Start: 2018-03-05 | End: 2018-04-09 | Stop reason: SDUPTHER

## 2018-03-05 RX ORDER — FLUTICASONE PROPIONATE 50 MCG
2 SPRAY, SUSPENSION (ML) NASAL DAILY
Qty: 3 BOTTLE | Refills: 1 | Status: SHIPPED | OUTPATIENT
Start: 2018-03-05 | End: 2018-06-01

## 2018-03-05 RX ORDER — PANTOPRAZOLE SODIUM 40 MG/1
40 TABLET, DELAYED RELEASE ORAL DAILY
Qty: 90 TAB | Refills: 1 | Status: SHIPPED | OUTPATIENT
Start: 2018-03-05 | End: 2018-04-05 | Stop reason: SDUPTHER

## 2018-03-05 NOTE — MR AVS SNAPSHOT
[address] [place] [address] [address] 7251 [facility] [id number] [phone] Patient: Yahir Linares MRN: [id number] IIV:[id number] Visit Information Date & Time Provider Department Dept. Phone Encounter #  
 3/5/2018  1:00 PM Toan Kaufman, [employer] [phone] [id number] Upcoming Health Maintenance Date Due Hepatitis C Screening 1955 PAP AKA CERVICAL CYTOLOGY 3/18/2017 FOOT EXAM Q1 1/13/2018 EYE EXAM RETINAL OR DILATED Q1 5/1/2018 HEMOGLOBIN A1C Q6M 6/4/2018 MICROALBUMIN Q1 6/15/2018 LIPID PANEL Q1 6/15/2018 FOBT Q 1 YEAR AGE 50-75 6/15/2018 BREAST CANCER SCRN MAMMOGRAM 7/29/2018 GLAUCOMA SCREENING Q2Y 6/7/2019 DTaP/Tdap/Td series (2 - Td) 1/13/2027 Allergies as of 3/5/2018  Review Complete On: 3/5/2018 By: Nicolasa Morris LPN Severity Noted Reaction Type Reactions Amaryl [Glimepiride]  03/22/2012    Nausea Only Penicillins  10/25/2010    Nausea and Vomiting Current Immunizations  Reviewed on 6/17/2016 Name Date Pneumococcal Polysaccharide (PPSV-23) 1/13/2017  9:02 AM  
  
 Not reviewed this visit You Were Diagnosed With   
  
 Codes Comments Nasal congestion    -  Primary ICD-10-CM: R09.81 ICD-9-CM: 478.19 Diabetes mellitus without complication ([facility]     TPJ-42-ND: E11.9 ICD-9-CM: 250.00 Chronic pain of left knee     ICD-10-CM: M25.562, G89.29 ICD-9-CM: 719.46, 338.29 Paresthesia of left arm     ICD-10-CM: R20.2 ICD-9-CM: 055. 0 Chronic bilateral low back pain without sciatica     ICD-10-CM: M54.5, G89.29 ICD-9-CM: 724.2, 338.29 Vitals BP Pulse Temp Resp Height(growth percentile) Weight(growth percentile) 138/84 (BP 1 Location: Left arm, BP Patient Position: Sitting) 97 98 °F (36.7 °C) (Oral) 20 5' 3\" (1.6 m) 213 lb 6.4 oz (96.8 kg) SpO2 BMI OB Status Smoking Status 97% 37.8 kg/m2 Postmenopausal Current Every Day Smoker BMI and BSA Data Body Mass Index Body Surface Area  
 37.8 kg/m 2 2.07 m 2 Preferred Pharmacy Pharmacy Name Phone [address], [address] [facility] AT [address] [facility] [address] [phone] Your Updated Medication List  
  
   
This list is accurate as of 3/5/18  1:44 PM.  Always use your most recent med list.  
  
  
  
  
 alcohol swabs Padm Commonly known as:  ALCOHOL PADS Dispense 1 box 200 count. Dx 250.00. Twice daily use  
  
 amLODIPine 10 mg tablet Commonly known as:  Do Glatter Take 1 Tab by mouth daily. Blood-Glucose Meter monitoring kit Commonly known as:  CONTOUR METER  
DX E11.9 buPROPion  mg SR tablet Commonly known as:  Della Corns Take 150 mg by mouth daily. Ferrous Sulfate 47.5 mg iron Tber tablet Commonly known as:  SLOW FE Take 1 Tab by mouth daily. fluticasone 50 mcg/actuation nasal spray Commonly known as:  Ion Remedies 2 Sprays by Both Nostrils route daily. glipiZIDE SR 10 mg CR tablet Commonly known as:  GLUCOTROL XL  
TAKE 1 TABLET BY MOUTH TWICE DAILY  
  
 insulin glargine 100 unit/mL (3 mL) Inpn Commonly known as:  LANTUS SOLOSTAR U-100 INSULIN  
38 Units by SubCUTAneous route daily. Insulin Needles (Disposable) 32 gauge x 5/32\" Ndle Commonly known as:  Rosalva Pen Needle USE AS DIRECTED ONCE EVERY DAY  
  
 meloxicam 15 mg tablet Commonly known as:  MOBIC  
TAKE 1 TABLET BY MOUTH DAILY pantoprazole 40 mg tablet Commonly known as:  PROTONIX Take 1 Tab by mouth daily. PARoxetine 40 mg tablet Commonly known as:  PAXIL Take 1 Tab by mouth daily. QUEtiapine 50 mg tablet Commonly known as:  SEROquel TAKE 1 TABLET BY MOUTH EVERY EVENING AS NEEDED  
  
 simvastatin 10 mg tablet Commonly known as:  ZOCOR Take 1 Tab by mouth nightly. VITAMIN D3 1,000 unit tablet Generic drug:  cholecalciferol Take  by mouth daily. Prescriptions Sent to Pharmacy Refills  
 fluticasone (FLONASE) 50 mcg/actuation nasal spray 1 Si Sprays by Both Nostrils route daily. Class: Normal  
 Pharmacy: [facility] [address], [address] [facility] [address] AT [address] Ph #: [phone] Route: Both Nostrils  
 insulin glargine (LANTUS SOLOSTAR U-100 INSULIN) 100 unit/mL (3 mL) inpn 3 Si Units by SubCUTAneous route daily. Class: Normal  
 Pharmacy: [facility] [address], [address] [facility] [address] AT [address] Ph #: [phone] Route: SubCUTAneous Ferrous Sulfate (SLOW FE) 47.5 mg iron TbER tablet 1 Sig: Take 1 Tab by mouth daily. Class: Normal  
 Pharmacy: [facility] [address], [address] [facility] [address] AT [address] Ph #: [phone] Route: Oral  
 pantoprazole (PROTONIX) 40 mg tablet 1 Sig: Take 1 Tab by mouth daily. Class: Normal  
 Pharmacy: [facility] [address], [address] [facility] [address] AT [address] Ph #: [phone] Route: Oral  
 meloxicam (MOBIC) 15 mg tablet 1 Sig: TAKE 1 TABLET BY MOUTH DAILY Class: Normal  
 Pharmacy: [facility] [address] [place] [address] DR AT [address] Ph #: [phone]  
 glipiZIDE SR (GLUCOTROL XL) 10 mg CR tablet 1 Sig: TAKE 1 TABLET BY MOUTH TWICE DAILY Class: Normal  
 Pharmacy: [facility] [address], [address] [facility] [address] AT [address] Ph #: [phone] We Performed the Following AMB POC HEMOGLOBIN A1C [65459 CPT(R)] To-Do List   
 2018 Imaging:  XR KNEE LT 3 V   
  
 2018 Imaging:  XR SPINE CERV PA LAT ODONT 3 V MAX   
  
 2018 Imaging:  XR SPINE LUMB 2 OR 3 V Patient Instructions Learning About Diabetes Food Guidelines Your Care Instructions Meal planning is important to manage diabetes. It helps keep your blood sugar at a target level (which you set with your doctor). You don't have to eat special foods. You can eat what your family eats, including sweets once in a while. But you do have to pay attention to how often you eat and how much you eat of certain foods. You may want to work with a dietitian or a certified diabetes educator (CDE) to help you plan meals and snacks. A dietitian or CDE can also help you lose weight if that is one of your goals. What should you know about eating carbs? Managing the amount of carbohydrate (carbs) you eat is an important part of healthy meals when you have diabetes. Carbohydrate is found in many foods. · Learn which foods have carbs. And learn the amounts of carbs in different foods. ¨ Bread, cereal, pasta, and rice have about 15 grams of carbs in a serving. A serving is 1 slice of bread (1 ounce), ½ cup of cooked cereal, or 1/3 cup of cooked pasta or rice. ¨ Fruits have 15 grams of carbs in a serving. A serving is 1 small fresh fruit, such as an apple or orange; ½ of a banana; ½ cup of cooked or canned fruit; ½ cup of fruit juice; 1 cup of melon or raspberries; or 2 tablespoons of dried fruit. ¨ Milk and no-sugar-added yogurt have 15 grams of carbs in a serving. A serving is 1 cup of milk or 2/3 cup of no-sugar-added yogurt. ¨ Starchy vegetables have 15 grams of carbs in a serving. A serving is ½ cup of mashed potatoes or sweet potato; 1 cup winter squash; ½ of a small baked potato; ½ cup of cooked beans; or ½ cup cooked corn or green peas. · Learn how much carbs to eat each day and at each meal. A dietitian or CDE can teach you how to keep track of the amount of carbs you eat. This is called carbohydrate counting. · If you are not sure how to count carbohydrate grams, use the Plate Method to plan meals. It is a good, quick way to make sure that you have a balanced meal. It also helps you spread carbs throughout the day. ¨ Divide your plate by types of foods. Put non-starchy vegetables on half the plate, meat or other protein food on one-quarter of the plate, and a grain or starchy vegetable in the final quarter of the plate. To this you can add a small piece of fruit and 1 cup of milk or yogurt, depending on how many carbs you are supposed to eat at a meal. 
· Try to eat about the same amount of carbs at each meal. Do not \"save up\" your daily allowance of carbs to eat at one meal. 
· Proteins have very little or no carbs per serving. Examples of proteins are beef, chicken, turkey, fish, eggs, tofu, cheese, cottage cheese, and peanut butter. A serving size of meat is 3 ounces, which is about the size of a deck of cards. Examples of meat substitute serving sizes (equal to 1 ounce of meat) are 1/4 cup of cottage cheese, 1 egg, 1 tablespoon of peanut butter, and ½ cup of tofu. How can you eat out and still eat healthy? · Learn to estimate the serving sizes of foods that have carbohydrate. If you measure food at home, it will be easier to estimate the amount in a serving of restaurant food. · If the meal you order has too much carbohydrate (such as potatoes, corn, or baked beans), ask to have a low-carbohydrate food instead. Ask for a salad or green vegetables. · If you use insulin, check your blood sugar before and after eating out to help you plan how much to eat in the future. · If you eat more carbohydrate at a meal than you had planned, take a walk or do other exercise. This will help lower your blood sugar. What else should you know? · Limit saturated fat, such as the fat from meat and dairy products.  This is a healthy choice because people who have diabetes are at higher risk of heart disease. So choose lean cuts of meat and nonfat or low-fat dairy products. Use olive or canola oil instead of butter or shortening when cooking. · Don't skip meals. Your blood sugar may drop too low if you skip meals and take insulin or certain medicines for diabetes. · Check with your doctor before you drink alcohol. Alcohol can cause your blood sugar to drop too low. Alcohol can also cause a bad reaction if you take certain diabetes medicines. Follow-up care is a key part of your treatment and safety. Be sure to make and go to all appointments, and call your doctor if you are having problems. It's also a good idea to know your test results and keep a list of the medicines you take. Where can you learn more? Go to http://daina-penelope.info/. Enter O709 in the search box to learn more about \"Learning About Diabetes Food Guidelines. \" Current as of: March 13, 2017 Content Version: 11.4 © 7506-1993 [employer]. Care instructions adapted under license by [employer] (which disclaims liability or warranty for this information). If you have questions about a medical condition or this instruction, always ask your healthcare professional. [place] [address] any warranty or liability for your use of this information. Introducing [facility]! [employer] Dum[employer] introduces Sqord patient portal. Now you can access parts of your medical record, email your doctor's office, and request medication refills online. 1. In your internet browser, go to [email or web address] 2. Click on the First Time User? Click Here link in the Sign In box. You will see the New Member Sign Up page. 3. Enter your Sqord Access Code exactly as it appears below. You will not need to use this code after youve completed the sign-up process. If you do not sign up before the expiration date, you must request a new code. · "[employer]" Access Code: [email or web address] Expires: 6/3/2018  1:43 PM 
 
4. Enter the last four digits of your Social Security Number (xxxx) and Date of Birth (mm/dd/yyyy) as indicated and click Submit. You will be taken to the next sign-up page. 5. Create a "[employer]" ID. This will be your "[employer]" login ID and cannot be changed, so think of one that is secure and easy to remember. 6. Create a "[employer]" password. You can change your password at any time. 7. Enter your Password Reset Question and Answer. This can be used at a later time if you forget your password. 8. Enter your e-mail address. You will receive e-mail notification when new information is available in [address] 9. Click Sign Up. You can now view and download portions of your medical record. 10. Click the Download Summary menu link to download a portable copy of your medical information. If you have questions, please visit the Frequently Asked Questions section of the "[employer]" website. Remember, "[employer]" is NOT to be used for urgent needs. For medical emergencies, dial 911. Now available from your iPhone and Android! Please provide this summary of care documentation to your next provider. Your primary care clinician is listed as [address]. If you have any questions after today's visit, please call [phone].

## 2018-03-05 NOTE — PROGRESS NOTES
Assessment/Plan:    *Diagnoses and all orders for this visit:    1. Nasal congestion  -     fluticasone (FLONASE) 50 mcg/actuation nasal spray; 2 Sprays by Both Nostrils route daily. 2. Diabetes mellitus without complication (HCC)  -     AMB POC HEMOGLOBIN A1C  -     insulin glargine (LANTUS SOLOSTAR U-100 INSULIN) 100 unit/mL (3 mL) inpn; 38 Units by SubCUTAneous route daily.  -     glipiZIDE SR (GLUCOTROL XL) 10 mg CR tablet; TAKE 1 TABLET BY MOUTH TWICE DAILY    3. Chronic pain of left knee    4. Paresthesia of left arm  -     XR SPINE CERV PA LAT ODONT 3 V MAX; Future    5. Chronic bilateral low back pain without sciatica  -     XR SPINE LUMB 2 OR 3 V; Future    6. Obesity (BMI 30-39. 9)    Other orders  -     Ferrous Sulfate (SLOW FE) 47.5 mg iron TbER tablet; Take 1 Tab by mouth daily. -     pantoprazole (PROTONIX) 40 mg tablet; Take 1 Tab by mouth daily. -     meloxicam (MOBIC) 15 mg tablet; TAKE 1 TABLET BY MOUTH DAILY      Declined seeing Nutritionist at this time. Will discuss again in 2 months. will continue seeing her therapist and psychiatrist.  On next visit, will discuss how her memory has been doing and whether she has seen any improvement. If not, will refer to neurology. Will await results of xrays. No adjustments to meds at this time. The plan was discussed with the patient. The patient verbalized understanding and is in agreement with the plan. All medication potential side effects were discussed with the patient.    -------------------------------------------------------------------------------------------------------------------        Anthony Bender is a 66 y.o. female and presents with Diabetes         Subjective:  Pt presents today with a counselor / Araseli Brice. Pt has started seeing a new psychiatrist and will start seeing a therapist as well. She has a hx of depression and schizophrenia. Has been placed on Wellbutrin while continuing her Paxil and Seroquel.     DM: her A1c has risen to 8.9%. She has just started to get a better handle on her diet, wants time to work on this. Has been having numbness and tingling in the LT arm x few weeks, occurs randomly. Has also had a clicking of the LT thumb. Having chronic low back pain. Has been having on going pain in the LT knee. No evaluation has been done. Nasal congestion particularly affecting the RT nostril. Obesity: needs to be more active and watch diet. ROS:  Constitutional: No recent weight change. No weakness/fatigue. No f/c. Skin: No rashes, change in nails/hair, itching   HENT: No HA, dizziness. No hearing loss/tinnitus. No nasal congestion/discharge. Eyes: No change in vision, double/blurred vision or eye pain/redness. Cardiovascular: No CP/palpitations. No CORADO/orthopnea/PND. Respiratory: No cough/sputum, dyspnea, wheezing. Gastointestinal: No dysphagia, reflux. No n/v. No constipation/diarrhea. No melena/rectal bleeding. Genitourinary: No dysuria, urinary hesitancy, nocturia, hematuria. No incontinence. Musculoskeletal: No joint pain/stiffness. No muscle pain/tenderness. Endo: No heat/cold intolerance, no polyuria/polydypsia. Heme: No h/o anemia. No easy bleeding/bruising. Allergy/Immunology: No seasonal rhinitis. Denies frequent colds, sinus/ear infections. Neurological: No seizures/numbness/weakness. No paresthesias. Psychiatric:  No depression, anxiety. The problem list was updated as a part of today's visit.   Patient Active Problem List   Diagnosis Code    HTN (hypertension) I10    Insomnia G47.00    GERD (gastroesophageal reflux disease) K21.9    Schizophrenia ([address] F20.9    DM (diabetes mellitus) ([address] E11.9    Hyperlipidemia E78.5    Diabetic retinopathy ([facility] E11.319    Cataract H26.9    Glaucoma H40.9    Sleep apnea, obstructive G47.33    Diabetic neuropathy (HCC) E11.40    Heel spur M77.30    Depression F32.9    Type 2 diabetes with nephropathy ([address] E11.21       The PSH, FH were reviewed. SH:  Social History   Substance Use Topics    Smoking status: Current Every Day Smoker     Packs/day: 0.50     Years: 21.00     Types: Cigarettes    Smokeless tobacco: Current User    Alcohol use No       Medications/Allergies:  Current Outpatient Prescriptions on File Prior to Visit   Medication Sig Dispense Refill    simvastatin (ZOCOR) 10 mg tablet Take 1 Tab by mouth nightly. 90 Tab 1    amLODIPine (NORVASC) 10 mg tablet Take 1 Tab by mouth daily. 90 Tab 1    QUEtiapine (SEROQUEL) 50 mg tablet TAKE 1 TABLET BY MOUTH EVERY EVENING AS NEEDED (Patient taking differently: 100 mg. TAKE 1 TABLET BY MOUTH EVERY EVENING AS NEEDED) 90 Tab 1    PARoxetine (PAXIL) 40 mg tablet Take 1 Tab by mouth daily. 90 Tab 1    Insulin Needles, Disposable, (KENDALL PEN NEEDLE) 32 gauge x 5/32\" ndle USE AS DIRECTED ONCE EVERY  Pen Needle 1    Blood-Glucose Meter (CONTOUR METER) monitoring kit DX E11.9 1 Kit 0    alcohol swabs (ALCOHOL PADS) padm Dispense 1 box 200 count. Dx 250.00. Twice daily use 200 Pad 2    cholecalciferol, vitamin d3, (VITAMIN D) 1,000 unit tablet Take  by mouth daily. No current facility-administered medications on file prior to visit.          Allergies   Allergen Reactions    Amaryl [Glimepiride] Nausea Only    Penicillins Nausea and Vomiting         Health Maintenance:   Health Maintenance   Topic Date Due    Hepatitis C Screening  1955    PAP AKA CERVICAL CYTOLOGY  03/18/2017    FOOT EXAM Q1  01/13/2018    EYE EXAM RETINAL OR DILATED Q1  05/01/2018    HEMOGLOBIN A1C Q6M  06/04/2018    MICROALBUMIN Q1  06/15/2018    LIPID PANEL Q1  06/15/2018    FOBT Q 1 YEAR AGE 50-75  06/15/2018    BREAST CANCER SCRN MAMMOGRAM  07/29/2018    GLAUCOMA SCREENING Q2Y  06/07/2019    DTaP/Tdap/Td series (2 - Td) 01/13/2027    ZOSTER VACCINE AGE 60>  Addressed    Pneumococcal 19-64 Medium Risk  Completed    Influenza Age 5 to Adult  Addressed       Objective:  Visit Vitals    /84 (BP 1 Location: Left arm, BP Patient Position: Sitting)    Pulse 97    Temp 98 °F (36.7 °C) (Oral)    Resp 20    Ht 5' 3\" (1.6 m)    Wt 213 lb 6.4 oz (96.8 kg)    SpO2 97%    BMI 37.8 kg/m2          Nurses notes and VS reviewed. Physical Examination: General appearance - alert, well appearing, and in no distress  Chest - clear to auscultation, no wheezes, rales or rhonchi, symmetric air entry  Heart - normal rate, regular rhythm, normal S1, S2, no murmurs, rubs, clicks or gallops  Musculoskeletal - abnormal exam of left thumb with clicking;          Labwork and Ancillary Studies:    CBC w/Diff  Lab Results   Component Value Date/Time    WBC 12.2 06/15/2017 11:08 AM    HGB 11.3 (L) 06/15/2017 11:08 AM    PLATELET 404 10/89/1513 11:08 AM         Basic Metabolic Profile  Lab Results   Component Value Date/Time    Sodium 140 06/22/2017 02:46 PM    Potassium 3.8 06/22/2017 02:46 PM    Chloride 104 06/22/2017 02:46 PM    CO2 26 06/22/2017 02:46 PM    Anion gap 10 06/22/2017 02:46 PM    Glucose 235 (H) 06/22/2017 02:46 PM    BUN 15 06/22/2017 02:46 PM    Creatinine 1.09 06/22/2017 02:46 PM    BUN/Creatinine ratio 14 06/22/2017 02:46 PM    GFR est AA >60 06/22/2017 02:46 PM    GFR est non-AA 51 (L) 06/22/2017 02:46 PM    Calcium 8.8 06/22/2017 02:46 PM         LFT  Lab Results   Component Value Date/Time    ALT (SGPT) 64 (H) 06/15/2017 11:08 AM    AST (SGOT) 55 (H) 06/15/2017 11:08 AM    Alk.  phosphatase 75 06/15/2017 11:08 AM    Bilirubin, direct 0.1 06/15/2017 11:08 AM    Bilirubin, total 0.2 06/15/2017 11:08 AM         Cholesterol  Lab Results   Component Value Date/Time    Cholesterol, total 129 06/15/2017 11:08 AM    HDL Cholesterol 43 06/15/2017 11:08 AM    LDL, calculated 39.4 06/15/2017 11:08 AM    Triglyceride 233 (H) 06/15/2017 11:08 AM    CHOL/HDL Ratio 3.0 06/15/2017 11:08 AM

## 2018-03-05 NOTE — PROGRESS NOTES
William Gaspar is a 66 y.o. female is here for follow up on diabetes. 1. Have you been to the ER, urgent care clinic since your last visit? Hospitalized since your last visit? [place] for stomach virus     2. Have you seen or consulted any other health care providers outside of the [address] [place] [address] since your last visit? Include any pap smears or colon screening.  therapist      Health Maintenance Due   Topic Date Due    Hepatitis C Screening  1955    PAP AKA CERVICAL CYTOLOGY  03/18/2017    FOOT EXAM Q1  01/13/2018

## 2018-03-05 NOTE — PATIENT INSTRUCTIONS

## 2018-03-12 ENCOUNTER — TELEPHONE (OUTPATIENT)
Dept: FAMILY MEDICINE CLINIC | Age: 63
End: 2018-03-12

## 2018-03-12 NOTE — TELEPHONE ENCOUNTER
Please call [facility] at [phone] so patient can stay on lantus.

## 2018-03-14 NOTE — TELEPHONE ENCOUNTER
Spoke with pharmacy script was paid for with a 3.70 copay for patient .  I called patient she was told by [facility] that they don't want to cover her lantus any longer she hasn't received a letter with this information will get back to us once she has more details

## 2018-03-23 ENCOUNTER — TELEPHONE (OUTPATIENT)
Dept: FAMILY MEDICINE CLINIC | Age: 63
End: 2018-03-23

## 2018-03-23 NOTE — TELEPHONE ENCOUNTER
[facility] is needing to have clarification on the order for the CT that was ordered.      Protocol for Kidney Stones is to have the CT without contrast.     Please assist with a verbal.     [facility]   [phone]

## 2018-03-23 NOTE — TELEPHONE ENCOUNTER
Spoke with eze discussed with Dr Harsh Saldivar okay to do without contrast eze  voiced understanding

## 2018-03-30 ENCOUNTER — TELEPHONE (OUTPATIENT)
Dept: FAMILY MEDICINE CLINIC | Age: 63
End: 2018-03-30

## 2018-03-30 NOTE — TELEPHONE ENCOUNTER
Please notify pt that she has 2 stones in th eLT kidney but they are not obstructing. If she is not having any pain in that area, we can just watch it. Otherwise, I can refer her to Urology.

## 2018-03-30 NOTE — TELEPHONE ENCOUNTER
Spoke with patient, verified with 2 identifiers. Advised of below. Patient states that she is not having pain in the area at this time. If anything changes she will give us a call back for referral. No further concerns at this time.

## 2018-04-05 DIAGNOSIS — E11.9 DIABETES MELLITUS WITHOUT COMPLICATION (HCC): ICD-10-CM

## 2018-04-05 DIAGNOSIS — E78.00 PURE HYPERCHOLESTEROLEMIA: ICD-10-CM

## 2018-04-05 RX ORDER — INSULIN GLARGINE 100 [IU]/ML
38 INJECTION, SOLUTION SUBCUTANEOUS DAILY
Qty: 30 ML | Refills: 0 | Status: SHIPPED | OUTPATIENT
Start: 2018-04-05 | End: 2018-04-19 | Stop reason: CLARIF

## 2018-04-05 RX ORDER — QUETIAPINE FUMARATE 50 MG/1
TABLET, FILM COATED ORAL
Qty: 90 TAB | Refills: 0 | Status: SHIPPED | OUTPATIENT
Start: 2018-04-05 | End: 2018-04-06 | Stop reason: SDUPTHER

## 2018-04-05 RX ORDER — PAROXETINE HYDROCHLORIDE 40 MG/1
40 TABLET, FILM COATED ORAL DAILY
Qty: 90 TAB | Refills: 0 | Status: SHIPPED | OUTPATIENT
Start: 2018-04-05 | End: 2018-04-06 | Stop reason: SDUPTHER

## 2018-04-05 RX ORDER — PANTOPRAZOLE SODIUM 40 MG/1
40 TABLET, DELAYED RELEASE ORAL DAILY
Qty: 90 TAB | Refills: 0 | Status: SHIPPED | OUTPATIENT
Start: 2018-04-05 | End: 2018-09-10 | Stop reason: SDUPTHER

## 2018-04-05 RX ORDER — MELOXICAM 15 MG/1
TABLET ORAL
Qty: 90 TAB | Refills: 0 | Status: SHIPPED | OUTPATIENT
Start: 2018-04-05 | End: 2018-06-01

## 2018-04-05 RX ORDER — SIMVASTATIN 10 MG/1
10 TABLET, FILM COATED ORAL
Qty: 90 TAB | Refills: 0 | Status: SHIPPED | OUTPATIENT
Start: 2018-04-05 | End: 2018-04-13 | Stop reason: SDUPTHER

## 2018-04-05 NOTE — TELEPHONE ENCOUNTER
Patient pharmacy is requesting a med refill of the following:    Meloxicam 15 mg 3/5/18 ([facility])  Simvastatin 10 mg 12/4/17 ([facility])  [facility]/4/17 [facility]  Quetiapine 50 mg 6/22/17 [facility]  Lantus 38 unit sq daily 3/5/18 ([facility])  Paroxetine 40 mg 6/22/17 ([facility])  Pantoprazole 40 mg 3/5/18 ([facility])  Glipizide XL 10 mg 3/5/18 ([facility])    Last visit: 3/5/18

## 2018-04-06 DIAGNOSIS — E11.9 DIABETES MELLITUS WITHOUT COMPLICATION (HCC): ICD-10-CM

## 2018-04-06 DIAGNOSIS — I10 ESSENTIAL HYPERTENSION: ICD-10-CM

## 2018-04-06 NOTE — TELEPHONE ENCOUNTER
Pt called to request medication, the medication is not on file for: Fimvastatin 10 mg.     The medication listed is not the correct MG that the pt read off for refill:    Amlodipinevesylate 10MG    Paroxetine 30MG    Quetiatine 100MG

## 2018-04-09 ENCOUNTER — TELEPHONE (OUTPATIENT)
Dept: FAMILY MEDICINE CLINIC | Age: 63
End: 2018-04-09

## 2018-04-10 ENCOUNTER — TELEPHONE (OUTPATIENT)
Dept: FAMILY MEDICINE CLINIC | Age: 63
End: 2018-04-10

## 2018-04-10 ENCOUNTER — HOSPITAL ENCOUNTER (OUTPATIENT)
Dept: PHYSICAL THERAPY | Age: 63
End: 2018-04-10

## 2018-04-10 RX ORDER — QUETIAPINE FUMARATE 50 MG/1
TABLET, FILM COATED ORAL
Qty: 90 TAB | Refills: 1 | Status: SHIPPED | OUTPATIENT
Start: 2018-04-10 | End: 2018-12-13 | Stop reason: SDUPTHER

## 2018-04-10 RX ORDER — GLIPIZIDE 10 MG/1
TABLET, FILM COATED, EXTENDED RELEASE ORAL
Qty: 180 TAB | Refills: 1 | Status: SHIPPED | OUTPATIENT
Start: 2018-04-10 | End: 2019-01-16 | Stop reason: SDUPTHER

## 2018-04-10 RX ORDER — PAROXETINE HYDROCHLORIDE 40 MG/1
40 TABLET, FILM COATED ORAL DAILY
Qty: 90 TAB | Refills: 1 | Status: SHIPPED | OUTPATIENT
Start: 2018-04-10 | End: 2018-06-01

## 2018-04-10 RX ORDER — AMLODIPINE BESYLATE 10 MG/1
10 TABLET ORAL DAILY
Qty: 90 TAB | Refills: 1 | Status: SHIPPED | OUTPATIENT
Start: 2018-04-10 | End: 2018-04-13 | Stop reason: SDUPTHER

## 2018-04-10 NOTE — TELEPHONE ENCOUNTER
Deann Maynard from [facility] called to inform us on the pt. She states that the pt's insurance will only cover high complexity evals otherwise it would cost her $468 OOP. So it is limiting what she is able to do with the pt. She states that the patient is going to call her ins and give them a call back.

## 2018-04-13 DIAGNOSIS — E78.00 PURE HYPERCHOLESTEROLEMIA: ICD-10-CM

## 2018-04-13 DIAGNOSIS — I10 ESSENTIAL HYPERTENSION: ICD-10-CM

## 2018-04-13 RX ORDER — AMLODIPINE BESYLATE 10 MG/1
10 TABLET ORAL DAILY
Qty: 90 TAB | Refills: 1 | Status: SHIPPED | OUTPATIENT
Start: 2018-04-13 | End: 2018-08-29 | Stop reason: SDUPTHER

## 2018-04-13 RX ORDER — SIMVASTATIN 10 MG/1
10 TABLET, FILM COATED ORAL
Qty: 90 TAB | Refills: 1 | Status: SHIPPED | OUTPATIENT
Start: 2018-04-13 | End: 2018-09-30 | Stop reason: SDUPTHER

## 2018-04-13 NOTE — TELEPHONE ENCOUNTER
Patient called requesting scripts be sent to [facility] last refilled to mail order on 4/5/ and 4/10 last seen 3/5/18

## 2018-04-18 NOTE — TELEPHONE ENCOUNTER
Patient pharmacy is requesting a med alternative medicine (insurance not covering lantus-alternative is levemer)    Last visit: 3/5/18    Last jignesh 4/5/18

## 2018-05-02 DIAGNOSIS — N20.0 NEPHROLITHIASIS: ICD-10-CM

## 2018-05-07 ENCOUNTER — OFFICE VISIT (OUTPATIENT)
Dept: FAMILY MEDICINE CLINIC | Age: 63
End: 2018-05-07

## 2018-05-07 ENCOUNTER — HOSPITAL ENCOUNTER (OUTPATIENT)
Dept: LAB | Age: 63
Discharge: HOME | End: 2018-05-07
Payer: MEDICARE

## 2018-05-07 VITALS
RESPIRATION RATE: 16 BRPM | DIASTOLIC BLOOD PRESSURE: 80 MMHG | BODY MASS INDEX: 37.39 KG/M2 | HEART RATE: 87 BPM | SYSTOLIC BLOOD PRESSURE: 124 MMHG | OXYGEN SATURATION: 99 % | HEIGHT: 63 IN | TEMPERATURE: 98.2 F | WEIGHT: 211 LBS

## 2018-05-07 DIAGNOSIS — E55.9 HYPOVITAMINOSIS D: ICD-10-CM

## 2018-05-07 DIAGNOSIS — E11.42 TYPE 2 DIABETES MELLITUS WITH DIABETIC POLYNEUROPATHY, WITH LONG-TERM CURRENT USE OF INSULIN (HCC): ICD-10-CM

## 2018-05-07 DIAGNOSIS — Z79.4 TYPE 2 DIABETES MELLITUS WITH DIABETIC POLYNEUROPATHY, WITH LONG-TERM CURRENT USE OF INSULIN (HCC): Primary | ICD-10-CM

## 2018-05-07 DIAGNOSIS — F32.A DEPRESSION, UNSPECIFIED DEPRESSION TYPE: ICD-10-CM

## 2018-05-07 DIAGNOSIS — F20.9 SCHIZOPHRENIA, UNSPECIFIED TYPE (HCC): ICD-10-CM

## 2018-05-07 DIAGNOSIS — I10 ESSENTIAL HYPERTENSION: ICD-10-CM

## 2018-05-07 DIAGNOSIS — Z79.4 TYPE 2 DIABETES MELLITUS WITH DIABETIC POLYNEUROPATHY, WITH LONG-TERM CURRENT USE OF INSULIN (HCC): ICD-10-CM

## 2018-05-07 DIAGNOSIS — E78.00 PURE HYPERCHOLESTEROLEMIA: ICD-10-CM

## 2018-05-07 DIAGNOSIS — R41.3 MEMORY LOSS: ICD-10-CM

## 2018-05-07 DIAGNOSIS — E11.42 TYPE 2 DIABETES MELLITUS WITH DIABETIC POLYNEUROPATHY, WITH LONG-TERM CURRENT USE OF INSULIN (HCC): Primary | ICD-10-CM

## 2018-05-07 PROBLEM — E66.01 SEVERE OBESITY (BMI 35.0-39.9) WITH COMORBIDITY (HCC): Status: ACTIVE | Noted: 2018-05-07

## 2018-05-07 LAB
ALBUMIN SERPL-MCNC: 4.1 G/DL (ref 3.4–5)
ALBUMIN/GLOB SERPL: 1.1 {RATIO} (ref 0.8–1.7)
ALP SERPL-CCNC: 119 U/L (ref 45–117)
ALT SERPL-CCNC: 34 U/L (ref 13–56)
ANION GAP SERPL CALC-SCNC: 8 MMOL/L (ref 3–18)
APPEARANCE UR: CLEAR
AST SERPL-CCNC: 19 U/L (ref 15–37)
BASOPHILS # BLD: 0.1 K/UL (ref 0–0.06)
BASOPHILS NFR BLD: 0 % (ref 0–2)
BILIRUB DIRECT SERPL-MCNC: <0.1 MG/DL (ref 0–0.2)
BILIRUB SERPL-MCNC: 0.2 MG/DL (ref 0.2–1)
BILIRUB UR QL: NEGATIVE
BUN SERPL-MCNC: 14 MG/DL (ref 7–18)
BUN/CREAT SERPL: 12 (ref 12–20)
CALCIUM SERPL-MCNC: 9.3 MG/DL (ref 8.5–10.1)
CHLORIDE SERPL-SCNC: 103 MMOL/L (ref 100–108)
CHOLEST SERPL-MCNC: 147 MG/DL
CO2 SERPL-SCNC: 28 MMOL/L (ref 21–32)
COLOR UR: YELLOW
CREAT SERPL-MCNC: 1.21 MG/DL (ref 0.6–1.3)
DIFFERENTIAL METHOD BLD: ABNORMAL
EOSINOPHIL # BLD: 0.2 K/UL (ref 0–0.4)
EOSINOPHIL NFR BLD: 2 % (ref 0–5)
ERYTHROCYTE [DISTWIDTH] IN BLOOD BY AUTOMATED COUNT: 12.7 % (ref 11.6–14.5)
GLOBULIN SER CALC-MCNC: 3.7 G/DL (ref 2–4)
GLUCOSE SERPL-MCNC: 216 MG/DL (ref 74–99)
GLUCOSE UR STRIP.AUTO-MCNC: 250 MG/DL
HBA1C MFR BLD: 9.2 % (ref 4.2–5.6)
HCT VFR BLD AUTO: 39.5 % (ref 35–45)
HDLC SERPL-MCNC: 50 MG/DL (ref 40–60)
HDLC SERPL: 2.9 {RATIO} (ref 0–5)
HGB BLD-MCNC: 13 G/DL (ref 12–16)
HGB UR QL STRIP: NEGATIVE
KETONES UR QL STRIP.AUTO: NEGATIVE MG/DL
LDLC SERPL CALC-MCNC: 63.8 MG/DL (ref 0–100)
LEUKOCYTE ESTERASE UR QL STRIP.AUTO: NEGATIVE
LIPID PROFILE,FLP: ABNORMAL
LYMPHOCYTES # BLD: 4 K/UL (ref 0.9–3.6)
LYMPHOCYTES NFR BLD: 36 % (ref 21–52)
MCH RBC QN AUTO: 30.4 PG (ref 24–34)
MCHC RBC AUTO-ENTMCNC: 32.9 G/DL (ref 31–37)
MCV RBC AUTO: 92.3 FL (ref 74–97)
MONOCYTES # BLD: 0.7 K/UL (ref 0.05–1.2)
MONOCYTES NFR BLD: 7 % (ref 3–10)
NEUTS SEG # BLD: 6.2 K/UL (ref 1.8–8)
NEUTS SEG NFR BLD: 55 % (ref 40–73)
NITRITE UR QL STRIP.AUTO: NEGATIVE
PH UR STRIP: 6.5 [PH] (ref 5–8)
PLATELET # BLD AUTO: 393 K/UL (ref 135–420)
PMV BLD AUTO: 10.2 FL (ref 9.2–11.8)
POTASSIUM SERPL-SCNC: 4.4 MMOL/L (ref 3.5–5.5)
PROT SERPL-MCNC: 7.8 G/DL (ref 6.4–8.2)
PROT UR STRIP-MCNC: NEGATIVE MG/DL
RBC # BLD AUTO: 4.28 M/UL (ref 4.2–5.3)
SODIUM SERPL-SCNC: 139 MMOL/L (ref 136–145)
SP GR UR REFRACTOMETRY: 1.02 (ref 1–1.03)
T4 FREE SERPL-MCNC: 0.9 NG/DL (ref 0.7–1.5)
TRIGL SERPL-MCNC: 166 MG/DL (ref ?–150)
TSH SERPL DL<=0.05 MIU/L-ACNC: 1.53 UIU/ML (ref 0.36–3.74)
UROBILINOGEN UR QL STRIP.AUTO: 1 EU/DL (ref 0.2–1)
VLDLC SERPL CALC-MCNC: 33.2 MG/DL
WBC # BLD AUTO: 11.2 K/UL (ref 4.6–13.2)

## 2018-05-07 PROCEDURE — 80061 LIPID PANEL: CPT | Performed by: INTERNAL MEDICINE

## 2018-05-07 PROCEDURE — 83036 HEMOGLOBIN GLYCOSYLATED A1C: CPT | Performed by: INTERNAL MEDICINE

## 2018-05-07 PROCEDURE — 84439 ASSAY OF FREE THYROXINE: CPT | Performed by: INTERNAL MEDICINE

## 2018-05-07 PROCEDURE — 81003 URINALYSIS AUTO W/O SCOPE: CPT | Performed by: INTERNAL MEDICINE

## 2018-05-07 PROCEDURE — 85025 COMPLETE CBC W/AUTO DIFF WBC: CPT | Performed by: INTERNAL MEDICINE

## 2018-05-07 PROCEDURE — 80048 BASIC METABOLIC PNL TOTAL CA: CPT | Performed by: INTERNAL MEDICINE

## 2018-05-07 PROCEDURE — 82306 VITAMIN D 25 HYDROXY: CPT | Performed by: INTERNAL MEDICINE

## 2018-05-07 PROCEDURE — 82043 UR ALBUMIN QUANTITATIVE: CPT | Performed by: INTERNAL MEDICINE

## 2018-05-07 PROCEDURE — 84443 ASSAY THYROID STIM HORMONE: CPT | Performed by: INTERNAL MEDICINE

## 2018-05-07 PROCEDURE — 36415 COLL VENOUS BLD VENIPUNCTURE: CPT | Performed by: INTERNAL MEDICINE

## 2018-05-07 PROCEDURE — 80076 HEPATIC FUNCTION PANEL: CPT | Performed by: INTERNAL MEDICINE

## 2018-05-07 RX ORDER — LAMOTRIGINE 25 MG/1
TABLET ORAL DAILY
COMMUNITY
End: 2018-09-27

## 2018-05-07 NOTE — PATIENT INSTRUCTIONS

## 2018-05-07 NOTE — PROGRESS NOTES
Khurram Tejada is a 66 y.o. female (: 1955) presenting to address:    Chief Complaint   Patient presents with    Diabetes    Vaginal Itching       Vitals:    18 1301   BP: 124/80   Pulse: 87   Resp: 16   Temp: 98.2 °F (36.8 °C)   TempSrc: Oral   SpO2: 99%   Weight: 211 lb (95.7 kg)   Height: 5' 3\" (1.6 m)   PainSc:  10 - Worst pain ever   PainLoc: Hand       Hearing/Vision:   No exam data present    Learning Assessment:     Learning Assessment 3/10/2014   PRIMARY LEARNER Patient   PRIMARY LANGUAGE ENGLISH   LEARNER PREFERENCE PRIMARY OTHER (COMMENT)   ANSWERED BY patient   RELATIONSHIP SELF     Depression Screening:     PHQ over the last two weeks 2017   PHQ Not Done -   Little interest or pleasure in doing things Not at all   Feeling down, depressed or hopeless Not at all   Total Score PHQ 2 0     Fall Risk Assessment:     Fall Risk Assessment, last 12 mths 2018   Able to walk? Yes   Fall in past 12 months? No     Abuse Screening:     Abuse Screening Questionnaire 2018   Do you ever feel afraid of your partner? N   Are you in a relationship with someone who physically or mentally threatens you? N   Is it safe for you to go home? Y     Coordination of Care Questionaire:   1. Have you been to the ER, urgent care clinic since your last visit? Hospitalized since your last visit? YES Er visit for stomach virus last month ? 2. Have you seen or consulted any other health care providers outside of the [address] [place] [address] since your last visit? Include any pap smears or colon screening. YES psych     Advanced Directive:   1. Do you have an Advanced Directive? NO    2. Would you like information on Advanced Directives?  NO

## 2018-05-07 NOTE — MR AVS SNAPSHOT
[address] 2201 [facility] [id number] [phone] Patient: Shilpi Mcneil MRN: [id number] OQA:[id number] Visit Information Date & Time Provider Department Dept. Phone Encounter #  
 5/7/2018  1:00 PM Courtney Lima MD [address] [place] [address] [phone] [id number]  
  
 6/1/2018  2:30 PM  
Any with Kendall Aparicio MD  
[facility] [place] [address] [address] 2201 [facility] [id number] [phone]  
  
   
 [place] [id number] Upcoming Health Maintenance Date Due Hepatitis C Screening 1955 PAP AKA CERVICAL CYTOLOGY 3/18/2017 FOOT EXAM Q1 1/13/2018 EYE EXAM RETINAL OR DILATED Q1 5/1/2018 FOBT Q 1 YEAR AGE 50-75 6/15/2018 BREAST CANCER SCRN MAMMOGRAM 7/29/2018 MEDICARE YEARLY EXAM 6/15/2018 MICROALBUMIN Q1 6/15/2018 LIPID PANEL Q1 6/15/2018 Influenza Age 5 to Adult 8/1/2018 HEMOGLOBIN A1C Q6M 9/5/2018 GLAUCOMA SCREENING Q2Y 6/7/2019 DTaP/Tdap/Td series (2 - Td) 1/13/2027 Allergies as of 5/7/2018  Review Complete On: 5/7/2018 By: Courtney Lima MD  
  
 Severity Noted Reaction Type Reactions Amaryl [Glimepiride]  03/22/2012    Nausea Only Penicillins  10/25/2010    Nausea and Vomiting Current Immunizations  Reviewed on 6/17/2016 Name Date Pneumococcal Polysaccharide (PPSV-23) 1/13/2017  9:02 AM  
  
 Not reviewed this visit You Were Diagnosed With   
  
 Codes Comments Type 2 diabetes mellitus with diabetic polyneuropathy, with long-term current use of insulin (HCC)    -  Primary ICD-10-CM: E11.42, Z79.4 ICD-9-CM: 250.60, 357.2, V58.67 Essential hypertension     ICD-10-CM: I10 
ICD-9-CM: 401.9 Pure hypercholesterolemia     ICD-10-CM: E78.00 ICD-9-CM: 272.0 Vitals BP Pulse Temp Resp Height(growth percentile) Weight(growth percentile) 124/80 (BP 1 Location: Left arm, BP Patient Position: Sitting) 87 98.2 °F (36.8 °C) (Oral) 16 5' 3\" (1.6 m) 211 lb (95.7 kg) SpO2 BMI OB Status Smoking Status 99% 37.38 kg/m2 Postmenopausal Current Every Day Smoker Vitals History BMI and BSA Data Body Mass Index Body Surface Area  
 37.38 kg/m 2 2.06 m 2 Preferred Pharmacy Pharmacy Name Phone [address] [facility], [address] [facility] [facility] [address] [phone] Your Updated Medication List  
  
   
This list is accurate as of 5/7/18  1:35 PM.  Always use your most recent med list. ALCOHOL PREP PADS Padm Generic drug:  alcohol swabs USE TWICE DAILY AS DIRECTED  
  
 amLODIPine 10 mg tablet Commonly known as:  Maranda Grebe Take 1 Tab by mouth daily. Blood-Glucose Meter monitoring kit Commonly known as:  CONTOUR METER  
DX E11.9 buPROPion  mg SR tablet Commonly known as:  Jaylon Nova Take 150 mg by mouth daily. Ferrous Sulfate 47.5 mg iron Tber tablet Commonly known as:  SLOW FE Take 1 Tab by mouth daily. fluticasone 50 mcg/actuation nasal spray Commonly known as:  Arlys Pock 2 Sprays by Both Nostrils route daily. glipiZIDE SR 10 mg CR tablet Commonly known as:  GLUCOTROL XL  
TAKE 1 TABLET BY MOUTH TWICE DAILY  
  
 insulin detemir U-100 100 unit/mL (3 mL) Inpn Commonly known as:  LEVEMIR FLEXTOUCH  
38 Units by SubCUTAneous route daily. * Insulin Needles (Disposable) 32 gauge x 5/32\" Ndle Commonly known as:  Rosalva Pen Needle USE AS DIRECTED ONCE EVERY DAY  
  
 * Rosalva Pen Needle 32 gauge x 5/32\" Ndle Generic drug:  Insulin Needles (Disposable) USE ONCE EVERY DAY AS DIRECTED  
  
 lamoTRIgine 25 mg tablet Commonly known as: LaMICtal  
Take  by mouth daily. meloxicam 15 mg tablet Commonly known as:  MOBIC  
TAKE 1 TABLET BY MOUTH DAILY pantoprazole 40 mg tablet Commonly known as:  PROTONIX Take 1 Tab by mouth daily. PARoxetine 40 mg tablet Commonly known as:  PAXIL Take 1 Tab by mouth daily. QUEtiapine 50 mg tablet Commonly known as:  SEROquel TAKE 1 TABLET BY MOUTH EVERY EVENING AS NEEDED  
  
 simvastatin 10 mg tablet Commonly known as:  ZOCOR Take 1 Tab by mouth nightly. VITAMIN D3 1,000 unit tablet Generic drug:  cholecalciferol Take  by mouth daily. * Notice: This list has 2 medication(s) that are the same as other medications prescribed for you. Read the directions carefully, and ask your doctor or other care provider to review them with you. We Performed the Following  DIABETES FOOT EXAM [7 Custom] Patient Instructions Learning About Diabetes Food Guidelines Your Care Instructions Meal planning is important to manage diabetes. It helps keep your blood sugar at a target level (which you set with your doctor). You don't have to eat special foods. You can eat what your family eats, including sweets once in a while. But you do have to pay attention to how often you eat and how much you eat of certain foods. You may want to work with a dietitian or a certified diabetes educator (CDE) to help you plan meals and snacks. A dietitian or CDE can also help you lose weight if that is one of your goals. What should you know about eating carbs? Managing the amount of carbohydrate (carbs) you eat is an important part of healthy meals when you have diabetes. Carbohydrate is found in many foods. · Learn which foods have carbs. And learn the amounts of carbs in different foods. ¨ Bread, cereal, pasta, and rice have about 15 grams of carbs in a serving. A serving is 1 slice of bread (1 ounce), ½ cup of cooked cereal, or 1/3 cup of cooked pasta or rice. ¨ Fruits have 15 grams of carbs in a serving.  A serving is 1 small fresh fruit, such as an apple or orange; ½ of a banana; ½ cup of cooked or canned fruit; ½ cup of fruit juice; 1 cup of melon or raspberries; or 2 tablespoons of dried fruit. ¨ Milk and no-sugar-added yogurt have 15 grams of carbs in a serving. A serving is 1 cup of milk or 2/3 cup of no-sugar-added yogurt. ¨ Starchy vegetables have 15 grams of carbs in a serving. A serving is ½ cup of mashed potatoes or sweet potato; 1 cup winter squash; ½ of a small baked potato; ½ cup of cooked beans; or ½ cup cooked corn or green peas. · Learn how much carbs to eat each day and at each meal. A dietitian or CDE can teach you how to keep track of the amount of carbs you eat. This is called carbohydrate counting. · If you are not sure how to count carbohydrate grams, use the Plate Method to plan meals. It is a good, quick way to make sure that you have a balanced meal. It also helps you spread carbs throughout the day. ¨ Divide your plate by types of foods. Put non-starchy vegetables on half the plate, meat or other protein food on one-quarter of the plate, and a grain or starchy vegetable in the final quarter of the plate. To this you can add a small piece of fruit and 1 cup of milk or yogurt, depending on how many carbs you are supposed to eat at a meal. 
· Try to eat about the same amount of carbs at each meal. Do not \"save up\" your daily allowance of carbs to eat at one meal. 
· Proteins have very little or no carbs per serving. Examples of proteins are beef, chicken, turkey, fish, eggs, tofu, cheese, cottage cheese, and peanut butter. A serving size of meat is 3 ounces, which is about the size of a deck of cards. Examples of meat substitute serving sizes (equal to 1 ounce of meat) are 1/4 cup of cottage cheese, 1 egg, 1 tablespoon of peanut butter, and ½ cup of tofu. How can you eat out and still eat healthy? · Learn to estimate the serving sizes of foods that have carbohydrate. If you measure food at home, it will be easier to estimate the amount in a serving of restaurant food. · If the meal you order has too much carbohydrate (such as potatoes, corn, or baked beans), ask to have a low-carbohydrate food instead. Ask for a salad or green vegetables. · If you use insulin, check your blood sugar before and after eating out to help you plan how much to eat in the future. · If you eat more carbohydrate at a meal than you had planned, take a walk or do other exercise. This will help lower your blood sugar. What else should you know? · Limit saturated fat, such as the fat from meat and dairy products. This is a healthy choice because people who have diabetes are at higher risk of heart disease. So choose lean cuts of meat and nonfat or low-fat dairy products. Use olive or canola oil instead of butter or shortening when cooking. · Don't skip meals. Your blood sugar may drop too low if you skip meals and take insulin or certain medicines for diabetes. · Check with your doctor before you drink alcohol. Alcohol can cause your blood sugar to drop too low. Alcohol can also cause a bad reaction if you take certain diabetes medicines. Follow-up care is a key part of your treatment and safety. Be sure to make and go to all appointments, and call your doctor if you are having problems. It's also a good idea to know your test results and keep a list of the medicines you take. Where can you learn more? Go to http://daina-penelope.info/. Enter M694 in the search box to learn more about \"Learning About Diabetes Food Guidelines. \" Current as of: March 13, 2017 Content Version: 11.4 © 7552-2297 Healthwise, Incorporated. Care instructions adapted under license by [employer] (which disclaims liability or warranty for this information). If you have questions about a medical condition or this instruction, always ask your healthcare professional. [place] [address] any warranty or liability for your use of this information. Introducing [facility]! Turner Handley introduces [employer] patient portal. Now you can access parts of your medical record, email your doctor's office, and request medication refills online. 1. In your internet browser, go to [email or web address] 2. Click on the First Time User? Click Here link in the Sign In box. You will see the New Member Sign Up page. 3. Enter your [employer] Access Code exactly as it appears below. You will not need to use this code after youve completed the sign-up process. If you do not sign up before the expiration date, you must request a new code. · [employer] Access Code: [email or web address] Expires: 6/3/2018  2:43 PM 
 
4. Enter the last four digits of your Social Security Number (xxxx) and Date of Birth (mm/dd/yyyy) as indicated and click Submit. You will be taken to the next sign-up page. 5. Create a [employer] ID. This will be your [employer] login ID and cannot be changed, so think of one that is secure and easy to remember. 6. Create a [employer] password. You can change your password at any time. 7. Enter your Password Reset Question and Answer. This can be used at a later time if you forget your password. 8. Enter your e-mail address. You will receive e-mail notification when new information is available in [address] 9. Click Sign Up. You can now view and download portions of your medical record. 10. Click the Download Summary menu link to download a portable copy of your medical information. If you have questions, please visit the Frequently Asked Questions section of the [employer] website. Remember, [employer] is NOT to be used for urgent needs. For medical emergencies, dial 911. Now available from your iPhone and Android! Please provide this summary of care documentation to your next provider. Your primary care clinician is listed as [phone]. If you have any questions after today's visit, please call [phone].

## 2018-05-07 NOTE — PROGRESS NOTES
Assessment/Plan:    *Diagnoses and all orders for this visit:    1. Type 2 diabetes mellitus with diabetic polyneuropathy, with long-term current use of insulin ([facility])  -      DIABETES FOOT EXAM  -     CBC WITH AUTOMATED DIFF; Future  -     HEPATIC FUNCTION PANEL; Future  -     LIPID PANEL; Future  -     METABOLIC PANEL, BASIC; Future  -     TSH 3RD GENERATION; Future  -     T4, FREE; Future  -     URINALYSIS W/ RFLX MICROSCOPIC; Future  -     HEMOGLOBIN A1C W/O EAG; Future  -     MICROALBUMIN, UR, RAND W/ MICROALB/CREAT RATIO; Future    2. Essential hypertension    3. Pure hypercholesterolemia    4. Hypovitaminosis D  -     VITAMIN D, 25 HYDROXY; Future    5. Depression, unspecified depression type    6. Schizophrenia, unspecified type ([facility]        [address] in 2 months. Labs prior. The plan was discussed with the patient. The patient verbalized understanding and is in agreement with the plan. All medication potential side effects were discussed with the patient.    -------------------------------------------------------------------------------------------------------------------        Jose De Jesus Martinez is a 66 y.o. female and presents with Diabetes and Vaginal Itching         Subjective:  Pt here for f/u.    DM; was checked in early March, too soon at present. Had risen from the time prior. She is trying to do better, has a  and other individuals that are provided by the city to help her. Her A1c needs to be better. Depression / Schizophrenia: Pt sees psychiatry, are monitoring her due to her previous suicidal ideations. HTN: well controlled. HLD: stable. ROS:  Constitutional: No recent weight change. No weakness/fatigue. No f/c. Skin: No rashes, change in nails/hair, itching   HENT: No HA, dizziness. No hearing loss/tinnitus. No nasal congestion/discharge. Eyes: No change in vision, double/blurred vision or eye pain/redness. Cardiovascular: No CP/palpitations.   No CORADO/orthopnea/PND. Respiratory: No cough/sputum, dyspnea, wheezing. Gastointestinal: No dysphagia, reflux. No n/v. No constipation/diarrhea. No melena/rectal bleeding. Genitourinary: No dysuria, urinary hesitancy, nocturia, hematuria. No incontinence. Musculoskeletal: No joint pain/stiffness. No muscle pain/tenderness. Endo: No heat/cold intolerance, no polyuria/polydypsia. Heme: No h/o anemia. No easy bleeding/bruising. Allergy/Immunology: No seasonal rhinitis. Denies frequent colds, sinus/ear infections. Neurological: No seizures/numbness/weakness. No paresthesias. Psychiatric:  No depression, anxiety. The problem list was updated as a part of today's visit. Patient Active Problem List   Diagnosis Code    HTN (hypertension) I10    Insomnia G47.00    GERD (gastroesophageal reflux disease) K21.9    Schizophrenia ([facility] F20.9    DM (diabetes mellitus) ([facility] E11.9    Hyperlipidemia E78.5    Diabetic retinopathy ([facility] E11.319    Cataract H26.9    Glaucoma H40.9    Sleep apnea, obstructive G47.33    Diabetic neuropathy (HCC) E11.40    Heel spur M77.30    Depression F32.9    Type 2 diabetes with nephropathy (HCC) E11.21    Severe obesity (BMI 35.0-39. 9) with comorbidity ([facility] E66.01       The PSH, FH were reviewed. SH:  Social History   Substance Use Topics    Smoking status: Current Every Day Smoker     Packs/day: 0.50     Years: 21.00     Types: Cigarettes    Smokeless tobacco: Current User    Alcohol use No       Medications/Allergies:  Current Outpatient Prescriptions on File Prior to Visit   Medication Sig Dispense Refill    KENDALL PEN NEEDLE 32 gauge x 5/32\" ndle USE ONCE EVERY DAY AS DIRECTED 100 Pen Needle 1    ALCOHOL PREP PADS padm USE TWICE DAILY AS DIRECTED 200 Pad 1    insulin detemir U-100 (LEVEMIR FLEXTOUCH) 100 unit/mL (3 mL) inpn 38 Units by SubCUTAneous route daily. 30 mL 3    amLODIPine (NORVASC) 10 mg tablet Take 1 Tab by mouth daily.  90 Tab 1  simvastatin (ZOCOR) 10 mg tablet Take 1 Tab by mouth nightly. 90 Tab 1    QUEtiapine (SEROQUEL) 50 mg tablet TAKE 1 TABLET BY MOUTH EVERY EVENING AS NEEDED (Patient taking differently: 100 mg. TAKE 1 TABLET BY MOUTH EVERY EVENING AS NEEDED) 90 Tab 1    glipiZIDE SR (GLUCOTROL XL) 10 mg CR tablet TAKE 1 TABLET BY MOUTH TWICE DAILY 180 Tab 1    pantoprazole (PROTONIX) 40 mg tablet Take 1 Tab by mouth daily. 90 Tab 0    meloxicam (MOBIC) 15 mg tablet TAKE 1 TABLET BY MOUTH DAILY 90 Tab 0    buPROPion SR (WELLBUTRIN SR) 150 mg SR tablet Take 150 mg by mouth daily. 0    fluticasone (FLONASE) 50 mcg/actuation nasal spray 2 Sprays by Both Nostrils route daily. 3 Bottle 1    Ferrous Sulfate (SLOW FE) 47.5 mg iron TbER tablet Take 1 Tab by mouth daily. 90 Tab 1    Insulin Needles, Disposable, (KENDALL PEN NEEDLE) 32 gauge x 5/32\" ndle USE AS DIRECTED ONCE EVERY  Pen Needle 1    Blood-Glucose Meter (CONTOUR METER) monitoring kit DX E11.9 1 Kit 0    cholecalciferol, vitamin d3, (VITAMIN D) 1,000 unit tablet Take  by mouth daily.  PARoxetine (PAXIL) 40 mg tablet Take 1 Tab by mouth daily. 90 Tab 1     No current facility-administered medications on file prior to visit.          Allergies   Allergen Reactions    Amaryl [Glimepiride] Nausea Only    Penicillins Nausea and Vomiting         Health Maintenance:   Health Maintenance   Topic Date Due    Hepatitis C Screening  1955    PAP AKA CERVICAL CYTOLOGY  03/18/2017    FOOT EXAM Q1  01/13/2018    EYE EXAM RETINAL OR DILATED Q1  05/01/2018    FOBT Q 1 YEAR AGE 50-75  06/15/2018    BREAST CANCER SCRN MAMMOGRAM  07/29/2018    MEDICARE YEARLY EXAM  06/15/2018    MICROALBUMIN Q1  06/15/2018    LIPID PANEL Q1  06/15/2018    Influenza Age 5 to Adult  08/01/2018    HEMOGLOBIN A1C Q6M  09/05/2018    GLAUCOMA SCREENING Q2Y  06/07/2019    DTaP/Tdap/Td series (2 - Td) 01/13/2027    ZOSTER VACCINE AGE 60>  Addressed    Pneumococcal 19-64 Medium Risk  Completed       Objective:  Visit Vitals    /80 (BP 1 Location: Left arm, BP Patient Position: Sitting)    Pulse 87    Temp 98.2 °F (36.8 °C) (Oral)    Resp 16    Ht 5' 3\" (1.6 m)    Wt 211 lb (95.7 kg)    SpO2 99%    BMI 37.38 kg/m2          Nurses notes and VS reviewed. Physical Examination: General appearance - alert, well appearing, and in no distress  Chest - clear to auscultation, no wheezes, rales or rhonchi, symmetric air entry  Heart - normal rate, regular rhythm, normal S1, S2, no murmurs, rubs, clicks or gallops  Diabetic foot exam:     Left:    Filament test normal sensation with micro filament   Pulse DP: 2+ (normal)   Pulse PT: 2+ (normal)   Deformities: None  Right:    Filament test normal sensation with micro filament   Pulse DP: 2+ (normal)   Pulse PT: 2+ (normal)   Deformities: None          Labwork and Ancillary Studies:    CBC w/Diff  Lab Results   Component Value Date/Time    WBC 12.2 06/15/2017 11:08 AM    HGB 11.3 (L) 06/15/2017 11:08 AM    PLATELET 621 99/26/0162 11:08 AM         Basic Metabolic Profile  Lab Results   Component Value Date/Time    Sodium 140 06/22/2017 02:46 PM    Potassium 3.8 06/22/2017 02:46 PM    Chloride 104 06/22/2017 02:46 PM    CO2 26 06/22/2017 02:46 PM    Anion gap 10 06/22/2017 02:46 PM    Glucose 235 (H) 06/22/2017 02:46 PM    BUN 15 06/22/2017 02:46 PM    Creatinine 1.09 06/22/2017 02:46 PM    BUN/Creatinine ratio 14 06/22/2017 02:46 PM    GFR est AA >60 06/22/2017 02:46 PM    GFR est non-AA 51 (L) 06/22/2017 02:46 PM    Calcium 8.8 06/22/2017 02:46 PM         LFT  Lab Results   Component Value Date/Time    ALT (SGPT) 64 (H) 06/15/2017 11:08 AM    AST (SGOT) 55 (H) 06/15/2017 11:08 AM    Alk.  phosphatase 75 06/15/2017 11:08 AM    Bilirubin, direct 0.1 06/15/2017 11:08 AM    Bilirubin, total 0.2 06/15/2017 11:08 AM         Cholesterol  Lab Results   Component Value Date/Time    Cholesterol, total 129 06/15/2017 11:08 AM    HDL Cholesterol 43 06/15/2017 11:08 AM    LDL, calculated 39.4 06/15/2017 11:08 AM    Triglyceride 233 (H) 06/15/2017 11:08 AM    CHOL/HDL Ratio 3.0 06/15/2017 11:08 AM

## 2018-05-08 LAB
25(OH)D3 SERPL-MCNC: 34.8 NG/ML (ref 30–100)
CREAT UR-MCNC: 189.38 MG/DL (ref 30–125)
MICROALBUMIN UR-MCNC: 6.4 MG/DL (ref 0–3)
MICROALBUMIN/CREAT UR-RTO: 34 MG/G (ref 0–30)

## 2018-05-11 NOTE — PROGRESS NOTES
A1c is very high. Is she taking her meds regularly? ?  If yes, increase Levemir to 45 units daily. Rest of labs fine.

## 2018-05-14 ENCOUNTER — TELEPHONE (OUTPATIENT)
Dept: FAMILY MEDICINE CLINIC | Age: 63
End: 2018-05-14

## 2018-05-21 NOTE — PROGRESS NOTES
Patient notified of results  She voiced understanding and she  is taking medication regularly and will increase Levemir to 45 units

## 2018-06-14 RX ORDER — AMITRIPTYLINE HYDROCHLORIDE 100 MG/1
TABLET, FILM COATED ORAL
Qty: 90 TAB | Refills: 0 | Status: SHIPPED | OUTPATIENT
Start: 2018-06-14 | End: 2018-09-10 | Stop reason: SDUPTHER

## 2018-07-16 ENCOUNTER — OFFICE VISIT (OUTPATIENT)
Dept: FAMILY MEDICINE CLINIC | Age: 63
End: 2018-07-16

## 2018-07-16 VITALS
BODY MASS INDEX: 34.22 KG/M2 | HEART RATE: 99 BPM | SYSTOLIC BLOOD PRESSURE: 110 MMHG | WEIGHT: 205.4 LBS | TEMPERATURE: 98.3 F | OXYGEN SATURATION: 97 % | HEIGHT: 65 IN | DIASTOLIC BLOOD PRESSURE: 72 MMHG | RESPIRATION RATE: 17 BRPM

## 2018-07-16 DIAGNOSIS — N64.9 BREAST LESION: ICD-10-CM

## 2018-07-16 DIAGNOSIS — Z79.4 TYPE 2 DIABETES MELLITUS WITH DIABETIC POLYNEUROPATHY, WITH LONG-TERM CURRENT USE OF INSULIN (HCC): Primary | ICD-10-CM

## 2018-07-16 DIAGNOSIS — E11.42 TYPE 2 DIABETES MELLITUS WITH DIABETIC POLYNEUROPATHY, WITH LONG-TERM CURRENT USE OF INSULIN (HCC): Primary | ICD-10-CM

## 2018-07-16 DIAGNOSIS — E66.9 OBESITY (BMI 30-39.9): ICD-10-CM

## 2018-07-16 DIAGNOSIS — Z00.00 MEDICARE ANNUAL WELLNESS VISIT, SUBSEQUENT: ICD-10-CM

## 2018-07-16 DIAGNOSIS — Z12.11 COLON CANCER SCREENING: ICD-10-CM

## 2018-07-16 LAB — HBA1C MFR BLD HPLC: 11.7 %

## 2018-07-16 RX ORDER — INSULIN GLARGINE 100 [IU]/ML
INJECTION, SOLUTION SUBCUTANEOUS
Refills: 3 | COMMUNITY
Start: 2018-06-13 | End: 2018-07-16 | Stop reason: SDUPTHER

## 2018-07-16 RX ORDER — MELOXICAM 15 MG/1
TABLET ORAL
Refills: 0 | COMMUNITY
Start: 2018-06-13 | End: 2018-09-07 | Stop reason: SDUPTHER

## 2018-07-16 RX ORDER — INSULIN GLARGINE 100 [IU]/ML
INJECTION, SOLUTION SUBCUTANEOUS
Qty: 30 ML | Refills: 3 | Status: SHIPPED | OUTPATIENT
Start: 2018-07-16 | End: 2018-09-19 | Stop reason: SDUPTHER

## 2018-07-16 RX ORDER — FLUTICASONE PROPIONATE 50 MCG
SPRAY, SUSPENSION (ML) NASAL
Refills: 0 | COMMUNITY
Start: 2018-06-13 | End: 2019-01-16

## 2018-07-16 RX ORDER — INSULIN GLARGINE 100 [IU]/ML
INJECTION, SOLUTION SUBCUTANEOUS
COMMUNITY
End: 2018-07-16 | Stop reason: SDUPTHER

## 2018-07-16 NOTE — MR AVS SNAPSHOT
[address] [place] [address]  [address] [facility] [address] [phone] Patient: Jaiden Guadarrama MRN: [id number] SGF:[birth year] Visit Information Date & Time Provider Department Dept. Phone Encounter #  
 7/16/2018 10:30 AM Pebbles El, [address] [phone] [id number] Upcoming Health Maintenance Date Due Hepatitis C Screening 1955 PAP AKA CERVICAL CYTOLOGY 3/18/2017 MEDICARE YEARLY EXAM 6/15/2018 FOBT Q 1 YEAR AGE 50-75 6/15/2018 BREAST CANCER SCRN MAMMOGRAM 7/29/2018 Influenza Age 5 to Adult 8/1/2018 HEMOGLOBIN A1C Q6M 11/7/2018 EYE EXAM RETINAL OR DILATED Q1 3/21/2019 FOOT EXAM Q1 5/7/2019 MICROALBUMIN Q1 5/7/2019 LIPID PANEL Q1 5/7/2019 GLAUCOMA SCREENING Q2Y 3/21/2020 DTaP/Tdap/Td series (2 - Td) 1/13/2027 Allergies as of 7/16/2018  Review Complete On: 7/16/2018 By: Pebbles El MD  
  
 Severity Noted Reaction Type Reactions Amaryl [Glimepiride]  03/22/2012    Nausea Only Penicillins  10/25/2010    Nausea and Vomiting Current Immunizations  Reviewed on 6/17/2016 Name Date Pneumococcal Polysaccharide (PPSV-23) 1/13/2017  9:02 AM  
  
 Not reviewed this visit You Were Diagnosed With   
  
 Codes Comments Type 2 diabetes mellitus with diabetic polyneuropathy, with long-term current use of insulin (HCC)    -  Primary ICD-10-CM: E11.42, Z79.4 ICD-9-CM: 250.60, 357.2, V58.67 Colon cancer screening     ICD-10-CM: Z12.11 ICD-9-CM: V76.51 Breast lesion     ICD-10-CM: N64.9 ICD-9-CM: 611.9 Obesity (BMI 30-39. 9)     ICD-10-CM: E66.9 ICD-9-CM: 278.00 Medicare annual wellness visit, subsequent     ICD-10-CM: Z00.00 ICD-9-CM: V70.0 Vitals BP Pulse Temp Resp Height(growth percentile) Weight(growth percentile)  110/72 (BP 1 Location: Left arm, BP Patient Position: Sitting) 99 98.3 °F (36.8 °C) (Oral) 17 5' 5\" (1.651 m) 205 lb 6.4 oz (93.2 kg) SpO2 BMI OB Status Smoking Status 97% 34.18 kg/m2 Postmenopausal Current Every Day Smoker BMI and BSA Data Body Mass Index Body Surface Area  
 34.18 kg/m 2 2.07 m 2 Preferred Pharmacy Pharmacy Name Phone [id number] , [address] [facility] [address] [phone] Your Updated Medication List  
  
   
This list is accurate as of 7/16/18 11:26 AM.  Always use your most recent med list. ALCOHOL PREP PADS Padm Generic drug:  alcohol swabs USE TWICE DAILY AS DIRECTED  
  
 amitriptyline 100 mg tablet Commonly known as:  ELAVIL TAKE 1 TABLET BY MOUTH EVERY NIGHT. GENERIC FOR ELAVIL. amLODIPine 10 mg tablet Commonly known as:  Elyn Coffer Take 1 Tab by mouth daily. Blood-Glucose Meter monitoring kit Commonly known as:  CONTOUR METER  
DX E11.9 buPROPion  mg SR tablet Commonly known as:  Edison Ponds Take 150 mg by mouth daily. Ferrous Sulfate 47.5 mg iron Tber tablet Commonly known as:  SLOW FE Take 1 Tab by mouth daily. fluticasone 50 mcg/actuation nasal spray Commonly known as:  Chan Blizzard INHALE 2 SPRAYS IEN QD  
  
 glipiZIDE SR 10 mg CR tablet Commonly known as:  GLUCOTROL XL  
TAKE 1 TABLET BY MOUTH TWICE DAILY  
  
 insulin glargine 100 unit/mL (3 mL) Inpn Commonly known as:  LANTUS SOLOSTAR U-100 INSULIN INJECT 48 UNITS BENEATH THE SKIN QD  
  
 * Insulin Needles (Disposable) 32 gauge x 5/32\" Ndle Commonly known as:  Rosalva Pen Needle USE AS DIRECTED ONCE EVERY DAY  
  
 * Rosalva Pen Needle 32 gauge x 5/32\" Ndle Generic drug:  Insulin Needles (Disposable) USE ONCE EVERY DAY AS DIRECTED  
  
 lamoTRIgine 25 mg tablet Commonly known as: LaMICtal  
Take  by mouth daily. meloxicam 15 mg tablet Commonly known as:  MOBIC TK 1 T PO  QD  
  
 pantoprazole 40 mg tablet Commonly known as:  PROTONIX Take 1 Tab by mouth daily. QUEtiapine 50 mg tablet Commonly known as:  SEROquel TAKE 1 TABLET BY MOUTH EVERY EVENING AS NEEDED  
  
 simvastatin 10 mg tablet Commonly known as:  ZOCOR Take 1 Tab by mouth nightly. VITAMIN D3 1,000 unit tablet Generic drug:  cholecalciferol Take  by mouth daily. * Notice: This list has 2 medication(s) that are the same as other medications prescribed for you. Read the directions carefully, and ask your doctor or other care provider to review them with you. Prescriptions Sent to Pharmacy Refills  
 insulin glargine (LANTUS SOLOSTAR U-100 INSULIN) 100 unit/mL (3 mL) inpn 3 Sig: INJECT 48 UNITS BENEATH THE SKIN QD Class: Normal  
 Pharmacy: [facility] [id number] [address] [place] [address] [address] Ph #: [phone] We Performed the Following AMB POC HEMOGLOBIN A1C [29047 CPT(R)] REFERRAL TO COLON AND RECTAL SURGERY [REF17 Custom] To-Do List   
 07/16/2018 Imaging:  US BREAST RT LIMITED=<3 QUAD Referral Information Referral ID Referred By Referred To  
  
 [id number] Bruce WIGGINS MD   
   [facility] [place] [address] [facility] Phone: [phone] Fax: [phone] Visits Status Start Date End Date 1 New Request 7/16/18 7/16/19 If your referral has a status of pending review or denied, additional information will be sent to support the outcome of this decision. Patient Instructions Medicare Wellness Visit, Female The best way to live healthy is to have a lifestyle where you eat a well-balanced diet, exercise regularly, limit alcohol use, and quit all forms of tobacco/nicotine, if applicable. Regular preventive services are another way to keep healthy.  Preventive services (vaccines, screening tests, monitoring & exams) can help personalize your care plan, which helps you manage your own care. Screening tests can find health problems at the earliest stages, when they are easiest to treat. [address] follows the current, evidence-based guidelines published by the [facility] Eric Sahu ([facility]) when recommending preventive services for our patients. Because we follow these guidelines, sometimes recommendations change over time as research supports it. (For example, mammograms used to be recommended annually. Even though Medicare will still pay for an annual mammogram, the newer guidelines recommend a mammogram every two years for women of average risk.) Of course, you and your provider may decide to screen more often for some diseases, based on your risk and co-morbidities (chronic disease you are already diagnosed with). Preventive services for you include: - Medicare offers their members a free annual wellness visit, which is time for you and your primary care provider to discuss and plan for your preventive service needs. Take advantage of this benefit every year! 
 
-All people over age 72 should receive the recommended pneumonia vaccines. Current USPSTF guidelines recommend a series of two vaccines for the best pneumonia protection.  
 
-All adults should have a yearly flu vaccine and a tetanus vaccine every 10 years. All adults age 61 years should receive a shingles vaccine once in their lifetime.   
 
-A bone mass density test is recommended when a woman turns 65 to screen for osteoporosis. This test is only recommended once as a screening. Some providers will use this same test as a disease monitoring tool if you already have osteoporosis. -All adults age 38-68 years who are overweight should have a diabetes screening test once every three years. -Other screening tests & preventive services for persons with diabetes include: an eye exam to screen for diabetic retinopathy, a kidney function test, a foot exam, and stricter control over your cholesterol.  
 
-Cardiovascular screening for adults with routine risk involves an electrocardiogram (ECG) at intervals determined by the provider.  
 
-Colorectal cancer screenings should be done for adults age 54-65 years with normal risk. There are a number of acceptable methods of screening for this type of cancer. Each test has its own benefits and drawbacks. Discuss with your provider what is most appropriate for you during your annual wellness visit. The different tests include: colonoscopy (considered the best screening method), a fecal occult blood test, a fecal DNA test, and sigmoidoscopy. -Breast cancer screenings are recommended every other year for women of normal risk age 54-69 years.  
 
-Cervical cancer screenings for women over age 72 are only recommended with certain risk factors.  
 
-All adults born between [facility] should be screened once for Hepatitis C. Here is a list of your current Health Maintenance items (your personalized list of preventive services) with a due date: 
Health Maintenance Due Topic Date Due  
 Hepatitis C Test  1955  Cervical Cancer Screening  03/18/2017 [employer] Annual Well Visit  06/15/2018  Stool testing for trace blood  06/15/2018  Breast Cancer Screening  07/29/2018 Introducing [facility] [place] [facility]! Scooby Reich introduces Veoh patient portal. Now you can access parts of your medical record, email your doctor's office, and request medication refills online. 1. In your internet browser, go to [email or web address] 2. Click on the First Time User? Click Here link in the Sign In box. You will see the New Member Sign Up page. 3. Enter your Veoh Access Code exactly as it appears below.  You will not need to use this code after youve completed the sign-up process. If you do not sign up before the expiration date, you must request a new code. · MainOne Access Code: [email or web address] Expires: 10/14/2018 11:26 AM 
 
4. Enter the last four digits of your Social Security Number (xxxx) and Date of Birth (mm/dd/yyyy) as indicated and click Submit. You will be taken to the next sign-up page. 5. Create a MainOne ID. This will be your MainOne login ID and cannot be changed, so think of one that is secure and easy to remember. 6. Create a MainOne password. You can change your password at any time. 7. Enter your Password Reset Question and Answer. This can be used at a later time if you forget your password. 8. Enter your e-mail address. You will receive e-mail notification when new information is available in [address]. 9. Click Sign Up. You can now view and download portions of your medical record. 10. Click the Download Summary menu link to download a portable copy of your medical information. If you have questions, please visit the Frequently Asked Questions section of the MainOne website. Remember, MainOne is NOT to be used for urgent needs. For medical emergencies, dial 911. Now available from your iPhone and Android! Please provide this summary of care documentation to your next provider. Your primary care clinician is listed as Karma Hanna. If you have any questions after today's visit, please call [phone].

## 2018-07-16 NOTE — PROGRESS NOTES
Assessment/Plan:    *Diagnoses and all orders for this visit:    1. Type 2 diabetes mellitus with diabetic polyneuropathy, with long-term current use of insulin (HCC)  -     AMB POC HEMOGLOBIN A1C    2. Colon cancer screening  -     [facility]    3. Breast lesion  -     US BREAST RT LIMITED=<3 QUAD; Future    4. Obesity (BMI 30-39.9)    5. Medicare annual wellness visit, subsequent    Other orders  -     insulin glargine (LANTUS SOLOSTAR U-100 INSULIN) 100 unit/mL (3 mL) inpn; INJECT 48 UNITS BENEATH THE SKIN QD        Will increase Lantus to 48 units every day. F/u 3 months. Needs A1c. The plan was discussed with the patient. The patient verbalized understanding and is in agreement with the plan. All medication potential side effects were discussed with the patient.    -------------------------------------------------------------------------------------------------------------------        Ad Pierce is a 66 y.o. female and presents with Annual Wellness Visit (lower back pain)         Subjective:  Pt here for f/u. DM: not well controlled because she has not been compliant with checking her sugars at home. Still has not gone to have her colonoscopy. I have been requesting that she have this for years. Obesity: needs to work on this. Needs to return for repeat RT breast US with [facility] for a lesion that they are monitoring. ROS:  Constitutional: No recent weight change. No weakness/fatigue. No f/c. Skin: No rashes, change in nails/hair, itching   HENT: No HA, dizziness. No hearing loss/tinnitus. No nasal congestion/discharge. Eyes: No change in vision, double/blurred vision or eye pain/redness. Cardiovascular: No CP/palpitations. No CORADO/orthopnea/PND. Respiratory: No cough/sputum, dyspnea, wheezing. Gastointestinal: No dysphagia, reflux. No n/v. No constipation/diarrhea. No melena/rectal bleeding.    Genitourinary: No dysuria, urinary hesitancy, nocturia, hematuria. No incontinence. Musculoskeletal: No joint pain/stiffness. No muscle pain/tenderness. Endo: No heat/cold intolerance, no polyuria/polydypsia. Heme: No h/o anemia. No easy bleeding/bruising. Allergy/Immunology: No seasonal rhinitis. Denies frequent colds, sinus/ear infections. Neurological: No seizures/numbness/weakness. No paresthesias. Psychiatric:  No depression, anxiety. The problem list was updated as a part of today's visit. Patient Active Problem List   Diagnosis Code    HTN (hypertension) I10    Insomnia G47.00    GERD (gastroesophageal reflux disease) K21.9    Schizophrenia ([facility] F20.9    DM (diabetes mellitus) ([facility] E11.9    Hyperlipidemia E78.5    Diabetic retinopathy ([facility] E11.319    Cataract H26.9    Glaucoma H40.9    Sleep apnea, obstructive G47.33    Diabetic neuropathy (HCC) E11.40    Heel spur M77.30    Depression F32.9    Type 2 diabetes with nephropathy (HCC) E11.21    Severe obesity (BMI 35.0-39. 9) with comorbidity ([facility] E66.01    Obesity (BMI 30-39. 9) E66.9       The PSH, FH were reviewed. SH:  Social History   Substance Use Topics    Smoking status: Current Every Day Smoker     Packs/day: 0.50     Years: 21.00     Types: Cigarettes    Smokeless tobacco: Current User    Alcohol use No       Medications/Allergies:  Current Outpatient Prescriptions on File Prior to Visit   Medication Sig Dispense Refill    amitriptyline (ELAVIL) 100 mg tablet TAKE 1 TABLET BY MOUTH EVERY NIGHT. GENERIC FOR ELAVIL. 90 Tab 0    KENDALL PEN NEEDLE 32 gauge x 5/32\" ndle USE ONCE EVERY DAY AS DIRECTED 100 Pen Needle 1    ALCOHOL PREP PADS padm USE TWICE DAILY AS DIRECTED 200 Pad 1    amLODIPine (NORVASC) 10 mg tablet Take 1 Tab by mouth daily. 90 Tab 1    simvastatin (ZOCOR) 10 mg tablet Take 1 Tab by mouth nightly. 90 Tab 1    QUEtiapine (SEROQUEL) 50 mg tablet TAKE 1 TABLET BY MOUTH EVERY EVENING AS NEEDED (Patient taking differently: 100 mg.  TAKE 1 TABLET BY MOUTH EVERY EVENING AS NEEDED) 90 Tab 1    glipiZIDE SR (GLUCOTROL XL) 10 mg CR tablet TAKE 1 TABLET BY MOUTH TWICE DAILY 180 Tab 1    pantoprazole (PROTONIX) 40 mg tablet Take 1 Tab by mouth daily. 90 Tab 0    Ferrous Sulfate (SLOW FE) 47.5 mg iron TbER tablet Take 1 Tab by mouth daily. 90 Tab 1    Insulin Needles, Disposable, (KENDALL PEN NEEDLE) 32 gauge x 5/32\" ndle USE AS DIRECTED ONCE EVERY  Pen Needle 1    Blood-Glucose Meter (CONTOUR METER) monitoring kit DX E11.9 1 Kit 0    cholecalciferol, vitamin d3, (VITAMIN D) 1,000 unit tablet Take  by mouth daily.  lamoTRIgine (LAMICTAL) 25 mg tablet Take  by mouth daily.  buPROPion SR (WELLBUTRIN SR) 150 mg SR tablet Take 150 mg by mouth daily. 0     No current facility-administered medications on file prior to visit. Allergies   Allergen Reactions    Amaryl [Glimepiride] Nausea Only    Penicillins Nausea and Vomiting         Health Maintenance:   Health Maintenance   Topic Date Due    Hepatitis C Screening  1955    PAP AKA CERVICAL CYTOLOGY  03/18/2017    FOBT Q 1 YEAR AGE 50-75  06/15/2018    BREAST CANCER SCRN MAMMOGRAM  07/29/2018    Influenza Age 5 to Adult  08/01/2018    HEMOGLOBIN A1C Q6M  01/16/2019    EYE EXAM RETINAL OR DILATED Q1  03/21/2019    FOOT EXAM Q1  05/07/2019    MICROALBUMIN Q1  05/07/2019    LIPID PANEL Q1  05/07/2019    MEDICARE YEARLY EXAM  07/17/2019    GLAUCOMA SCREENING Q2Y  03/21/2020    DTaP/Tdap/Td series (2 - Td) 01/13/2027    ZOSTER VACCINE AGE 60>  Addressed    Pneumococcal 19-64 Medium Risk  Completed       Objective:  Visit Vitals    /72 (BP 1 Location: Left arm, BP Patient Position: Sitting)    Pulse 99    Temp 98.3 °F (36.8 °C) (Oral)    Resp 17    Ht 5' 5\" (1.651 m)    Wt 205 lb 6.4 oz (93.2 kg)    SpO2 97%    BMI 34.18 kg/m2          Nurses notes and VS reviewed.       Physical Examination: General appearance - alert, well appearing, and in no distress  Chest - clear to auscultation, no wheezes, rales or rhonchi, symmetric air entry  Heart - normal rate, regular rhythm, normal S1, S2, no murmurs, rubs, clicks or gallops  Abdomen - soft, nontender, nondistended, no masses or organomegaly  Musculoskeletal - no joint tenderness, deformity or swelling  Extremities - peripheral pulses normal, no pedal edema, no clubbing or cyanosis          Labwork and Ancillary Studies:    CBC w/Diff  Lab Results   Component Value Date/Time    WBC 11.2 05/07/2018 01:53 PM    HGB 13.0 05/07/2018 01:53 PM    PLATELET 062 99/83/9143 01:53 PM         Basic Metabolic Profile  Lab Results   Component Value Date/Time    Sodium 139 05/07/2018 01:53 PM    Potassium 4.4 05/07/2018 01:53 PM    Chloride 103 05/07/2018 01:53 PM    CO2 28 05/07/2018 01:53 PM    Anion gap 8 05/07/2018 01:53 PM    Glucose 216 (H) 05/07/2018 01:53 PM    BUN 14 05/07/2018 01:53 PM    Creatinine 1.21 05/07/2018 01:53 PM    BUN/Creatinine ratio 12 05/07/2018 01:53 PM    GFR est AA 55 (L) 05/07/2018 01:53 PM    GFR est non-AA 45 (L) 05/07/2018 01:53 PM    Calcium 9.3 05/07/2018 01:53 PM         LFT  Lab Results   Component Value Date/Time    ALT (SGPT) 34 05/07/2018 01:53 PM    AST (SGOT) 19 05/07/2018 01:53 PM    Alk.  phosphatase 119 (H) 05/07/2018 01:53 PM    Bilirubin, direct <0.1 05/07/2018 01:53 PM    Bilirubin, total 0.2 05/07/2018 01:53 PM         Cholesterol  Lab Results   Component Value Date/Time    Cholesterol, total 147 05/07/2018 01:53 PM    HDL Cholesterol 50 05/07/2018 01:53 PM    LDL, calculated 63.8 05/07/2018 01:53 PM    Triglyceride 166 (H) 05/07/2018 01:53 PM    CHOL/HDL Ratio 2.9 05/07/2018 01:53 PM

## 2018-07-16 NOTE — PROGRESS NOTES
Vern Leslie is a 66 y.o. female (: 1955) presenting to address:    Chief Complaint   Patient presents with   Dennis Caban Annual Wellness Visit     lower back pain       Vitals:    18 1021   BP: 110/72   Pulse: 99   Resp: 17   Temp: 98.3 °F (36.8 °C)   TempSrc: Oral   SpO2: 97%   Weight: 205 lb 6.4 oz (93.2 kg)   Height: 5' 5\" (1.651 m)   PainSc:   0 - No pain       Hearing/Vision:      Visual Acuity Screening    Right eye Left eye Both eyes   Without correction:      With correction: 20/20 20/25 20/20       Learning Assessment:     Learning Assessment 3/10/2014   PRIMARY LEARNER Patient   PRIMARY LANGUAGE ENGLISH   LEARNER PREFERENCE PRIMARY OTHER (COMMENT)   ANSWERED BY patient   RELATIONSHIP SELF     Depression Screening:     PHQ over the last two weeks 2017   PHQ Not Done -   Little interest or pleasure in doing things Not at all   Feeling down, depressed or hopeless Not at all   Total Score PHQ 2 0     Fall Risk Assessment:     Fall Risk Assessment, last 12 mths 2018   Able to walk? Yes   Fall in past 12 months? No     Abuse Screening:     Abuse Screening Questionnaire 2018   Do you ever feel afraid of your partner? N   Are you in a relationship with someone who physically or mentally threatens you? N   Is it safe for you to go home? Y     Coordination of Care Questionaire:   1. Have you been to the ER, urgent care clinic since your last visit? Hospitalized since your last visit? NO    2. Have you seen or consulted any other health care providers outside of the [address] [place] [address] since your last visit? Include any pap smears or colon screening. YES urology 2018. 2018 sleep study. Advanced Directive:   1. Do you have an Advanced Directive? NO    2. Would you like information on Advanced Directives?  NO

## 2018-07-16 NOTE — PATIENT INSTRUCTIONS
Medicare Wellness Visit, Female    The best way to live healthy is to have a lifestyle where you eat a well-balanced diet, exercise regularly, limit alcohol use, and quit all forms of tobacco/nicotine, if applicable. Regular preventive services are another way to keep healthy. Preventive services (vaccines, screening tests, monitoring & exams) can help personalize your care plan, which helps you manage your own care. Screening tests can find health problems at the earliest stages, when they are easiest to treat. [address] [facility] follows the current, evidence-based guidelines published by the [facility] Eric Adelina ([facility]) when recommending preventive services for our patients. Because we follow these guidelines, sometimes recommendations change over time as research supports it. (For example, mammograms used to be recommended annually. Even though Medicare will still pay for an annual mammogram, the newer guidelines recommend a mammogram every two years for women of average risk.)    Of course, you and your provider may decide to screen more often for some diseases, based on your risk and co-morbidities (chronic disease you are already diagnosed with). Preventive services for you include:    - Medicare offers their members a free annual wellness visit, which is time for you and your primary care provider to discuss and plan for your preventive service needs. Take advantage of this benefit every year!    -All people over age 72 should receive the recommended pneumonia vaccines. Current USPSTF guidelines recommend a series of two vaccines for the best pneumonia protection.     -All adults should have a yearly flu vaccine and a tetanus vaccine every 10 years. All adults age 61 years should receive a shingles vaccine once in their lifetime.      -A bone mass density test is recommended when a woman turns 65 to screen for osteoporosis.  This test is only recommended once as a screening. Some providers will use this same test as a disease monitoring tool if you already have osteoporosis. -All adults age 38-68 years who are overweight should have a diabetes screening test once every three years.     -Other screening tests & preventive services for persons with diabetes include: an eye exam to screen for diabetic retinopathy, a kidney function test, a foot exam, and stricter control over your cholesterol.     -Cardiovascular screening for adults with routine risk involves an electrocardiogram (ECG) at intervals determined by the provider.     -Colorectal cancer screenings should be done for adults age 54-65 years with normal risk. There are a number of acceptable methods of screening for this type of cancer. Each test has its own benefits and drawbacks. Discuss with your provider what is most appropriate for you during your annual wellness visit. The different tests include: colonoscopy (considered the best screening method), a fecal occult blood test, a fecal DNA test, and sigmoidoscopy. -Breast cancer screenings are recommended every other year for women of normal risk age 54-69 years.     -Cervical cancer screenings for women over age 72 are only recommended with certain risk factors.     -All adults born between [facility] should be screened once for Hepatitis C.      Here is a list of your current Health Maintenance items (your personalized list of preventive services) with a due date:  Health Maintenance Due   Topic Date Due    Hepatitis C Test  1955    Cervical Cancer Screening  03/18/2017    Annual Well Visit  06/15/2018    Stool testing for trace blood  06/15/2018    Breast Cancer Screening  07/29/2018

## 2018-07-16 NOTE — PROGRESS NOTES
This is the Subsequent Medicare Annual Wellness Exam, performed 12 months or more after the Initial AWV or the last Subsequent AWV    I have reviewed the patient's medical history in detail and updated the computerized patient record. History     Past Medical History:   Diagnosis Date    Cataract     Depression 1/13/2015    Diabetes [facility]     Diabetic neuropathy ([facility] 9/1/2013    Diabetic retinopathy ([facility] 3/2/2012    DM (diabetes mellitus) ([facility] 3/2/2012    GERD (gastroesophageal reflux disease) 4/21/2011    Glaucoma     Heel spur 11/24/2014    HTN (hypertension) 4/21/2011    Hyperlipidemia 3/2/2012    Insomnia 4/21/2011    Obesity (BMI 30-39. 9) 7/16/2018    [facility] 4/21/2011    Sleep apnea, obstructive 9/1/2013      Past Surgical History:   Procedure Laterality Date    BIOPSY OF BREAST, INCISIONAL  1970    left benign    HX OPEN CHOLECYSTECTOMY       Current Outpatient Prescriptions   Medication Sig Dispense Refill    fluticasone (FLONASE) 50 mcg/actuation nasal spray INHALE 2 SPRAYS IEN QD  0    LANTUS SOLOSTAR U-100 INSULIN 100 unit/mL (3 mL) inpn INJECT 38 UNITS BENEATH THE SKIN QD  3    meloxicam (MOBIC) 15 mg tablet TK 1 T PO  QD  0    amitriptyline (ELAVIL) 100 mg tablet TAKE 1 TABLET BY MOUTH EVERY NIGHT. GENERIC FOR ELAVIL. 90 Tab 0    KENDALL PEN NEEDLE 32 gauge x 5/32\" ndle USE ONCE EVERY DAY AS DIRECTED 100 Pen Needle 1    ALCOHOL PREP PADS padm USE TWICE DAILY AS DIRECTED 200 Pad 1    amLODIPine (NORVASC) 10 mg tablet Take 1 Tab by mouth daily. 90 Tab 1    simvastatin (ZOCOR) 10 mg tablet Take 1 Tab by mouth nightly. 90 Tab 1    QUEtiapine (SEROQUEL) 50 mg tablet TAKE 1 TABLET BY MOUTH EVERY EVENING AS NEEDED (Patient taking differently: 100 mg. TAKE 1 TABLET BY MOUTH EVERY EVENING AS NEEDED) 90 Tab 1    glipiZIDE SR (GLUCOTROL XL) 10 mg CR tablet TAKE 1 TABLET BY MOUTH TWICE DAILY 180 Tab 1    pantoprazole (PROTONIX) 40 mg tablet Take 1 Tab by mouth daily.  80 Tab 0    Ferrous Sulfate (SLOW FE) 47.5 mg iron TbER tablet Take 1 Tab by mouth daily. 90 Tab 1    Insulin Needles, Disposable, (KENDALL PEN NEEDLE) 32 gauge x 5/32\" ndle USE AS DIRECTED ONCE EVERY  Pen Needle 1    Blood-Glucose Meter (CONTOUR METER) monitoring kit DX E11.9 1 Kit 0    cholecalciferol, vitamin d3, (VITAMIN D) 1,000 unit tablet Take  by mouth daily.  lamoTRIgine (LAMICTAL) 25 mg tablet Take  by mouth daily.  insulin detemir U-100 (LEVEMIR FLEXTOUCH) 100 unit/mL (3 mL) inpn 38 Units by SubCUTAneous route daily. (Patient taking differently: 45 Units by SubCUTAneous route daily.) 30 mL 3    buPROPion SR (WELLBUTRIN SR) 150 mg SR tablet Take 150 mg by mouth daily. 0     Allergies   Allergen Reactions    Amaryl [Glimepiride] Nausea Only    Penicillins Nausea and Vomiting     Family History   Problem Relation Age of Onset    Family history unknown: Yes     Social History   Substance Use Topics    Smoking status: Current Every Day Smoker     Packs/day: 0.50     Years: 21.00     Types: Cigarettes    Smokeless tobacco: Current User    Alcohol use No     Patient Active Problem List   Diagnosis Code    HTN (hypertension) I10    Insomnia G47.00    GERD (gastroesophageal reflux disease) K21.9    Schizophrenia ([facility] F20.9    DM (diabetes mellitus) ([facility] E11.9    Hyperlipidemia E78.5    Diabetic retinopathy ([facility] E11.319    Cataract H26.9    Glaucoma H40.9    Sleep apnea, obstructive G47.33    Diabetic neuropathy (HCC) E11.40    Heel spur M77.30    Depression F32.9    Type 2 diabetes with nephropathy (HCC) E11.21    Severe obesity (BMI 35.0-39. 9) with comorbidity ([facility] E66.01    Obesity (BMI 30-39. 9) E66.9       Depression Risk Factor Screening:     PHQ over the last two weeks 7/16/2018   PHQ Not Done Patient Decline   Little interest or pleasure in doing things -   Feeling down, depressed or hopeless -   Total Score PHQ 2 -     Alcohol Risk Factor Screening:    You do not drink alcohol or very rarely. Functional Ability and Level of Safety:   Hearing Loss  Hearing is good. Activities of Daily Living  The home contains: no safety equipment. Patient does total self care    Fall Risk  Fall Risk Assessment, last 12 mths 7/16/2018   Able to walk? Yes   Fall in past 12 months? No       Abuse Screen  Patient is not abused    Cognitive Screening   Evaluation of Cognitive Function:  Has your family/caregiver stated any concerns about your memory: no  Normal    Patient Care Team   Patient Care Team:  Claudeen Figures, MD as PCP - General (Internal Medicine)    Assessment/Plan   Education and counseling provided:  Are appropriate based on today's review and evaluation    Diagnoses and all orders for this visit:    1. Medicare annual wellness visit, subsequent    2. Colon cancer screening  -     [facility]    3. Type 2 diabetes mellitus with diabetic polyneuropathy, with long-term current use of insulin (HCC)  -     AMB POC HEMOGLOBIN A1C    4. Breast lesion  -     US BREAST RT LIMITED=<3 QUAD; Future    5. Obesity (BMI 30-39. 9)        Health Maintenance Due   Topic Date Due    Hepatitis C Screening  1955    PAP AKA CERVICAL CYTOLOGY  03/18/2017    MEDICARE YEARLY EXAM  06/15/2018    FOBT Q 1 YEAR AGE 50-75  06/15/2018    BREAST CANCER SCRN MAMMOGRAM  07/29/2018

## 2018-07-17 DIAGNOSIS — N64.9 BREAST LESION: Primary | ICD-10-CM

## 2018-08-17 RX ORDER — PEN NEEDLE, DIABETIC 31 GX3/16"
NEEDLE, DISPOSABLE MISCELLANEOUS
Qty: 100 PEN NEEDLE | Refills: 1 | Status: SHIPPED | OUTPATIENT
Start: 2018-08-17 | End: 2018-09-19 | Stop reason: SDUPTHER

## 2018-08-17 RX ORDER — PEN NEEDLE, DIABETIC 31 GX3/16"
NEEDLE, DISPOSABLE MISCELLANEOUS
Qty: 100 PEN NEEDLE | Refills: 1 | Status: CANCELLED | OUTPATIENT
Start: 2018-08-17

## 2018-08-17 NOTE — TELEPHONE ENCOUNTER
Pt called to request a refill for her pen needles \"the part where you stick yourself and make yourself bleed\" because she only has 2 left. It was listed twice in her chart and I'm not sure which one is the correct one. Please review and advise if message should be routed to covering provider or if she can wait  fpr Dr Flory Angeles to come back to the office.  Please advise

## 2018-08-29 DIAGNOSIS — I10 ESSENTIAL HYPERTENSION: ICD-10-CM

## 2018-08-29 RX ORDER — AMLODIPINE BESYLATE 10 MG/1
10 TABLET ORAL DAILY
Qty: 90 TAB | Refills: 0 | Status: SHIPPED | OUTPATIENT
Start: 2018-08-29 | End: 2018-10-24 | Stop reason: SDUPTHER

## 2018-08-29 NOTE — TELEPHONE ENCOUNTER
Pt is in need of a prescription refill of the following prescription and would like a call once the prescription is ordered.

## 2018-09-07 DIAGNOSIS — E11.9 DIABETES MELLITUS WITHOUT COMPLICATION (HCC): ICD-10-CM

## 2018-09-07 RX ORDER — GLIPIZIDE 10 MG/1
TABLET, FILM COATED, EXTENDED RELEASE ORAL
Qty: 180 TAB | Refills: 0 | Status: SHIPPED | OUTPATIENT
Start: 2018-09-07 | End: 2018-09-11

## 2018-09-07 RX ORDER — MELOXICAM 15 MG/1
TABLET ORAL
Qty: 90 TAB | Refills: 0 | Status: SHIPPED | OUTPATIENT
Start: 2018-09-07 | End: 2018-12-11 | Stop reason: SDUPTHER

## 2018-09-10 NOTE — TELEPHONE ENCOUNTER
Patient last seen 7/16/2018 . Protonix 4/5/2018 for 90 , elavil 6/14/2018 for 90  .  Next OV 10/16/2018

## 2018-09-11 RX ORDER — AMITRIPTYLINE HYDROCHLORIDE 100 MG/1
TABLET, FILM COATED ORAL
Qty: 90 TAB | Refills: 0 | Status: SHIPPED | OUTPATIENT
Start: 2018-09-11 | End: 2018-12-11 | Stop reason: SDUPTHER

## 2018-09-11 RX ORDER — PANTOPRAZOLE SODIUM 40 MG/1
40 TABLET, DELAYED RELEASE ORAL DAILY
Qty: 90 TAB | Refills: 0 | OUTPATIENT
Start: 2018-09-11

## 2018-09-11 RX ORDER — AMITRIPTYLINE HYDROCHLORIDE 100 MG/1
TABLET, FILM COATED ORAL
Qty: 90 TAB | Refills: 0 | OUTPATIENT
Start: 2018-09-11

## 2018-09-11 RX ORDER — PANTOPRAZOLE SODIUM 40 MG/1
TABLET, DELAYED RELEASE ORAL
Qty: 90 TAB | Refills: 0 | Status: SHIPPED | OUTPATIENT
Start: 2018-09-11 | End: 2018-12-11 | Stop reason: SDUPTHER

## 2018-09-19 NOTE — TELEPHONE ENCOUNTER
Pt is calling to inform Dr. Francesco Capellan that she had her Kidney stone procedure this previous Monday. Procedure went well. She is also requesting a refill for insulin glargine (LANTUS SOLOSTAR U-100 INSULIN) 100 unit/mL (3mL) & Insulin Needles (I wasn't sure which one was correct.)    Needs a new referral for a colonoscopy, wants to go to [facility]. Seeing Neurologist tomorrow.      Pt is aware she may need an appt with  before her next scheduled one for the colonoscopy referral.     Future Appointments  Date Time Provider Kapil Sky   10/16/2018 10:15 AM Solo Horan MD [facility] [place], operated by [facility]

## 2018-09-19 NOTE — TELEPHONE ENCOUNTER
Script for Lantus last refilled 7/16/2018 for 30 ml with 3 refills pen needles 8/17/2018 for  100 with 1 refill . Last OV 7/16/2018 .  Next appointment 10/16/2018

## 2018-09-20 RX ORDER — PEN NEEDLE, DIABETIC 31 GX3/16"
NEEDLE, DISPOSABLE MISCELLANEOUS
Qty: 100 PEN NEEDLE | Refills: 1 | Status: SHIPPED | OUTPATIENT
Start: 2018-09-20 | End: 2019-03-04 | Stop reason: SDUPTHER

## 2018-09-20 RX ORDER — INSULIN GLARGINE 100 [IU]/ML
INJECTION, SOLUTION SUBCUTANEOUS
Qty: 30 ML | Refills: 1 | Status: SHIPPED | OUTPATIENT
Start: 2018-09-20 | End: 2019-01-16 | Stop reason: SDUPTHER

## 2018-09-30 DIAGNOSIS — E78.00 PURE HYPERCHOLESTEROLEMIA: ICD-10-CM

## 2018-10-02 RX ORDER — SIMVASTATIN 10 MG/1
TABLET, FILM COATED ORAL
Qty: 90 TAB | Refills: 0 | Status: SHIPPED | OUTPATIENT
Start: 2018-10-02 | End: 2019-01-10 | Stop reason: SDUPTHER

## 2018-10-16 ENCOUNTER — OFFICE VISIT (OUTPATIENT)
Dept: FAMILY MEDICINE CLINIC | Age: 63
End: 2018-10-16

## 2018-10-16 VITALS
RESPIRATION RATE: 18 BRPM | TEMPERATURE: 98.4 F | BODY MASS INDEX: 34.82 KG/M2 | HEIGHT: 65 IN | OXYGEN SATURATION: 98 % | WEIGHT: 209 LBS | HEART RATE: 94 BPM | SYSTOLIC BLOOD PRESSURE: 130 MMHG | DIASTOLIC BLOOD PRESSURE: 76 MMHG

## 2018-10-16 DIAGNOSIS — I69.319 CVA, OLD, COGNITIVE DEFICITS: ICD-10-CM

## 2018-10-16 DIAGNOSIS — I10 ESSENTIAL HYPERTENSION: ICD-10-CM

## 2018-10-16 DIAGNOSIS — E66.01 SEVERE OBESITY (BMI 35.0-39.9) WITH COMORBIDITY (HCC): ICD-10-CM

## 2018-10-16 DIAGNOSIS — E78.00 PURE HYPERCHOLESTEROLEMIA: ICD-10-CM

## 2018-10-16 DIAGNOSIS — E11.42 DIABETIC POLYNEUROPATHY ASSOCIATED WITH TYPE 2 DIABETES MELLITUS (HCC): Primary | ICD-10-CM

## 2018-10-16 LAB — HBA1C MFR BLD HPLC: 8.8 %

## 2018-10-16 NOTE — PATIENT INSTRUCTIONS

## 2018-10-16 NOTE — PROGRESS NOTES
Martina Morales is a 66 y.o. female (: 1955) presenting to address:  Patient DECLINED flu vaccine. Chief Complaint   Patient presents with    Diabetes       Vitals:    10/16/18 1038   BP: 130/76   Pulse: 94   Resp: 18   Temp: 98.4 °F (36.9 °C)   TempSrc: Oral   SpO2: 98%   Weight: 209 lb (94.8 kg)   Height: 5' 5\" (1.651 m)   PainSc:   0 - No pain       Hearing/Vision:   No exam data present    Learning Assessment:     Learning Assessment 3/10/2014   PRIMARY LEARNER Patient   PRIMARY LANGUAGE ENGLISH   LEARNER PREFERENCE PRIMARY OTHER (COMMENT)   ANSWERED BY patient   RELATIONSHIP SELF     Depression Screening:     PHQ over the last two weeks 2018   PHQ Not Done Patient Decline   Little interest or pleasure in doing things -   Feeling down, depressed, irritable, or hopeless -   Total Score PHQ 2 -     Fall Risk Assessment:     Fall Risk Assessment, last 12 mths 2018   Able to walk? Yes   Fall in past 12 months? No     Abuse Screening:     Abuse Screening Questionnaire 2018   Do you ever feel afraid of your partner? N   Are you in a relationship with someone who physically or mentally threatens you? N   Is it safe for you to go home? Y     Coordination of Care Questionaire:   1. Have you been to the ER, urgent care clinic since your last visit? Hospitalized since your last visit? NO    2. Have you seen or consulted any other health care providers outside of the [address] [place] [address] since your last visit? Include any pap smears or colon screening. YES Dr. Yessica Quiroz Renal 2018; Dr. Jc De La Rosa [facility] 2018. Advanced Directive:   1. Do you have an Advanced Directive? NO    2. Would you like information on Advanced Directives?  NO

## 2018-10-16 NOTE — MR AVS SNAPSHOT
[address] [place] [address] [address] [facility] [address] 
[phone] Patient: Marlon Baker MRN: [id number] Visit Information Date & Time Provider Department Dept. Phone Encounter #  
 10/16/2018 10:15 AM Silverio Tran, 3 [facility] [phone] [id number] Your Appointments 1/22/2019  1:00 PM  
[facility]TRASOUND with [facility]. [address] (Terie Rend) Appt Note: Return in 3 mos with U/S prior. [address] [facility] [address] [facility] [phone] [id number]  
  
    
  
 1/22/2019  1:30 PM  
Any with Keven Montgomery MD  
[facility]. [address] Ter[address] Rend) Appt Note: Return in 3 mos with U/S prior. [address] [facility] [address]  
[phone]  
  
   
 [facility] [address] [phone] Upcoming Health Maintenance Date Due Hepatitis C Screening 1955 Shingrix Vaccine Age 50> (1 of 2) 2/13/2007 PAP AKA CERVICAL CYTOLOGY 3/18/2017 FOBT Q 1 YEAR AGE 50-75 6/15/2018 Influenza Age 5 to Adult 8/1/2018 HEMOGLOBIN A1C Q6M 1/16/2019 BREAST CANCER SCRN MAMMOGRAM 2/21/2019 FOOT EXAM Q1 5/7/2019 MICROALBUMIN Q1 5/7/2019 LIPID PANEL Q1 5/7/2019 MEDICARE YEARLY EXAM 7/17/2019 EYE EXAM RETINAL OR DILATED Q1 9/26/2019 GLAUCOMA SCREENING Q2Y 9/26/2020 DTaP/Tdap/Td series (2 - Td) 1/13/2027 Allergies as of 10/16/2018  Review Complete On: 10/16/2018 By: Silverio Tran MD  
  
 Severity Noted Reaction Type Reactions Amaryl [Glimepiride]  03/22/2012    Nausea Only Penicillins  10/25/2010    Nausea and Vomiting Current Immunizations  Reviewed on 6/17/2016 Name Date Pneumococcal Polysaccharide (PPSV-23) 1/13/2017  9:02 AM  
  
 Not reviewed this visit You Were Diagnosed With   
  
 Codes Comments Diabetic polyneuropathy associated with type 2 diabetes mellitus ([facility]    -  Primary ICD-10-CM: E11.42 
ICD-9-CM: 250.60, 357.2 Severe obesity (BMI 35.0-39. 9) with comorbidity ([facility]     ICD-10-CM: E66.01 
ICD-9-CM: 278.01 Essential hypertension     ICD-10-CM: I10 
ICD-9-CM: 401.9 Pure hypercholesterolemia     ICD-10-CM: E78.00 ICD-9-CM: 272.0 Vitals BP Pulse Temp Resp Height(growth percentile) Weight(growth percentile) 130/76 (BP 1 Location: Right arm, BP Patient Position: Sitting) 94 98.4 °F (36.9 °C) (Oral) 18 5' 5\" (1.651 m) 209 lb (94.8 kg) SpO2 BMI OB Status Smoking Status 98% 34.78 kg/m2 Postmenopausal Current Every Day Smoker Vitals History BMI and BSA Data Body Mass Index Body Surface Area 34.78 kg/m 2 2.09 m 2 Preferred Pharmacy Pharmacy Name Phone [phone] [facility], [address] [facility] [address] [facility] [address] [phone] Your Updated Medication List  
  
   
This list is accurate as of 10/16/18 11:14 AM.  Always use your most recent med list. ALCOHOL PREP PADS Padm Generic drug:  alcohol swabs USE TWICE DAILY AS DIRECTED  
  
 amitriptyline 100 mg tablet Commonly known as:  ELAVIL TAKE 1 TABLET BY MOUTH EVERY NIGHT. GENERIC FOR ELAVIL. amLODIPine 10 mg tablet Commonly known as:  Ricky Schwalbe Take 1 Tab by mouth daily. aspirin delayed-release 81 mg tablet Take  by mouth daily. Blood-Glucose Meter monitoring kit Commonly known as:  CONTOUR METER  
DX E11.9 Ferrous Sulfate 47.5 mg iron Tber tablet Commonly known as:  SLOW FE Take 1 Tab by mouth daily. fluticasone 50 mcg/actuation nasal spray Commonly known as:  Antoine Beal INHALE 2 SPRAYS IEN QD  
  
 glipiZIDE SR 10 mg CR tablet Commonly known as:  GLUCOTROL XL  
TAKE 1 TABLET BY MOUTH TWICE DAILY  
  
 insulin glargine 100 unit/mL (3 mL) Inpn Commonly known as:  LANTUS SOLOSTAR U-100 INSULIN INJECT 48 UNITS BENEATH THE SKIN QD  
  
 meloxicam 15 mg tablet Commonly known as:  MOBIC  
TAKE 1 TABLET BY MOUTH EVERY DAY  
  
 * Rosalva Pen Needle 32 gauge x 5/32\" Ndle Generic drug:  Insulin Needles (Disposable) USE ONCE EVERY DAY AS DIRECTED * Insulin Needles (Disposable) 32 gauge x 5/32\" Ndle Commonly known as:  Rosalva Pen Needle USE AS DIRECTED ONCE EVERY DAY  
  
 pantoprazole 40 mg tablet Commonly known as:  PROTONIX  
TAKE 1 TABLET BY MOUTH EVERY DAY  
  
 QUEtiapine 50 mg tablet Commonly known as:  SEROquel TAKE 1 TABLET BY MOUTH EVERY EVENING AS NEEDED  
  
 simvastatin 10 mg tablet Commonly known as:  ZOCOR  
TAKE 1 TABLET BY MOUTH EVERY NIGHT  
  
 tamsulosin 0.4 mg capsule Commonly known as:  FLOMAX TAKE 1 CAPSULE BY MOUTH EVERY DAY  
  
 VITAMIN D3 1,000 unit tablet Generic drug:  cholecalciferol Take  by mouth daily. * Notice: This list has 2 medication(s) that are the same as other medications prescribed for you. Read the directions carefully, and ask your doctor or other care provider to review them with you. We Performed the Following AMB POC HEMOGLOBIN A1C [75006 CPT(R)] Patient Instructions Learning About Diabetes Food Guidelines Your Care Instructions Meal planning is important to manage diabetes. It helps keep your blood sugar at a target level (which you set with your doctor). You don't have to eat special foods. You can eat what your family eats, including sweets once in a while. But you do have to pay attention to how often you eat and how much you eat of certain foods. You may want to work with a dietitian or a certified diabetes educator (CDE) to help you plan meals and snacks. A dietitian or CDE can also help you lose weight if that is one of your goals. What should you know about eating carbs? Managing the amount of carbohydrate (carbs) you eat is an important part of healthy meals when you have diabetes. Carbohydrate is found in many foods. · Learn which foods have carbs. And learn the amounts of carbs in different foods. ¨ Bread, cereal, pasta, and rice have about 15 grams of carbs in a serving. A serving is 1 slice of bread (1 ounce), ½ cup of cooked cereal, or 1/3 cup of cooked pasta or rice. ¨ Fruits have 15 grams of carbs in a serving. A serving is 1 small fresh fruit, such as an apple or orange; ½ of a banana; ½ cup of cooked or canned fruit; ½ cup of fruit juice; 1 cup of melon or raspberries; or 2 tablespoons of dried fruit. ¨ Milk and no-sugar-added yogurt have 15 grams of carbs in a serving. A serving is 1 cup of milk or 2/3 cup of no-sugar-added yogurt. ¨ Starchy vegetables have 15 grams of carbs in a serving. A serving is ½ cup of mashed potatoes or sweet potato; 1 cup winter squash; ½ of a small baked potato; ½ cup of cooked beans; or ½ cup cooked corn or green peas. · Learn how much carbs to eat each day and at each meal. A dietitian or CDE can teach you how to keep track of the amount of carbs you eat. This is called carbohydrate counting. · If you are not sure how to count carbohydrate grams, use the Plate Method to plan meals. It is a good, quick way to make sure that you have a balanced meal. It also helps you spread carbs throughout the day. ¨ Divide your plate by types of foods. Put non-starchy vegetables on half the plate, meat or other protein food on one-quarter of the plate, and a grain or starchy vegetable in the final quarter of the plate. To this you can add a small piece of fruit and 1 cup of milk or yogurt, depending on how many carbs you are supposed to eat at a meal. 
· Try to eat about the same amount of carbs at each meal. Do not \"save up\" your daily allowance of carbs to eat at one meal. 
· Proteins have very little or no carbs per serving.  Examples of proteins are beef, chicken, turkey, fish, eggs, tofu, cheese, cottage cheese, and peanut butter. A serving size of meat is 3 ounces, which is about the size of a deck of cards. Examples of meat substitute serving sizes (equal to 1 ounce of meat) are 1/4 cup of cottage cheese, 1 egg, 1 tablespoon of peanut butter, and ½ cup of tofu. How can you eat out and still eat healthy? · Learn to estimate the serving sizes of foods that have carbohydrate. If you measure food at home, it will be easier to estimate the amount in a serving of restaurant food. · If the meal you order has too much carbohydrate (such as potatoes, corn, or baked beans), ask to have a low-carbohydrate food instead. Ask for a salad or green vegetables. · If you use insulin, check your blood sugar before and after eating out to help you plan how much to eat in the future. · If you eat more carbohydrate at a meal than you had planned, take a walk or do other exercise. This will help lower your blood sugar. What else should you know? · Limit saturated fat, such as the fat from meat and dairy products. This is a healthy choice because people who have diabetes are at higher risk of heart disease. So choose lean cuts of meat and nonfat or low-fat dairy products. Use olive or canola oil instead of butter or shortening when cooking. · Don't skip meals. Your blood sugar may drop too low if you skip meals and take insulin or certain medicines for diabetes. · Check with your doctor before you drink alcohol. Alcohol can cause your blood sugar to drop too low. Alcohol can also cause a bad reaction if you take certain diabetes medicines. Follow-up care is a key part of your treatment and safety. Be sure to make and go to all appointments, and call your doctor if you are having problems. It's also a good idea to know your test results and keep a list of the medicines you take. Where can you learn more? Go to http://daina-penelope.info/. Enter Q104 in the search box to learn more about \"Learning About Diabetes Food Guidelines. \" Current as of: December 7, 2017 Content Version: 11.8 © 6743-9225 Healthwise, [employer]orporated. Care instructions adapted under license by [employer] (which disclaims liability or warranty for this information). If you have questions about a medical condition or this instruction, always ask your healthcare professional. [address] any warranty or liability for your use of this information. Introducing [facility]! [facility] introduces GazeHawk patient portal. Now you can access parts of your medical record, email your doctor's office, and request medication refills online. 1. In your internet browser, go to [email or web address] 2. Click on the First Time User? Click Here link in the Sign In box. You will see the New Member Sign Up page. 3. Enter your GazeHawk Access Code exactly as it appears below. You will not need to use this code after youve completed the sign-up process. If you do not sign up before the expiration date, you must request a new code. · GazeHawk Access Code: [email or web address] Expires: 1/14/2019 11:14 AM 
 
4. Enter the last four digits of your Social Security Number (xxxx) and Date of Birth (mm/dd/yyyy) as indicated and click Submit. You will be taken to the next sign-up page. 5. Create a GazeHawk ID. This will be your GazeHawk login ID and cannot be changed, so think of one that is secure and easy to remember. 6. Create a GazeHawk password. You can change your password at any time. 7. Enter your Password Reset Question and Answer. This can be used at a later time if you forget your password. 8. Enter your e-mail address. You will receive e-mail notification when new information is available in [address] 9. Click Sign Up. You can now view and download portions of your medical record. 10. Click the Download Summary menu link to download a portable copy of your medical information. If you have questions, please visit the Frequently Asked Questions section of the [employer] website. Remember, [employer] is NOT to be used for urgent needs. For medical emergencies, dial 911. Now available from your iPhone and Android! Please provide this summary of care documentation to your next provider. Your primary care clinician is listed as [address]. If you have any questions after today's visit, please call [phone].

## 2018-10-16 NOTE — PROGRESS NOTES
Assessment/Plan:    *Diagnoses and all orders for this visit:    1. Diabetic polyneuropathy associated with type 2 diabetes mellitus (HCC)  -     AMB POC HEMOGLOBIN A1C    2. Severe obesity (BMI 35.0-39. 9) with comorbidity ([facility]    3. Essential hypertension    4. Pure hypercholesterolemia        F/u 3-4 months. She will continue to work on her weight. The plan was discussed with the patient. The patient verbalized understanding and is in agreement with the plan. All medication potential side effects were discussed with the patient.    -------------------------------------------------------------------------------------------------------------------        Alphonso Canavan is a 66 y.o. female and presents with Diabetes         Subjective:  Pt has been to the neurologist.  He confirmed that she has had a minor stroke in the past and this was why she has problems with her memory. She is stable and doing well. DM: excellent improvement, has dropped to 8.8% A1c. Taking Lantus 48 units. HTN: at goal, on meds. HLD: stable. Obesity: needs to work on her weight. Doing well with her DM and has lost a few lbs over time. ROS:  Review of Systems - Negative         The problem list was updated as a part of today's visit. Patient Active Problem List   Diagnosis Code    HTN (hypertension) I10    Insomnia G47.00    GERD (gastroesophageal reflux disease) K21.9    Schizophrenia ([facility] F20.9    DM (diabetes mellitus) ([facility] E11.9    Hyperlipidemia E78.5    Diabetic retinopathy ([facility] E11.319    Cataract H26.9    Glaucoma H40.9    Sleep apnea, obstructive G47.33    Diabetic neuropathy (HCC) E11.40    Heel spur M77.30    Depression F32.9    Type 2 diabetes with nephropathy (HCC) E11.21    Severe obesity (BMI 35.0-39. 9) with comorbidity ([facility] E66.01    Obesity (BMI 30-39. 9) E66.9    Stroke (HCC) I63.9       The PSH, FH were reviewed.         SH:  Social History   Substance Use Topics    Smoking status: Current Every Day Smoker     Packs/day: 0.50     Years: 21.00     Types: Cigarettes    Smokeless tobacco: Former User    Alcohol use No       Medications/Allergies:  Current Outpatient Prescriptions on File Prior to Visit   Medication Sig Dispense Refill    simvastatin (ZOCOR) 10 mg tablet TAKE 1 TABLET BY MOUTH EVERY NIGHT 90 Tab 0    aspirin delayed-release 81 mg tablet Take  by mouth daily.  insulin glargine (LANTUS SOLOSTAR U-100 INSULIN) 100 unit/mL (3 mL) inpn INJECT 48 UNITS BENEATH THE SKIN QD 30 mL 1    Insulin Needles, Disposable, (KENDALL PEN NEEDLE) 32 gauge x 5/32\" ndle USE AS DIRECTED ONCE EVERY  Pen Needle 1    amitriptyline (ELAVIL) 100 mg tablet TAKE 1 TABLET BY MOUTH EVERY NIGHT. GENERIC FOR ELAVIL. 90 Tab 0    pantoprazole (PROTONIX) 40 mg tablet TAKE 1 TABLET BY MOUTH EVERY DAY 90 Tab 0    meloxicam (MOBIC) 15 mg tablet TAKE 1 TABLET BY MOUTH EVERY DAY 90 Tab 0    amLODIPine (NORVASC) 10 mg tablet Take 1 Tab by mouth daily. 90 Tab 0    fluticasone (FLONASE) 50 mcg/actuation nasal spray INHALE 2 SPRAYS IEN QD  0    KENDALL PEN NEEDLE 32 gauge x 5/32\" ndle USE ONCE EVERY DAY AS DIRECTED 100 Pen Needle 1    ALCOHOL PREP PADS padm USE TWICE DAILY AS DIRECTED 200 Pad 1    QUEtiapine (SEROQUEL) 50 mg tablet TAKE 1 TABLET BY MOUTH EVERY EVENING AS NEEDED (Patient taking differently: 100 mg. TAKE 1 TABLET BY MOUTH EVERY EVENING AS NEEDED) 90 Tab 1    glipiZIDE SR (GLUCOTROL XL) 10 mg CR tablet TAKE 1 TABLET BY MOUTH TWICE DAILY 180 Tab 1    Ferrous Sulfate (SLOW FE) 47.5 mg iron TbER tablet Take 1 Tab by mouth daily. 90 Tab 1    Blood-Glucose Meter (CONTOUR METER) monitoring kit DX E11.9 1 Kit 0    cholecalciferol, vitamin d3, (VITAMIN D) 1,000 unit tablet Take  by mouth daily.  tamsulosin (FLOMAX) 0.4 mg capsule TAKE 1 CAPSULE BY MOUTH EVERY DAY 90 Cap 0     No current facility-administered medications on file prior to visit.          Allergies   Allergen Reactions    Amaryl [Glimepiride] Nausea Only    Penicillins Nausea and Vomiting         Health Maintenance:   Health Maintenance   Topic Date Due    Hepatitis C Screening  1955    Shingrix Vaccine Age 50> (1 of 2) 02/13/2007    PAP AKA CERVICAL CYTOLOGY  03/18/2017    FOBT Q 1 YEAR AGE 50-75  06/15/2018    Influenza Age 5 to Adult  08/01/2018    HEMOGLOBIN A1C Q6M  01/16/2019    BREAST CANCER SCRN MAMMOGRAM  02/21/2019    FOOT EXAM Q1  05/07/2019    MICROALBUMIN Q1  05/07/2019    LIPID PANEL Q1  05/07/2019    MEDICARE YEARLY EXAM  07/17/2019    EYE EXAM RETINAL OR DILATED Q1  09/26/2019    GLAUCOMA SCREENING Q2Y  09/26/2020    DTaP/Tdap/Td series (2 - Td) 01/13/2027    Pneumococcal 19-64 Medium Risk  Completed       Objective:  Visit Vitals    /76 (BP 1 Location: Right arm, BP Patient Position: Sitting)    Pulse 94    Temp 98.4 °F (36.9 °C) (Oral)    Resp 18    Ht 5' 5\" (1.651 m)    Wt 209 lb (94.8 kg)    SpO2 98%    BMI 34.78 kg/m2          Nurses notes and VS reviewed.       Physical Examination: General appearance - alert, well appearing, and in no distress  Chest - clear to auscultation, no wheezes, rales or rhonchi, symmetric air entry  Heart - normal rate, regular rhythm, normal S1, S2, no murmurs, rubs, clicks or gallops  Abdomen - soft, nontender, nondistended, no masses or organomegaly        Labwork and Ancillary Studies:    CBC w/Diff  Lab Results   Component Value Date/Time    WBC 9.1 09/11/2018 12:25 PM    HGB 11.9 09/11/2018 12:25 PM    PLATELET 639 51/64/4411 12:25 PM         Basic Metabolic Profile  Lab Results   Component Value Date/Time    Sodium 137 09/11/2018 12:25 PM    Potassium 5.1 09/11/2018 12:25 PM    Chloride 97 09/11/2018 12:25 PM    CO2 25 09/11/2018 12:25 PM    Anion gap 8 05/07/2018 01:53 PM    Glucose 206 (H) 09/11/2018 12:25 PM    BUN 21 09/11/2018 12:25 PM    Creatinine 0.90 09/11/2018 12:25 PM    BUN/Creatinine ratio 23 09/11/2018 12:25 PM    GFR est AA 80 09/11/2018 12:25 PM    GFR est non-AA 69 09/11/2018 12:25 PM    Calcium 9.4 09/11/2018 12:25 PM         LFT  Lab Results   Component Value Date/Time    ALT (SGPT) 34 05/07/2018 01:53 PM    AST (SGOT) 19 05/07/2018 01:53 PM    Alk.  phosphatase 119 (H) 05/07/2018 01:53 PM    Bilirubin, direct <0.1 05/07/2018 01:53 PM    Bilirubin, total 0.2 05/07/2018 01:53 PM         Cholesterol  Lab Results   Component Value Date/Time    Cholesterol, total 147 05/07/2018 01:53 PM    HDL Cholesterol 50 05/07/2018 01:53 PM    LDL, calculated 63.8 05/07/2018 01:53 PM    Triglyceride 166 (H) 05/07/2018 01:53 PM    CHOL/HDL Ratio 2.9 05/07/2018 01:53 PM

## 2018-10-24 DIAGNOSIS — I10 ESSENTIAL HYPERTENSION: ICD-10-CM

## 2018-10-24 RX ORDER — AMLODIPINE BESYLATE 10 MG/1
10 TABLET ORAL DAILY
Qty: 90 TAB | Refills: 0 | Status: SHIPPED | OUTPATIENT
Start: 2018-10-24 | End: 2019-01-12 | Stop reason: SDUPTHER

## 2018-10-25 ENCOUNTER — TELEPHONE (OUTPATIENT)
Dept: FAMILY MEDICINE CLINIC | Age: 63
End: 2018-10-25

## 2018-10-25 NOTE — TELEPHONE ENCOUNTER
Pt called stating that she tried to  her hp medication and the pharmacy informed her that her ins will not cover her medication until the end of November. Pt states she is completely out of her medication and needs her bp medication. Pt was informed that she was last prescribed the medication on 8/29/18 for 90 days so she should not be out.  Pt claims she has been taking one pill a day as per instructions and does not know why she is out

## 2018-10-25 NOTE — TELEPHONE ENCOUNTER
I called and spoke with pharmacy patient refilled medication on 9/4 for 90 days and her insurance will not pay until November 11 th . I asked if there was anything that could be done to get patient medication to cover her until she is due for a refill pharmacist said she could pay cash price . I called and spoke with patient notified her of this and asked if maybe she had another bottle that she missed with more pills patient said she would look but she took the last pill in her bottle and will just have to wait and monitor her blood pressure because she doesn't have 100 dollars to pay out of pocket.

## 2018-11-15 DIAGNOSIS — I10 ESSENTIAL HYPERTENSION: ICD-10-CM

## 2018-11-15 RX ORDER — AMLODIPINE BESYLATE 10 MG/1
TABLET ORAL
Qty: 90 TAB | Refills: 0 | Status: SHIPPED | OUTPATIENT
Start: 2018-11-15 | End: 2019-01-16 | Stop reason: SDUPTHER

## 2018-11-27 LAB — HEMOCCULT STL QL: NEGATIVE

## 2018-12-12 RX ORDER — PANTOPRAZOLE SODIUM 40 MG/1
TABLET, DELAYED RELEASE ORAL
Qty: 90 TAB | Refills: 0 | Status: SHIPPED | OUTPATIENT
Start: 2018-12-12 | End: 2019-01-12 | Stop reason: SDUPTHER

## 2018-12-12 RX ORDER — AMITRIPTYLINE HYDROCHLORIDE 100 MG/1
TABLET, FILM COATED ORAL
Qty: 90 TAB | Refills: 0 | Status: SHIPPED | OUTPATIENT
Start: 2018-12-12 | End: 2019-03-21 | Stop reason: SDUPTHER

## 2018-12-12 RX ORDER — MELOXICAM 15 MG/1
TABLET ORAL
Qty: 90 TAB | Refills: 0 | Status: SHIPPED | OUTPATIENT
Start: 2018-12-12 | End: 2019-01-12 | Stop reason: SDUPTHER

## 2019-01-10 DIAGNOSIS — E78.00 PURE HYPERCHOLESTEROLEMIA: ICD-10-CM

## 2019-01-10 RX ORDER — SIMVASTATIN 10 MG/1
TABLET, FILM COATED ORAL
Qty: 90 TAB | Refills: 0 | Status: SHIPPED | OUTPATIENT
Start: 2019-01-10 | End: 2019-01-12 | Stop reason: SDUPTHER

## 2019-01-12 DIAGNOSIS — I10 ESSENTIAL HYPERTENSION: ICD-10-CM

## 2019-01-12 DIAGNOSIS — E78.00 PURE HYPERCHOLESTEROLEMIA: ICD-10-CM

## 2019-01-13 RX ORDER — AMLODIPINE BESYLATE 10 MG/1
TABLET ORAL
Qty: 90 TAB | Refills: 0 | Status: SHIPPED | OUTPATIENT
Start: 2019-01-13 | End: 2019-05-01 | Stop reason: SDUPTHER

## 2019-01-13 RX ORDER — MELOXICAM 15 MG/1
TABLET ORAL
Qty: 90 TAB | Refills: 0 | Status: SHIPPED | OUTPATIENT
Start: 2019-01-13 | End: 2019-11-27 | Stop reason: SDUPTHER

## 2019-01-13 RX ORDER — SIMVASTATIN 10 MG/1
TABLET, FILM COATED ORAL
Qty: 90 TAB | Refills: 0 | Status: SHIPPED | OUTPATIENT
Start: 2019-01-13 | End: 2019-07-24 | Stop reason: SDUPTHER

## 2019-01-13 RX ORDER — PANTOPRAZOLE SODIUM 40 MG/1
TABLET, DELAYED RELEASE ORAL
Qty: 90 TAB | Refills: 0 | Status: SHIPPED | OUTPATIENT
Start: 2019-01-13 | End: 2019-06-03 | Stop reason: SDUPTHER

## 2019-01-16 ENCOUNTER — OFFICE VISIT (OUTPATIENT)
Dept: FAMILY MEDICINE CLINIC | Age: 64
End: 2019-01-16

## 2019-01-16 VITALS
DIASTOLIC BLOOD PRESSURE: 80 MMHG | OXYGEN SATURATION: 98 % | RESPIRATION RATE: 24 BRPM | BODY MASS INDEX: 36.65 KG/M2 | TEMPERATURE: 99.1 F | SYSTOLIC BLOOD PRESSURE: 124 MMHG | WEIGHT: 220 LBS | HEART RATE: 115 BPM | HEIGHT: 65 IN

## 2019-01-16 DIAGNOSIS — E11.9 DIABETES MELLITUS WITHOUT COMPLICATION (HCC): ICD-10-CM

## 2019-01-16 DIAGNOSIS — Z13.5 SCREENING FOR GLAUCOMA: ICD-10-CM

## 2019-01-16 DIAGNOSIS — E78.00 PURE HYPERCHOLESTEROLEMIA: ICD-10-CM

## 2019-01-16 DIAGNOSIS — Z79.4 TYPE 2 DIABETES MELLITUS WITH DIABETIC POLYNEUROPATHY, WITH LONG-TERM CURRENT USE OF INSULIN (HCC): Primary | ICD-10-CM

## 2019-01-16 DIAGNOSIS — E11.42 TYPE 2 DIABETES MELLITUS WITH DIABETIC POLYNEUROPATHY, WITH LONG-TERM CURRENT USE OF INSULIN (HCC): Primary | ICD-10-CM

## 2019-01-16 DIAGNOSIS — H53.8 BLURRED VISION: ICD-10-CM

## 2019-01-16 DIAGNOSIS — I10 ESSENTIAL HYPERTENSION: ICD-10-CM

## 2019-01-16 LAB — HBA1C MFR BLD HPLC: 9.1 %

## 2019-01-16 RX ORDER — INSULIN GLARGINE 100 [IU]/ML
INJECTION, SOLUTION SUBCUTANEOUS
Qty: 30 ML | Refills: 1 | Status: SHIPPED | OUTPATIENT
Start: 2019-01-16 | End: 2019-03-01 | Stop reason: SDUPTHER

## 2019-01-16 RX ORDER — GLIPIZIDE 10 MG/1
TABLET, FILM COATED, EXTENDED RELEASE ORAL
Qty: 180 TAB | Refills: 1 | Status: SHIPPED | OUTPATIENT
Start: 2019-01-16 | End: 2019-07-16 | Stop reason: SDUPTHER

## 2019-01-16 NOTE — PROGRESS NOTES
Assessment/Plan:    *Diagnoses and all orders for this visit:    1. Type 2 diabetes mellitus with diabetic polyneuropathy, with long-term current use of insulin (HCC)  -     AMB POC HEMOGLOBIN A1C    2. Essential hypertension    3. Pure hypercholesterolemia    4. Diabetes mellitus without complication (HCC)  -     glipiZIDE SR (GLUCOTROL XL) 10 mg CR tablet; TAKE 1 TABLET BY MOUTH TWICE DAILY    Other orders  -     insulin glargine (LANTUS SOLOSTAR U-100 INSULIN) 100 unit/mL (3 mL) inpn; INJECT 48 UNITS BENEATH THE SKIN QD      Needs to improve her A1c. Will work on diet, exercise and losing weight. Will NOT increase meds at this time. F/u 3 months. Just A1c. The plan was discussed with the patient. The patient verbalized understanding and is in agreement with the plan. All medication potential side effects were discussed with the patient.    -------------------------------------------------------------------------------------------------------------------        Shilpi Mcneil is a 66 y.o. female and presents with Diabetes         Subjective:  Pt here for f/u. DM: not well controlled, A1c 9.1%. HTN: controlled. HLD: stable. ROS:  Review of Systems - Negative         The problem list was updated as a part of today's visit. Patient Active Problem List   Diagnosis Code    HTN (hypertension) I10    Insomnia G47.00    GERD (gastroesophageal reflux disease) K21.9    Schizophrenia ([facility] F20.9    DM (diabetes mellitus) ([facility] E11.9    Hyperlipidemia E78.5    Di[facility] retin[facility] ([facility] E11.319    Cataract H26.9    Glaucoma H40.9    Sleep apnea, obstructive G47.33    Diabetic neuropathy (HCC) E11.40    Heel spur M77.30    Depression F32.9    Type 2 diabetes with nephropathy (HCC) E11.21    Severe obesity (BMI 35.0-39. 9) with comorbidity ([facility] E66.01    Obesity (BMI 30-39. 9) E66.9    CVA, old, cognitive deficits I69.319       The PSH, FH were reviewed.         SH:  Social History Tobacco Use    Smoking status: Current Every Day Smoker     Packs/day: 0.50     Years: 21.00     Pack years: 10.50     Types: Cigarettes    Smokeless tobacco: Former User   Substance Use Topics    Alcohol use: No    Drug use: No       Medications/Allergies:  Current Outpatient Medications on File Prior to Visit   Medication Sig Dispense Refill    amLODIPine (NORVASC) 10 mg tablet TAKE 1 TABLET BY MOUTH DAILY 90 Tab 0    pantoprazole (PROTONIX) 40 mg tablet TAKE 1 TABLET BY MOUTH EVERY DAY 90 Tab 0    meloxicam (MOBIC) 15 mg tablet TAKE 1 TABLET BY MOUTH EVERY DAY 90 Tab 0    simvastatin (ZOCOR) 10 mg tablet TAKE 1 TABLET BY MOUTH EVERY NIGHT 90 Tab 0    QUEtiapine (SEROQUEL) 50 mg tablet TAKE 1 TABLET BY MOUTH EVERY EVENING AS NEEDED 90 Tab 0    amitriptyline (ELAVIL) 100 mg tablet TAKE 1 TABLET BY MOUTH EVERY NIGHT. GENERIC FOR ELAVIL. 90 Tab 0    aspirin delayed-release 81 mg tablet Take  by mouth daily.  Insulin Needles, Disposable, (KENDALL PEN NEEDLE) 32 gauge x 5/32\" ndle USE AS DIRECTED ONCE EVERY  Pen Needle 1    KENDALL PEN NEEDLE 32 gauge x 5/32\" ndle USE ONCE EVERY DAY AS DIRECTED 100 Pen Needle 1    ALCOHOL PREP PADS padm USE TWICE DAILY AS DIRECTED 200 Pad 1    Ferrous Sulfate (SLOW FE) 47.5 mg iron TbER tablet Take 1 Tab by mouth daily. 90 Tab 1    Blood-Glucose Meter (CONTOUR METER) monitoring kit DX E11.9 1 Kit 0    cholecalciferol, vitamin d3, (VITAMIN D) 1,000 unit tablet Take  by mouth daily. No current facility-administered medications on file prior to visit.          Allergies   Allergen Reactions    Amaryl [Glimepiride] Nausea Only    Penicillins Nausea and Vomiting         Health Maintenance:   Health Maintenance   Topic Date Due    Hepatitis C Screening  1955    Shingrix Vaccine Age 50> (1 of 2) 02/13/2007    PAP AKA CERVICAL CYTOLOGY  03/18/2017    FOBT Q 1 YEAR AGE 50-75  06/15/2018    BREAST CANCER SCRN MAMMOGRAM  02/21/2019    Influenza Age 5 to Adult  10/20/2026 (Originally 8/1/2018)    HEMOGLOBIN A1C Q6M  04/16/2019    FOOT EXAM Q1  05/07/2019    MICROALBUMIN Q1  05/07/2019    LIPID PANEL Q1  05/07/2019    MEDICARE YEARLY EXAM  07/17/2019    GLAUCOMA SCREENING Q2Y  09/26/2020    EYE EXAM RETINAL OR DILATED  09/26/2020    DTaP/Tdap/Td series (2 - Td) 01/13/2027    Pneumococcal 19-64 Medium Risk  Completed       Objective:  Visit Vitals  /80 (BP 1 Location: Left arm, BP Patient Position: Sitting)   Pulse (!) 115   Temp 99.1 °F (37.3 °C) (Oral)   Resp 24   Ht 5' 5\" (1.651 m)   Wt 220 lb (99.8 kg)   SpO2 98%   BMI 36.61 kg/m²          Nurses notes and VS reviewed. Physical Examination: General appearance - alert, well appearing, and in no distress  Chest - clear to auscultation, no wheezes, rales or rhonchi, symmetric air entry  Heart - normal rate, regular rhythm, normal S1, S2, no murmurs, rubs, clicks or gallops          Labwork and Ancillary Studies:    CBC w/Diff  Lab Results   Component Value Date/Time    WBC 9.1 09/11/2018 12:25 PM    HGB 11.9 09/11/2018 12:25 PM    PLATELET 256 92/71/6806 12:25 PM         Basic Metabolic Profile  Lab Results   Component Value Date/Time    Sodium 137 09/11/2018 12:25 PM    Potassium 5.1 09/11/2018 12:25 PM    Chloride 97 09/11/2018 12:25 PM    CO2 25 09/11/2018 12:25 PM    Anion gap 8 05/07/2018 01:53 PM    Glucose 206 (H) 09/11/2018 12:25 PM    BUN 21 09/11/2018 12:25 PM    Creatinine 0.90 09/11/2018 12:25 PM    BUN/Creatinine ratio 23 09/11/2018 12:25 PM    GFR est AA 80 09/11/2018 12:25 PM    GFR est non-AA 69 09/11/2018 12:25 PM    Calcium 9.4 09/11/2018 12:25 PM         LFT  Lab Results   Component Value Date/Time    ALT (SGPT) 34 05/07/2018 01:53 PM    AST (SGOT) 19 05/07/2018 01:53 PM    Alk.  phosphatase 119 (H) 05/07/2018 01:53 PM    Bilirubin, direct <0.1 05/07/2018 01:53 PM    Bilirubin, total 0.2 05/07/2018 01:53 PM         Cholesterol  Lab Results   Component Value Date/Time Cholesterol, total 147 05/07/2018 01:53 PM    HDL Cholesterol 50 05/07/2018 01:53 PM    LDL, calculated 63.8 05/07/2018 01:53 PM    Triglyceride 166 (H) 05/07/2018 01:53 PM    CHOL/HDL Ratio 2.9 05/07/2018 01:53 PM

## 2019-01-16 NOTE — PATIENT INSTRUCTIONS

## 2019-01-16 NOTE — PROGRESS NOTES
Ricci Stovall is a 66 y.o. female (: 1955) presenting to address:    Chief Complaint   Patient presents with    Diabetes       Vitals:    19 1300   BP: 124/80   Pulse: (!) 115   Resp: 24   Temp: 99.1 °F (37.3 °C)   TempSrc: Oral   SpO2: 98%   Weight: 220 lb (99.8 kg)   Height: 5' 5\" (1.651 m)   PainSc:   0 - No pain       Hearing/Vision:   No exam data present    Learning Assessment:     Learning Assessment 3/10/2014   PRIMARY LEARNER Patient   PRIMARY LANGUAGE ENGLISH   LEARNER PREFERENCE PRIMARY OTHER (COMMENT)   ANSWERED BY patient   RELATIONSHIP SELF     Depression Screening:     PHQ over the last two weeks 2018   PHQ Not Done Patient Decline   Little interest or pleasure in doing things -   Feeling down, depressed, irritable, or hopeless -   Total Score PHQ 2 -     Fall Risk Assessment:     Fall Risk Assessment, last 12 mths 2018   Able to walk? Yes   Fall in past 12 months? No     Abuse Screening:     Abuse Screening Questionnaire 2018   Do you ever feel afraid of your partner? N   Are you in a relationship with someone who physically or mentally threatens you? N   Is it safe for you to go home? Y     Coordination of Care Questionaire:   1. Have you been to the ER, urgent care clinic since your last visit? Hospitalized since your last visit? NO    2. Have you seen or consulted any other health care providers outside of the [address] [place] [address] since your last visit? Include any pap smears or colon screening. NO    Advanced Directive:   1. Do you have an Advanced Directive? NO    2. Would you like information on Advanced Directives?  NO

## 2019-01-19 DIAGNOSIS — E11.9 DIABETES MELLITUS WITHOUT COMPLICATION (HCC): ICD-10-CM

## 2019-01-20 RX ORDER — GLIPIZIDE 10 MG/1
TABLET, FILM COATED, EXTENDED RELEASE ORAL
Qty: 180 TAB | Refills: 0 | Status: SHIPPED | OUTPATIENT
Start: 2019-01-20 | End: 2020-04-15 | Stop reason: SDUPTHER

## 2019-02-11 ENCOUNTER — TELEPHONE (OUTPATIENT)
Dept: FAMILY MEDICINE CLINIC | Age: 64
End: 2019-02-11

## 2019-02-11 NOTE — TELEPHONE ENCOUNTER
COBY from [facility] calling to request referral for Opthamology. Referral is for the following condition or symptoms: glaucoma suspected H40.0    They would like the referral to go to [facility] office visit with [facility] provider was 1.16.19      COBY From [facility]  called on behalf of the pt to request a referral for the pt. She has [employer].  Please advise

## 2019-02-13 NOTE — TELEPHONE ENCOUNTER
Called patient to let her know that the referral for Opthalmology was ordered and everything was faxed over, patient verbalized understanding.

## 2019-03-01 ENCOUNTER — TELEPHONE (OUTPATIENT)
Dept: FAMILY MEDICINE CLINIC | Age: 64
End: 2019-03-01

## 2019-03-01 RX ORDER — PEN NEEDLE, DIABETIC 31 GX3/16"
NEEDLE, DISPOSABLE MISCELLANEOUS
Qty: 100 PEN NEEDLE | Refills: 1 | Status: CANCELLED | OUTPATIENT
Start: 2019-03-01

## 2019-03-01 NOTE — TELEPHONE ENCOUNTER
Pt called to letter doctor know that she does not like current health plan and she is thinking about changing it.

## 2019-03-01 NOTE — TELEPHONE ENCOUNTER
Pt called requesting a refill for fulticasone tropionate nasal spray 50 mg.   Medication not listed in chart

## 2019-03-01 NOTE — TELEPHONE ENCOUNTER
Please have pt verify flonase is ok in setting of her glaucoma. It can affect the pressures of the eye.

## 2019-03-04 RX ORDER — INSULIN GLARGINE 100 [IU]/ML
INJECTION, SOLUTION SUBCUTANEOUS
Qty: 30 ML | Refills: 0 | Status: SHIPPED | OUTPATIENT
Start: 2019-03-04 | End: 2019-04-16 | Stop reason: SDUPTHER

## 2019-03-04 RX ORDER — PEN NEEDLE, DIABETIC 31 GX3/16"
NEEDLE, DISPOSABLE MISCELLANEOUS
Qty: 100 PEN NEEDLE | Refills: 0 | Status: SHIPPED | OUTPATIENT
Start: 2019-03-04 | End: 2020-03-16 | Stop reason: SDUPTHER

## 2019-03-04 NOTE — TELEPHONE ENCOUNTER
Patient called requesting refill of Lantus and pen needles.      Lantus last refilled 01/16/2019; Qty 30mL with 1 Refill    Pen Needles last refilled 09/20/2018; Qty 100 with 1 refill

## 2019-03-06 NOTE — TELEPHONE ENCOUNTER
Called the patient to ask her about the flonase, the patient stated that she did not realize that Dr. Omari Loyd had discontinued it. She stated that she was okay with it being discontinued.

## 2019-03-22 RX ORDER — AMITRIPTYLINE HYDROCHLORIDE 100 MG/1
TABLET, FILM COATED ORAL
Qty: 90 TAB | Refills: 0 | Status: SHIPPED | OUTPATIENT
Start: 2019-03-22 | End: 2019-06-16 | Stop reason: SDUPTHER

## 2019-04-10 RX ORDER — QUETIAPINE FUMARATE 50 MG/1
TABLET, FILM COATED ORAL
Qty: 90 TAB | Refills: 0 | Status: SHIPPED | OUTPATIENT
Start: 2019-04-10 | End: 2019-07-05 | Stop reason: SDUPTHER

## 2019-04-16 ENCOUNTER — OFFICE VISIT (OUTPATIENT)
Dept: FAMILY MEDICINE CLINIC | Age: 64
End: 2019-04-16

## 2019-04-16 VITALS
HEIGHT: 65 IN | OXYGEN SATURATION: 96 % | RESPIRATION RATE: 16 BRPM | TEMPERATURE: 98.4 F | WEIGHT: 210 LBS | DIASTOLIC BLOOD PRESSURE: 68 MMHG | BODY MASS INDEX: 34.99 KG/M2 | HEART RATE: 94 BPM | SYSTOLIC BLOOD PRESSURE: 114 MMHG

## 2019-04-16 DIAGNOSIS — N63.0 BREAST MASS: ICD-10-CM

## 2019-04-16 DIAGNOSIS — E11.21 TYPE 2 DIABETES WITH NEPHROPATHY (HCC): Primary | ICD-10-CM

## 2019-04-16 DIAGNOSIS — E55.9 HYPOVITAMINOSIS D: ICD-10-CM

## 2019-04-16 DIAGNOSIS — E78.00 PURE HYPERCHOLESTEROLEMIA: ICD-10-CM

## 2019-04-16 DIAGNOSIS — Z12.11 COLON CANCER SCREENING: ICD-10-CM

## 2019-04-16 DIAGNOSIS — I10 ESSENTIAL HYPERTENSION: ICD-10-CM

## 2019-04-16 LAB — HBA1C MFR BLD HPLC: 8.4 %

## 2019-04-16 RX ORDER — INSULIN GLARGINE 100 [IU]/ML
INJECTION, SOLUTION SUBCUTANEOUS
Qty: 45 ML | Refills: 2 | Status: SHIPPED | OUTPATIENT
Start: 2019-04-16 | End: 2020-04-04 | Stop reason: SDUPTHER

## 2019-04-16 RX ORDER — INSULIN GLARGINE 100 [IU]/ML
INJECTION, SOLUTION SUBCUTANEOUS
Qty: 30 ML | Refills: 1 | Status: SHIPPED | OUTPATIENT
Start: 2019-04-16 | End: 2019-10-22 | Stop reason: SDUPTHER

## 2019-04-16 NOTE — PATIENT INSTRUCTIONS

## 2019-04-16 NOTE — PROGRESS NOTES
Assessment/Plan:    *Diagnoses and all orders for this visit:    1. Type 2 diabetes with nephropathy (HCC)  -     AMB POC HEMOGLOBIN A1C  -     insulin glargine (LANTUS SOLOSTAR U-100 INSULIN) 100 unit/mL (3 mL) inpn; INJECT 48 UNITS BENEATH THE SKIN QD  -     CBC WITH AUTOMATED DIFF; Future  -     HEPATIC FUNCTION PANEL; Future  -     LIPID PANEL; Future  -     METABOLIC PANEL, BASIC; Future  -     TSH 3RD GENERATION; Future  -     T4, FREE; Future  -     URINALYSIS W/ RFLX MICROSCOPIC; Future  -     HEMOGLOBIN A1C W/O EAG; Future  -     MICROALBUMIN, UR, RAND W/ MICROALB/CREAT RATIO; Future    2. Pure hypercholesterolemia    3. Essential hypertension    4. Colon cancer screening  -     REFERRAL TO GASTROENTEROLOGY    5. Breast mass  -     US BREAST RT LIMITED=<3 QUAD; Future  -     VANCE MAMMO BI DX INCL CAD; Future    6. Hypovitaminosis D  -     VITAMIN D, 25 HYDROXY; Future    Other orders  -     insulin glargine (LANTUS SOLOSTAR U-100 INSULIN) 100 unit/mL (3 mL) inpn; INJECT 48 UNITS BENEATH THE SKIN QD        Physical in 3 months. Labs prior. The plan was discussed with the patient. The patient verbalized understanding and is in agreement with the plan. All medication potential side effects were discussed with the patient.    -------------------------------------------------------------------------------------------------------------------        Kimberly Junior is a 60 y.o. female and presents with Diabetes and Hypertension         Subjective:  Pt here for f/u. Has been well, no new complaints. DM:  A1c is better, down to 8.4%. HTN:  Well controlled. HLD: stable on prior labs. ROS:  Review of Systems - Negative         The problem list was updated as a part of today's visit.   Patient Active Problem List   Diagnosis Code    HTN (hypertension) I10    Insomnia G47.00    GERD (gastroesophageal reflux disease) K21.9    Schizophrenia ([address] F20.9    DM (diabetes mellitus) ([facility] E11.9    Hyperlipidemia E78.5    Diabetic retinopathy ([facility] E11.319    Cataract H26.9    Glaucoma H40.9    Sleep apnea, obstructive G47.33    Diabetic neuropathy (HCC) E11.40    Heel spur M77.30    Depression F32.9    Type 2 diabetes with nephropathy (HCC) E11.21    Severe obesity (BMI 35.0-39. 9) with comorbidity ([facility] E66.01    Obesity (BMI 30-39. 9) E66.9    CVA, old, cognitive deficits I69.319       The PSH, FH were reviewed. SH:  Social History     Tobacco Use    Smoking status: Current Every Day Smoker     Packs/day: 0.50     Years: 21.00     Pack years: 10.50     Types: Cigarettes    Smokeless tobacco: Former User   Substance Use Topics    Alcohol use: No    Drug use: No       Medications/Allergies:  Current Outpatient Medications on File Prior to Visit   Medication Sig Dispense Refill    QUEtiapine (SEROQUEL) 50 mg tablet TAKE 1 TABLET BY MOUTH EVERY EVENING AS NEEDED 90 Tab 0    Insulin Needles, Disposable, (KENDALL PEN NEEDLE) 32 gauge x 5/32\" ndle USE AS DIRECTED ONCE EVERY  Pen Needle 0    glipiZIDE SR (GLUCOTROL XL) 10 mg CR tablet TAKE 1 TABLET BY MOUTH TWICE DAILY 180 Tab 1    amLODIPine (NORVASC) 10 mg tablet TAKE 1 TABLET BY MOUTH DAILY 90 Tab 0    pantoprazole (PROTONIX) 40 mg tablet TAKE 1 TABLET BY MOUTH EVERY DAY 90 Tab 0    meloxicam (MOBIC) 15 mg tablet TAKE 1 TABLET BY MOUTH EVERY DAY 90 Tab 0    simvastatin (ZOCOR) 10 mg tablet TAKE 1 TABLET BY MOUTH EVERY NIGHT 90 Tab 0    aspirin delayed-release 81 mg tablet Take  by mouth daily.  KENDALL PEN NEEDLE 32 gauge x 5/32\" ndle USE ONCE EVERY DAY AS DIRECTED 100 Pen Needle 1    ALCOHOL PREP PADS padm USE TWICE DAILY AS DIRECTED 200 Pad 1    Ferrous Sulfate (SLOW FE) 47.5 mg iron TbER tablet Take 1 Tab by mouth daily. 90 Tab 1    Blood-Glucose Meter (CONTOUR METER) monitoring kit DX E11.9 1 Kit 0    cholecalciferol, vitamin d3, (VITAMIN D) 1,000 unit tablet Take  by mouth two (2) times a day.       amitriptyline (ELAVIL) 100 mg tablet TAKE 1 TABLET BY MOUTH EVERY NIGHT. GENERIC FOR ELAVIL. 90 Tab 0    meloxicam (MOBIC) 15 mg tablet TAKE 1 TABLET BY MOUTH EVERY DAY 90 Tab 1    glipiZIDE SR (GLUCOTROL XL) 10 mg CR tablet TAKE 1 TABLET BY MOUTH TWICE DAILY 180 Tab 0     No current facility-administered medications on file prior to visit. Allergies   Allergen Reactions    Amaryl [Glimepiride] Nausea Only    Penicillins Nausea and Vomiting         Health Maintenance:   Health Maintenance   Topic Date Due    Hepatitis C Screening  1955    Cologuard Q 3 Years  02/13/2007    Shingrix Vaccine Age 50> (1 of 2) 02/13/2007    PAP AKA CERVICAL CYTOLOGY  03/18/2017    BREAST CANCER SCRN MAMMOGRAM  02/21/2019    FOOT EXAM Q1  05/07/2019    MICROALBUMIN Q1  05/07/2019    LIPID PANEL Q1  05/07/2019    Influenza Age 5 to Adult  10/20/2026 (Originally 8/1/2019)    HEMOGLOBIN A1C Q6M  07/16/2019    MEDICARE YEARLY EXAM  07/17/2019    GLAUCOMA SCREENING Q2Y  09/26/2020    EYE EXAM RETINAL OR DILATED  09/26/2020    Bone Densitometry (Dexa) Screening  02/13/2022    DTaP/Tdap/Td series (2 - Td) 01/13/2027    Pneumococcal 0-64 years  Completed       Objective:  Visit Vitals  /68 (BP 1 Location: Left arm, BP Patient Position: Sitting)   Pulse 94   Temp 98.4 °F (36.9 °C) (Oral)   Resp 16   Ht 5' 5\" (1.651 m)   Wt 210 lb (95.3 kg)   SpO2 96%   BMI 34.95 kg/m²          Nurses notes and VS reviewed.       Physical Examination: General appearance - alert, well appearing, and in no distress  Chest - clear to auscultation, no wheezes, rales or rhonchi, symmetric air entry  Heart - normal rate, regular rhythm, normal S1, S2, no murmurs, rubs, clicks or gallops  Abdomen - soft, nontender, nondistended, no masses or organomegaly          Labwork and Ancillary Studies:    CBC w/Diff  Lab Results   Component Value Date/Time    WBC 9.1 09/11/2018 12:25 PM    HGB 11.9 09/11/2018 12:25 PM    PLATELET 675 42/10/8886 12:25 PM Basic Metabolic Profile  Lab Results   Component Value Date/Time    Sodium 137 09/11/2018 12:25 PM    Potassium 5.1 09/11/2018 12:25 PM    Chloride 97 09/11/2018 12:25 PM    CO2 25 09/11/2018 12:25 PM    Anion gap 8 05/07/2018 01:53 PM    Glucose 206 (H) 09/11/2018 12:25 PM    BUN 21 09/11/2018 12:25 PM    Creatinine 0.90 09/11/2018 12:25 PM    BUN/Creatinine ratio 23 09/11/2018 12:25 PM    GFR est AA 80 09/11/2018 12:25 PM    GFR est non-AA 69 09/11/2018 12:25 PM    Calcium 9.4 09/11/2018 12:25 PM         LFT  Lab Results   Component Value Date/Time    ALT (SGPT) 34 05/07/2018 01:53 PM    AST (SGOT) 19 05/07/2018 01:53 PM    Alk.  phosphatase 119 (H) 05/07/2018 01:53 PM    Bilirubin, direct <0.1 05/07/2018 01:53 PM    Bilirubin, total 0.2 05/07/2018 01:53 PM         Cholesterol  Lab Results   Component Value Date/Time    Cholesterol, total 147 05/07/2018 01:53 PM    HDL Cholesterol 50 05/07/2018 01:53 PM    LDL, calculated 63.8 05/07/2018 01:53 PM    Triglyceride 166 (H) 05/07/2018 01:53 PM    CHOL/HDL Ratio 2.9 05/07/2018 01:53 PM

## 2019-04-16 NOTE — PROGRESS NOTES
Waqas Reyes is a 60 y.o. female (: 1955) presenting to address:    Chief Complaint   Patient presents with    Diabetes    Hypertension       Vitals:    19 1310   BP: 114/68   Pulse: 94   Resp: 16   Temp: 98.4 °F (36.9 °C)   TempSrc: Oral   SpO2: 96%   Weight: 210 lb (95.3 kg)   Height: 5' 5\" (1.651 m)   PainSc:   0 - No pain       Hearing/Vision:   No exam data present    Learning Assessment:     Learning Assessment 3/10/2014   PRIMARY LEARNER Patient   PRIMARY LANGUAGE ENGLISH   LEARNER PREFERENCE PRIMARY OTHER (COMMENT)   ANSWERED BY patient   RELATIONSHIP SELF     Depression Screening:     3 most recent PHQ Screens 2018   [facility] Not Done Patient Decline   Little interest or pleasure in doing things -   Feeling down, depressed, irritable, or hopeless -   Total Score PHQ 2 -     Fall Risk Assessment:     Fall Risk Assessment, last 12 mths 2018   Able to walk? Yes   Fall in past 12 months? No     Abuse Screening:     Abuse Screening Questionnaire 2018   Do you ever feel afraid of your partner? N   Are you in a relationship with someone who physically or mentally threatens you? N   Is it safe for you to go home? Y     Coordination of Care Questionaire:   1. Have you been to the ER, urgent care clinic since your last visit? Hospitalized since your last visit? NO    2. Have you seen or consulted any other health care providers outside of the [address] [place] [address] since your last visit? Include any pap smears or colon screening. NO    Advanced Directive:   1. Do you have an Advanced Directive? NO    2. Would you like information on Advanced Directives?  NO

## 2019-05-16 ENCOUNTER — TELEPHONE (OUTPATIENT)
Dept: FAMILY MEDICINE CLINIC | Age: 64
End: 2019-05-16

## 2019-05-16 NOTE — TELEPHONE ENCOUNTER
Patient called to state that the GI she was referred to does not take her insurance.   Her insurance company provided her with the following practice to be referred to:  [facility] [phone]

## 2019-05-21 NOTE — TELEPHONE ENCOUNTER
Called [facility]. Pt can see Dr. Ren[facility]. p [address]. Referral sent there today.

## 2019-05-22 DIAGNOSIS — N63.0 BREAST MASS: ICD-10-CM

## 2019-06-17 RX ORDER — AMITRIPTYLINE HYDROCHLORIDE 100 MG/1
TABLET, FILM COATED ORAL
Qty: 90 TAB | Refills: 0 | Status: SHIPPED | OUTPATIENT
Start: 2019-06-17 | End: 2019-09-25 | Stop reason: SDUPTHER

## 2019-07-08 RX ORDER — QUETIAPINE FUMARATE 50 MG/1
TABLET, FILM COATED ORAL
Qty: 90 TAB | Refills: 0 | Status: SHIPPED | OUTPATIENT
Start: 2019-07-08 | End: 2019-10-12 | Stop reason: SDUPTHER

## 2019-07-16 DIAGNOSIS — E11.9 DIABETES MELLITUS WITHOUT COMPLICATION (HCC): ICD-10-CM

## 2019-07-17 RX ORDER — GLIPIZIDE 10 MG/1
TABLET, FILM COATED, EXTENDED RELEASE ORAL
Qty: 180 TAB | Refills: 0 | Status: SHIPPED | OUTPATIENT
Start: 2019-07-17 | End: 2019-10-16 | Stop reason: SDUPTHER

## 2019-07-24 DIAGNOSIS — E78.00 PURE HYPERCHOLESTEROLEMIA: ICD-10-CM

## 2019-07-25 RX ORDER — SIMVASTATIN 10 MG/1
TABLET, FILM COATED ORAL
Qty: 90 TAB | Refills: 0 | Status: SHIPPED | OUTPATIENT
Start: 2019-07-25 | End: 2019-10-22 | Stop reason: SDUPTHER

## 2019-08-29 ENCOUNTER — HOSPITAL ENCOUNTER (OUTPATIENT)
Dept: LAB | Age: 64
Discharge: HOME | End: 2019-08-29
Payer: MEDICARE

## 2019-08-29 ENCOUNTER — OFFICE VISIT (OUTPATIENT)
Dept: FAMILY MEDICINE CLINIC | Age: 64
End: 2019-08-29

## 2019-08-29 VITALS
WEIGHT: 211 LBS | TEMPERATURE: 97.4 F | HEIGHT: 65 IN | SYSTOLIC BLOOD PRESSURE: 133 MMHG | BODY MASS INDEX: 35.16 KG/M2 | DIASTOLIC BLOOD PRESSURE: 85 MMHG | RESPIRATION RATE: 16 BRPM | HEART RATE: 93 BPM | OXYGEN SATURATION: 99 %

## 2019-08-29 DIAGNOSIS — I10 ESSENTIAL HYPERTENSION: ICD-10-CM

## 2019-08-29 DIAGNOSIS — E78.00 PURE HYPERCHOLESTEROLEMIA: ICD-10-CM

## 2019-08-29 DIAGNOSIS — E55.9 HYPOVITAMINOSIS D: ICD-10-CM

## 2019-08-29 DIAGNOSIS — E11.21 TYPE 2 DIABETES WITH NEPHROPATHY (HCC): Primary | ICD-10-CM

## 2019-08-29 DIAGNOSIS — E11.21 TYPE 2 DIABETES WITH NEPHROPATHY (HCC): ICD-10-CM

## 2019-08-29 DIAGNOSIS — Z00.00 MEDICARE ANNUAL WELLNESS VISIT, SUBSEQUENT: ICD-10-CM

## 2019-08-29 LAB
25(OH)D3 SERPL-MCNC: 74.9 NG/ML (ref 30–100)
ALBUMIN SERPL-MCNC: 3.7 G/DL (ref 3.4–5)
ALBUMIN/GLOB SERPL: 1.2 {RATIO} (ref 0.8–1.7)
ALP SERPL-CCNC: 116 U/L (ref 45–117)
ALT SERPL-CCNC: 26 U/L (ref 13–56)
ANION GAP SERPL CALC-SCNC: 4 MMOL/L (ref 3–18)
APPEARANCE UR: ABNORMAL
AST SERPL-CCNC: 13 U/L (ref 10–38)
BACTERIA URNS QL MICRO: ABNORMAL /HPF
BASOPHILS # BLD: 0 K/UL (ref 0–0.1)
BASOPHILS NFR BLD: 0 % (ref 0–2)
BILIRUB DIRECT SERPL-MCNC: <0.1 MG/DL (ref 0–0.2)
BILIRUB SERPL-MCNC: 0.2 MG/DL (ref 0.2–1)
BILIRUB UR QL: NEGATIVE
BUN SERPL-MCNC: 18 MG/DL (ref 7–18)
BUN/CREAT SERPL: 17 (ref 12–20)
CALCIUM SERPL-MCNC: 9.4 MG/DL (ref 8.5–10.1)
CHLORIDE SERPL-SCNC: 104 MMOL/L (ref 100–111)
CHOLEST SERPL-MCNC: 148 MG/DL
CO2 SERPL-SCNC: 31 MMOL/L (ref 21–32)
COLOR UR: YELLOW
CREAT SERPL-MCNC: 1.09 MG/DL (ref 0.6–1.3)
CREAT UR-MCNC: 173 MG/DL (ref 30–125)
DIFFERENTIAL METHOD BLD: NORMAL
EOSINOPHIL # BLD: 0.3 K/UL (ref 0–0.4)
EOSINOPHIL NFR BLD: 3 % (ref 0–5)
EPITH CASTS URNS QL MICRO: ABNORMAL /LPF (ref 0–5)
ERYTHROCYTE [DISTWIDTH] IN BLOOD BY AUTOMATED COUNT: 13 % (ref 11.6–14.5)
GLOBULIN SER CALC-MCNC: 3.2 G/DL (ref 2–4)
GLUCOSE SERPL-MCNC: 137 MG/DL (ref 74–99)
GLUCOSE UR STRIP.AUTO-MCNC: NEGATIVE MG/DL
HBA1C MFR BLD: 9.6 % (ref 4.2–5.6)
HCT VFR BLD AUTO: 39.4 % (ref 35–45)
HDLC SERPL-MCNC: 47 MG/DL (ref 40–60)
HDLC SERPL: 3.1 {RATIO} (ref 0–5)
HGB BLD-MCNC: 12.3 G/DL (ref 12–16)
HGB UR QL STRIP: NEGATIVE
KETONES UR QL STRIP.AUTO: NEGATIVE MG/DL
LDLC SERPL CALC-MCNC: 71.8 MG/DL (ref 0–100)
LEUKOCYTE ESTERASE UR QL STRIP.AUTO: ABNORMAL
LIPID PROFILE,FLP: NORMAL
LYMPHOCYTES # BLD: 2.9 K/UL (ref 0.9–3.6)
LYMPHOCYTES NFR BLD: 31 % (ref 21–52)
MCH RBC QN AUTO: 28.7 PG (ref 24–34)
MCHC RBC AUTO-ENTMCNC: 31.2 G/DL (ref 31–37)
MCV RBC AUTO: 91.8 FL (ref 74–97)
MICROALBUMIN UR-MCNC: 2.73 MG/DL (ref 0–3)
MICROALBUMIN/CREAT UR-RTO: 16 MG/G (ref 0–30)
MONOCYTES # BLD: 0.6 K/UL (ref 0.05–1.2)
MONOCYTES NFR BLD: 6 % (ref 3–10)
NEUTS SEG # BLD: 5.4 K/UL (ref 1.8–8)
NEUTS SEG NFR BLD: 60 % (ref 40–73)
NITRITE UR QL STRIP.AUTO: NEGATIVE
PH UR STRIP: 6.5 [PH] (ref 5–8)
PLATELET # BLD AUTO: 364 K/UL (ref 135–420)
PMV BLD AUTO: 9.9 FL (ref 9.2–11.8)
POTASSIUM SERPL-SCNC: 3.9 MMOL/L (ref 3.5–5.5)
PROT SERPL-MCNC: 6.9 G/DL (ref 6.4–8.2)
PROT UR STRIP-MCNC: NEGATIVE MG/DL
RBC # BLD AUTO: 4.29 M/UL (ref 4.2–5.3)
RBC #/AREA URNS HPF: ABNORMAL /HPF (ref 0–5)
SODIUM SERPL-SCNC: 139 MMOL/L (ref 136–145)
SP GR UR REFRACTOMETRY: 1.02 (ref 1–1.03)
T4 FREE SERPL-MCNC: 0.8 NG/DL (ref 0.7–1.5)
TRIGL SERPL-MCNC: 146 MG/DL (ref ?–150)
TSH SERPL DL<=0.05 MIU/L-ACNC: 1.26 UIU/ML (ref 0.36–3.74)
UROBILINOGEN UR QL STRIP.AUTO: 0.2 EU/DL (ref 0.2–1)
VLDLC SERPL CALC-MCNC: 29.2 MG/DL
WBC # BLD AUTO: 9.2 K/UL (ref 4.6–13.2)
WBC URNS QL MICRO: ABNORMAL /HPF (ref 0–4)
YEAST URNS QL MICRO: ABNORMAL

## 2019-08-29 PROCEDURE — 84443 ASSAY THYROID STIM HORMONE: CPT

## 2019-08-29 PROCEDURE — 36415 COLL VENOUS BLD VENIPUNCTURE: CPT

## 2019-08-29 PROCEDURE — 80048 BASIC METABOLIC PNL TOTAL CA: CPT

## 2019-08-29 PROCEDURE — 82306 VITAMIN D 25 HYDROXY: CPT

## 2019-08-29 PROCEDURE — 83036 HEMOGLOBIN GLYCOSYLATED A1C: CPT

## 2019-08-29 PROCEDURE — 80076 HEPATIC FUNCTION PANEL: CPT

## 2019-08-29 PROCEDURE — 80061 LIPID PANEL: CPT

## 2019-08-29 PROCEDURE — 84439 ASSAY OF FREE THYROXINE: CPT

## 2019-08-29 PROCEDURE — 82043 UR ALBUMIN QUANTITATIVE: CPT

## 2019-08-29 PROCEDURE — 85025 COMPLETE CBC W/AUTO DIFF WBC: CPT

## 2019-08-29 PROCEDURE — 81001 URINALYSIS AUTO W/SCOPE: CPT

## 2019-08-29 RX ORDER — BISMUTH SUBSALICYLATE 262 MG
1 TABLET,CHEWABLE ORAL DAILY
COMMUNITY

## 2019-08-29 NOTE — PROGRESS NOTES
Assessment/Plan:    *Diagnoses and all orders for this visit:    1. Type 2 diabetes with nephropathy ([facility]  -      DIABETES FOOT EXAM    2. Medicare annual wellness visit, subsequent    3. Pure hypercholesterolemia    4. Essential hypertension    5. Hypovitaminosis D  -     VITAMIN D, 25 HYDROXY; Future      F/u 4 months. Just A1c. Will contact pt on above results. The plan was discussed with the patient. The patient verbalized understanding and is in agreement with the plan. All medication potential side effects were discussed with the patient.    -------------------------------------------------------------------------------------------------------------------        Mukesh Madrid is a 60 y.o. female and presents with Annual Wellness Visit         Subjective:  Here for f/u. DM:  Will check level today. HTN:  Well controlled. HLD: will repeat level. ROS:  Review of Systems - Negative         The problem list was updated as a part of today's visit. Patient Active Problem List   Diagnosis Code    HTN (hypertension) I10    Insomnia G47.00    GERD (gastroesophageal reflux disease) K21.9    Schizophrenia ([facility] F20.9    DM (diabetes mellitus) ([facility] E11.9    Hyperlipidemia E78.5    Diabetic retinopathy ([facility] E11.319    Cataract H26.9    Glaucoma H40.9    Sleep apnea, obstructive G47.33    Diabetic neuropathy (HCC) E11.40    Heel spur M77.30    Depression F32.9    Type 2 diabetes with nephropathy (HCC) E11.21    Severe obesity (BMI 35.0-39. 9) with comorbidity ([facility] E66.01    Obesity (BMI 30-39. 9) E66.9    CVA, old, cognitive deficits I69.319       The PSH, FH were reviewed.         SH:  Social History     Tobacco Use    Smoking status: Current Every Day Smoker     Packs/day: 0.50     Years: 21.00     Pack years: 10.50     Types: Cigarettes    Smokeless tobacco: Former User   Substance Use Topics    Alcohol use: No    Drug use: No       Medications/Allergies:  Current Outpatient Medications on File Prior to Visit   Medication Sig Dispense Refill    multivitamin (ONE A DAY) tablet Take 1 Tab by mouth daily.  simvastatin (ZOCOR) 10 mg tablet TAKE 1 TABLET BY MOUTH EVERY NIGHT 90 Tab 0    QUEtiapine (SEROQUEL) 50 mg tablet TAKE 1 TABLET BY MOUTH EVERY EVENING AS NEEDED 90 Tab 0    amitriptyline (ELAVIL) 100 mg tablet TAKE 1 TABLET BY MOUTH EVERY NIGHT. GENERIC FOR ELAVIL. 90 Tab 0    pantoprazole (PROTONIX) 40 mg tablet TAKE 1 TABLET BY MOUTH EVERY DAY 90 Tab 1    amLODIPine (NORVASC) 10 mg tablet TAKE 1 TABLET BY MOUTH DAILY 90 Tab 1    insulin glargine (LANTUS SOLOSTAR U-100 INSULIN) 100 unit/mL (3 mL) inpn INJECT 48 UNITS BENEATH THE SKIN QD 30 mL 1    Insulin Needles, Disposable, (KENDALL PEN NEEDLE) 32 gauge x 5/32\" ndle USE AS DIRECTED ONCE EVERY  Pen Needle 0    glipiZIDE SR (GLUCOTROL XL) 10 mg CR tablet TAKE 1 TABLET BY MOUTH TWICE DAILY 180 Tab 0    aspirin delayed-release 81 mg tablet Take  by mouth daily.  ALCOHOL PREP PADS padm USE TWICE DAILY AS DIRECTED 200 Pad 1    Ferrous Sulfate (SLOW FE) 47.5 mg iron TbER tablet Take 1 Tab by mouth daily. 90 Tab 1    Blood-Glucose Meter (CONTOUR METER) monitoring kit DX E11.9 1 Kit 0    cholecalciferol, vitamin d3, (VITAMIN D) 1,000 unit tablet Take  by mouth two (2) times a day.  glipiZIDE SR (GLUCOTROL XL) 10 mg CR tablet TAKE 1 TABLET BY MOUTH TWICE DAILY 180 Tab 0    insulin glargine (LANTUS SOLOSTAR U-100 INSULIN) 100 unit/mL (3 mL) inpn INJECT 48 UNITS BENEATH THE SKIN QD 45 mL 2    meloxicam (MOBIC) 15 mg tablet TAKE 1 TABLET BY MOUTH EVERY DAY 90 Tab 1    meloxicam (MOBIC) 15 mg tablet TAKE 1 TABLET BY MOUTH EVERY DAY 90 Tab 0    KENDALL PEN NEEDLE 32 gauge x 5/32\" ndle USE ONCE EVERY DAY AS DIRECTED 100 Pen Needle 1     No current facility-administered medications on file prior to visit.          Allergies   Allergen Reactions    Amaryl [Glimepiride] Nausea Only    Penicillins Nausea and Vomiting         Health Maintenance:   Health Maintenance   Topic Date Due    Hepatitis C Screening  1955    Cologuard Q 3 Years  02/13/2007    Shingrix Vaccine Age 50> (1 of 2) 02/13/2007    PAP AKA CERVICAL CYTOLOGY  03/18/2017    MICROALBUMIN Q1  05/07/2019    LIPID PANEL Q1  05/07/2019    BREAST CANCER SCRN MAMMOGRAM  11/10/2019    Influenza Age 5 to Adult  10/20/2026 (Originally 8/1/2019)    HEMOGLOBIN A1C Q6M  10/16/2019    MEDICARE YEARLY EXAM  08/29/2020    FOOT EXAM Q1  08/29/2020    GLAUCOMA SCREENING Q2Y  09/26/2020    EYE EXAM RETINAL OR DILATED  09/26/2020    Bone Densitometry (Dexa) Screening  02/13/2022    DTaP/Tdap/Td series (2 - Td) 01/13/2027    Pneumococcal 0-64 years  Completed       Objective:  Visit Vitals  /85   Pulse 93   Temp 97.4 °F (36.3 °C) (Oral)   Resp 16   Ht 5' 5\" (1.651 m)   Wt 211 lb (95.7 kg)   SpO2 99%   BMI 35.11 kg/m²          Nurses notes and VS reviewed.       Physical Examination: General appearance - alert, well appearing, and in no distress  Ears - bilateral TM's and external ear canals normal  Mouth - mucous membranes moist, pharynx normal without lesions  Neck - supple, no significant adenopathy  Chest - clear to auscultation, no wheezes, rales or rhonchi, symmetric air entry  Heart - normal rate, regular rhythm, normal S1, S2, no murmurs, rubs, clicks or gallops  Abdomen - soft, nontender, nondistended, no masses or organomegaly  Musculoskeletal - no joint tenderness, deformity or swelling  Extremities - peripheral pulses normal, no pedal edema, no clubbing or cyanosis          Labwork and Ancillary Studies:    CBC w/Diff  Lab Results   Component Value Date/Time    WBC 9.1 09/11/2018 12:25 PM    HGB 11.9 09/11/2018 12:25 PM    PLATELET 423 74/55/0235 12:25 PM         Basic Metabolic Profile  Lab Results   Component Value Date/Time    Sodium 137 09/11/2018 12:25 PM    Potassium 5.1 09/11/2018 12:25 PM    Chloride 97 09/11/2018 12:25 PM    CO2 25 09/11/2018 12:25 PM    Anion gap 8 05/07/2018 01:53 PM    Glucose 206 (H) 09/11/2018 12:25 PM    BUN 21 09/11/2018 12:25 PM    Creatinine 0.90 09/11/2018 12:25 PM    BUN/Creatinine ratio 23 09/11/2018 12:25 PM    GFR est AA 80 09/11/2018 12:25 PM    GFR est non-AA 69 09/11/2018 12:25 PM    Calcium 9.4 09/11/2018 12:25 PM         LFT  Lab Results   Component Value Date/Time    ALT (SGPT) 34 05/07/2018 01:53 PM    AST (SGOT) 19 05/07/2018 01:53 PM    Alk.  phosphatase 119 (H) 05/07/2018 01:53 PM    Bilirubin, direct <0.1 05/07/2018 01:53 PM    Bilirubin, total 0.2 05/07/2018 01:53 PM         Cholesterol  Lab Results   Component Value Date/Time    Cholesterol, total 147 05/07/2018 01:53 PM    HDL Cholesterol 50 05/07/2018 01:53 PM    LDL, calculated 63.8 05/07/2018 01:53 PM    Triglyceride 166 (H) 05/07/2018 01:53 PM    CHOL/HDL Ratio 2.9 05/07/2018 01:53 PM

## 2019-08-29 NOTE — PROGRESS NOTES
Sneha Eduardo is a 60 y.o. female (: 1955) presenting to address:    Chief Complaint   Patient presents with    Annual Wellness Visit       Vitals:    19 1101   BP: 133/85   Pulse: 93   Resp: 16   Temp: 97.4 °F (36.3 °C)   TempSrc: Oral   SpO2: 99%   Weight: 211 lb (95.7 kg)   Height: 5' 5\" (1.651 m)   PainSc:   0 - No pain       Hearing/Vision:   No exam data present    Learning Assessment:     Learning Assessment 3/10/2014   PRIMARY LEARNER Patient   PRIMARY LANGUAGE ENGLISH   LEARNER PREFERENCE PRIMARY OTHER (COMMENT)   ANSWERED BY patient   RELATIONSHIP SELF     Depression Screening:     3 most recent PHQ Screens 2018   [facility] Not Done Patient Decline   Little interest or pleasure in doing things -   Feeling down, depressed, irritable, or hopeless -   Total Score PHQ 2 -     Fall Risk Assessment:     Fall Risk Assessment, last 12 mths 2019   Able to walk? Yes   Fall in past 12 months? No     Abuse Screening:     Abuse Screening Questionnaire 2019   Do you ever feel afraid of your partner? N   Are you in a relationship with someone who physically or mentally threatens you? N   Is it safe for you to go home? Y     Coordination of Care Questionaire:   1. Have you been to the ER, urgent care clinic since your last visit? Hospitalized since your last visit? NO    2. Have you seen or consulted any other health care providers outside of the [address] [place] [address] since your last visit? Include any pap smears or colon screening. NO    Advanced Directive:   1. Do you have an Advanced Directive? NO    2. Would you like information on Advanced Directives?  NO

## 2019-08-29 NOTE — PROGRESS NOTES
This is the Subsequent Medicare Annual Wellness Exam, performed 12 months or more after the Initial AWV or the last Subsequent AWV    I have reviewed the patient's medical history in detail and updated the computerized patient record. History     Past Medical History:   Diagnosis Date    Cataract     CVA, old, cognitive deficits     Depression 1/13/2015    Diabetes ([address]     Diabetic neuropathy ([address] 9/1/2013    Diabetic retinopathy ([address] 3/2/2012    DM (diabetes mellitus) ([address] 3/2/2012    GERD (gastroesophageal reflux disease) 4/21/2011    Glaucoma     Heel spur 11/24/2014    HTN (hypertension) 4/21/2011    Hyperlipidemia 3/2/2012    Insomnia 4/21/2011    Kidney stone     Obesity (BMI 30-39. 9) 7/16/2018    Retained ureteral stent     Schizophrenia ([address] 4/21/2011    Sleep apnea, obstructive 9/1/2013    Stroke ([address] 08/2018    pt states her Neurologist diagnosed her with stroke in August    Urinary frequency       Past Surgical History:   Procedure Laterality Date    BIOPSY OF BREAST, INCISIONAL  1970    left benign    HX OPEN CHOLECYSTECTOMY      HX UROLOGICAL Left 09/2017    Cystoscopy, Left retrograde pyelography, Left ureteroscopic stone extraction with laser lithotripsy and basket stone extraction,Left 6 x 24 cm double-J ureteral stent placement, Interpretation of urography. [address] - Dr Miguelito Giordano      Current Outpatient Medications   Medication Sig Dispense Refill    multivitamin (ONE A DAY) tablet Take 1 Tab by mouth daily.  simvastatin (ZOCOR) 10 mg tablet TAKE 1 TABLET BY MOUTH EVERY NIGHT 90 Tab 0    QUEtiapine (SEROQUEL) 50 mg tablet TAKE 1 TABLET BY MOUTH EVERY EVENING AS NEEDED 90 Tab 0    amitriptyline (ELAVIL) 100 mg tablet TAKE 1 TABLET BY MOUTH EVERY NIGHT. GENERIC FOR ELAVIL.  90 Tab 0    pantoprazole (PROTONIX) 40 mg tablet TAKE 1 TABLET BY MOUTH EVERY DAY 90 Tab 1    amLODIPine (NORVASC) 10 mg tablet TAKE 1 TABLET BY MOUTH DAILY 90 Tab 1    insulin glargine (LANTUS SOLOSTAR U-100 INSULIN) 100 unit/mL (3 mL) inpn INJECT 48 UNITS BENEATH THE SKIN QD 30 mL 1    Insulin Needles, Disposable, (KENDALL PEN NEEDLE) 32 gauge x 5/32\" ndle USE AS DIRECTED ONCE EVERY  Pen Needle 0    glipiZIDE SR (GLUCOTROL XL) 10 mg CR tablet TAKE 1 TABLET BY MOUTH TWICE DAILY 180 Tab 0    aspirin delayed-release 81 mg tablet Take  by mouth daily.  ALCOHOL PREP PADS padm USE TWICE DAILY AS DIRECTED 200 Pad 1    Ferrous Sulfate (SLOW FE) 47.5 mg iron TbER tablet Take 1 Tab by mouth daily. 90 Tab 1    Blood-Glucose Meter (CONTOUR METER) monitoring kit DX E11.9 1 Kit 0    cholecalciferol, vitamin d3, (VITAMIN D) 1,000 unit tablet Take  by mouth two (2) times a day.       glipiZIDE SR (GLUCOTROL XL) 10 mg CR tablet TAKE 1 TABLET BY MOUTH TWICE DAILY 180 Tab 0    insulin glargine (LANTUS SOLOSTAR U-100 INSULIN) 100 unit/mL (3 mL) inpn INJECT 48 UNITS BENEATH THE SKIN QD 45 mL 2    meloxicam (MOBIC) 15 mg tablet TAKE 1 TABLET BY MOUTH EVERY DAY 90 Tab 1    meloxicam (MOBIC) 15 mg tablet TAKE 1 TABLET BY MOUTH EVERY DAY 90 Tab 0    KENDALL PEN NEEDLE 32 gauge x 5/32\" ndle USE ONCE EVERY DAY AS DIRECTED 100 Pen Needle 1     Allergies   Allergen Reactions    Amaryl [Glimepiride] Nausea Only    Penicillins Nausea and Vomiting     Family History   Family history unknown: Yes     Social History     Tobacco Use    Smoking status: Current Every Day Smoker     Packs/day: 0.50     Years: 21.00     Pack years: 10.50     Types: Cigarettes    Smokeless tobacco: Former User   Substance Use Topics    Alcohol use: No     Patient Active Problem List   Diagnosis Code    HTN (hypertension) I10    Insomnia G47.00    GERD (gastroesophageal reflux disease) K21.9    Schizophrenia (HCC) F20.9    DM (diabetes mellitus) ([facility] E11.9    Hyperlipidemia E78.5    [facility] E11.319    Cataract H26.9    Glaucoma H40.9    Sleep apnea, obstructive G47.33    Diabetic neuropathy (HCC) E11.40    Heel spur M77.30    Depression F32.9    Type 2 diabetes with nephropathy (HCC) E11.21    Severe obesity (BMI 35.0-39. 9) with comorbidity ([address] E66.01    Obesity (BMI 30-39. 9) E66.9    CVA, old, cognitive deficits I69.319       Depression Risk Factor Screening:     3 most recent PHQ Screens 7/16/2018   PHQ Not Done Patient Decline   Little interest or pleasure in doing things -   Feeling down, depressed, irritable, or hopeless -   Total Score PHQ 2 -     Alcohol Risk Factor Screening: You do not drink alcohol or very rarely. Functional Ability and Level of Safety:   Hearing Loss  Hearing is good. Activities of Daily Living  The home contains: no safety equipment. Patient does total self care    Fall Risk  Fall Risk Assessment, last 12 mths 8/29/2019   Able to walk? Yes   Fall in past 12 months? No       Abuse Screen  Patient is not abused    Cognitive Screening   Evaluation of Cognitive Function:  Has your family/caregiver stated any concerns about your memory: no  Normal    Patient Care Team   Patient Care Team:  Dmitry Huertas MD as PCP - General (Internal Medicine)    Assessment/Plan   Education and counseling provided:  Are appropriate based on today's review and evaluation    Diagnoses and all orders for this visit:    1. Medicare annual wellness visit, subsequent    2. Type 2 diabetes with nephropathy (HCC)    3. Pure hypercholesterolemia    4. Essential hypertension    5.  Hypovitaminosis D  -     VITAMIN D, 25 HYDROXY; Future        Health Maintenance Due   Topic Date Due    Hepatitis C Screening  1955    Cologuard Q 3 Years  02/13/2007    Shingrix Vaccine Age 50> (1 of 2) 02/13/2007    PAP AKA CERVICAL CYTOLOGY  03/18/2017    FOOT EXAM Q1  05/07/2019    MICROALBUMIN Q1  05/07/2019    LIPID PANEL Q1  05/07/2019    MEDICARE YEARLY EXAM  07/17/2019    BREAST CANCER SCRN MAMMOGRAM  11/10/2019

## 2019-08-29 NOTE — PATIENT INSTRUCTIONS
Medicare Wellness Visit, Female     The best way to live healthy is to have a lifestyle where you eat a well-balanced diet, exercise regularly, limit alcohol use, and quit all forms of tobacco/nicotine, if applicable. Regular preventive services are another way to keep healthy. Preventive services (vaccines, screening tests, monitoring & exams) can help personalize your care plan, which helps you manage your own care. Screening tests can find health problems at the earliest stages, when they are easiest to treat. [facility] follows the current, evidence-based guidelines published by the [facility] Adelina ([facility]) when recommending preventive services for our patients. Because we follow these guidelines, sometimes recommendations change over time as research supports it. (For example, mammograms used to be recommended annually. Even though Medicare will still pay for an annual mammogram, the newer guidelines recommend a mammogram every two years for women of average risk.)  Of course, you and your doctor may decide to screen more often for some diseases, based on your risk and your health status. Preventive services for you include:  - Medicare offers their members a free annual wellness visit, which is time for you and your primary care provider to discuss and plan for your preventive service needs. Take advantage of this benefit every year!  -All adults over the age of 72 should receive the recommended pneumonia vaccines. Current USPSTF guidelines recommend a series of two vaccines for the best pneumonia protection.   -All adults should have a flu vaccine yearly and a tetanus vaccine every 10 years. All adults age 61 and older should receive a shingles vaccine once in their lifetime.    -A bone mass density test is recommended when a woman turns 65 to screen for osteoporosis. This test is only recommended one time, as a screening.  Some providers will use this same test as a disease monitoring tool if you already have osteoporosis. -All adults age 38-68 who are overweight should have a diabetes screening test once every three years.   -Other screening tests and preventive services for persons with diabetes include: an eye exam to screen for diabetic retinopathy, a kidney function test, a foot exam, and stricter control over your cholesterol.   -Cardiovascular screening for adults with routine risk involves an electrocardiogram (ECG) at intervals determined by your doctor.   -Colorectal cancer screenings should be done for adults age 54-65 with no increased risk factors for colorectal cancer. There are a number of acceptable methods of screening for this type of cancer. Each test has its own benefits and drawbacks. Discuss with your doctor what is most appropriate for you during your annual wellness visit. The different tests include: colonoscopy (considered the best screening method), a fecal occult blood test, a fecal DNA test, and sigmoidoscopy. -Breast cancer screenings are recommended every other year for women of normal risk, age 54-69.  -Cervical cancer screenings for women over age 72 are only recommended with certain risk factors.   -All adults born [facility] should be screened once for Hepatitis C.      Here is a list of your current Health Maintenance items (your personalized list of preventive services) with a due date:  Health Maintenance Due   Topic Date Due    Hepatitis C Test  1955    Stool testing FIT-DNA  02/13/2007    Shingles Vaccine (1 of 2) 02/13/2007    Pap Test  03/18/2017    Diabetic Foot Care  05/07/2019    Albumin Urine Test  05/07/2019    Cholesterol Test   05/07/2019    Annual Well Visit  07/17/2019    Mammogram  11/10/2019

## 2019-09-04 NOTE — PROGRESS NOTES
A1c still high, in fact it is higher than last time. I want her to increase to 55 units on Lantus. Sh needs to watch her diet more carefully. Rest of labs are fine.

## 2019-10-16 DIAGNOSIS — E11.9 DIABETES MELLITUS WITHOUT COMPLICATION (HCC): ICD-10-CM

## 2019-10-16 RX ORDER — GLIPIZIDE 10 MG/1
TABLET, FILM COATED, EXTENDED RELEASE ORAL
Qty: 180 TAB | Refills: 0 | Status: SHIPPED | OUTPATIENT
Start: 2019-10-16 | End: 2020-01-21

## 2019-10-22 NOTE — TELEPHONE ENCOUNTER
Requested Prescriptions     Pending Prescriptions Disp Refills    insulin glargine (LANTUS SOLOSTAR U-100 INSULIN) 100 unit/mL (3 mL) inpn 30 mL 1     Sig: INJECT 48 UNITS BENEATH THE SKIN QD     Patient calling to request refill on:      Remaining pills:  Last appt:  Next appt: Future Appointments   Date Time Provider Kapil Sky   1/7/2020  1:00 PM Latrell Mitchell MD [address] [place] [address],Third Floor:    [place]  Patient aware of 72 hour time frame.

## 2019-10-23 RX ORDER — INSULIN GLARGINE 100 [IU]/ML
INJECTION, SOLUTION SUBCUTANEOUS
Qty: 30 ML | Refills: 1 | Status: SHIPPED | OUTPATIENT
Start: 2019-10-23 | End: 2020-03-16 | Stop reason: SDUPTHER

## 2019-10-23 NOTE — TELEPHONE ENCOUNTER
Anthonyt last ordered 04/16/19 qty 30mL with 1 refill.       Last appt 08/29/19  Next scheduled 01/07/20

## 2020-02-18 ENCOUNTER — TELEPHONE (OUTPATIENT)
Dept: FAMILY MEDICINE CLINIC | Age: 65
End: 2020-02-18

## 2020-02-18 NOTE — TELEPHONE ENCOUNTER
Pt called to report that she is having a reaction to the simvastatin medication. It causes her to have pain in her stomach and feel like she is throwing up. Her pharmacy advised her to stop taking the medication and contact us and that is what she is doing.  Please advise

## 2020-02-18 NOTE — TELEPHONE ENCOUNTER
Spoke to Dr. London Barnes regarding the patients concerns. Dr. London Barnes stated that she did not feel that the stomach pain is being caused by the simvastatin, but the patient can hold the medication for right now until she can come in for an appointment and it can be discussed. Notified the patient of Dr. Reddy Ax recommendations, and she verbalized understanding.  Scheduled the patient for Monday, 03/16/2020 at 1130am.

## 2020-03-16 ENCOUNTER — OFFICE VISIT (OUTPATIENT)
Dept: FAMILY MEDICINE CLINIC | Age: 65
End: 2020-03-16

## 2020-03-16 VITALS
BODY MASS INDEX: 35.65 KG/M2 | SYSTOLIC BLOOD PRESSURE: 123 MMHG | HEART RATE: 88 BPM | HEIGHT: 65 IN | TEMPERATURE: 97.8 F | OXYGEN SATURATION: 97 % | DIASTOLIC BLOOD PRESSURE: 80 MMHG | RESPIRATION RATE: 16 BRPM | WEIGHT: 214 LBS

## 2020-03-16 DIAGNOSIS — Z79.4 TYPE 2 DIABETES MELLITUS WITH DIABETIC POLYNEUROPATHY, WITH LONG-TERM CURRENT USE OF INSULIN (HCC): Primary | ICD-10-CM

## 2020-03-16 DIAGNOSIS — I10 ESSENTIAL HYPERTENSION: ICD-10-CM

## 2020-03-16 DIAGNOSIS — Z12.11 COLON CANCER SCREENING: ICD-10-CM

## 2020-03-16 DIAGNOSIS — E78.00 PURE HYPERCHOLESTEROLEMIA: ICD-10-CM

## 2020-03-16 DIAGNOSIS — E11.42 TYPE 2 DIABETES MELLITUS WITH DIABETIC POLYNEUROPATHY, WITH LONG-TERM CURRENT USE OF INSULIN (HCC): Primary | ICD-10-CM

## 2020-03-16 LAB — HBA1C MFR BLD HPLC: 8.4 %

## 2020-03-16 NOTE — PROGRESS NOTES
Assessment/Plan:    *Diagnoses and all orders for this visit:    1. Type 2 diabetes mellitus with diabetic polyneuropathy, with long-term current use of insulin (HCC)  -     AMB POC HEMOGLOBIN A1C    2. Colon cancer screening  -     COLOGUARD TEST (FECAL DNA COLORECTAL CANCER SCREENING)    3. Essential hypertension    4. Pure hypercholesterolemia        F/u 3 months. Check A1c and weight. She will work on getting both of these down. The plan was discussed with the patient. The patient verbalized understanding and is in agreement with the plan. All medication potential side effects were discussed with the patient.    -------------------------------------------------------------------------------------------------------------------        Kelly Patiño is a 63 y.o. female and presents with Diabetes and Cholesterol Problem         Subjective:  Pt here for f/u. DM:  A1c is better, was 9.6 now 8.4%. She admits that she has not been good with her diet. She is aware that her weight needs to come down. HTN:  At goal.    HLD:  Stable, we started her on Zocor. She still has not had her colonoscopy. She voices to me that she is concerned about the prep and her inability to follow it properly. Review of Systems - General ROS: negative         The problem list was updated as a part of today's visit. Patient Active Problem List   Diagnosis Code    HTN (hypertension) I10    Insomnia G47.00    GERD (gastroesophageal reflux disease) K21.9    Schizophrenia ([facility] F20.9    DM (diabetes mellitus) ([facility] E11.9    Hyperlipidemia E78.5    [facility] E11.319    Cataract H26.9    Glaucoma H40.9    Sleep apnea, obstructive G47.33    Diabetic neuropathy (HCC) E11.40    Heel spur M77.30    Depression F32.9    Type 2 diabetes with nephropathy (HCC) E11.21    Severe obesity (BMI 35.0-39. 9) with comorbidity ([facility] E66.01    Obesity (BMI 30-39. 9) E66.9    CVA, old, cognitive deficits I69.319       The PS,  were reviewed. SH:  Social History     Tobacco Use    Smoking status: Current Every Day Smoker     Packs/day: 0.50     Years: 21.00     Pack years: 10.50     Types: Cigarettes    Smokeless tobacco: Former User   Substance Use Topics    Alcohol use: No    Drug use: No       Medications/Allergies:  Current Outpatient Medications on File Prior to Visit   Medication Sig Dispense Refill    meloxicam (MOBIC) 15 mg tablet TAKE 1 TABLET BY MOUTH EVERY DAY 90 Tab 1    pantoprazole (PROTONIX) 40 mg tablet TAKE 1 TABLET BY MOUTH EVERY DAY 90 Tab 1    amLODIPine (NORVASC) 10 mg tablet TAKE 1 TABLET BY MOUTH DAILY 90 Tab 1    simvastatin (ZOCOR) 10 mg tablet TAKE 1 TABLET BY MOUTH EVERY NIGHT 90 Tab 1    QUEtiapine (SEROQUEL) 50 mg tablet TAKE 1 TABLET BY MOUTH EVERY EVENING AS NEEDED 90 Tab 1    amitriptyline (ELAVIL) 100 mg tablet TAKE 1 TABLET BY MOUTH EVERY NIGHT. GENERIC FOR ELAVIL. 90 Tab 1    Insulin Needles, Disposable, (KENDALL PEN NEEDLE) 32 gauge x 5/32\" ndle USE AS DIRECTED ONCE A  Pen Needle 1    multivitamin (ONE A DAY) tablet Take 1 Tab by mouth daily.  insulin glargine (LANTUS SOLOSTAR U-100 INSULIN) 100 unit/mL (3 mL) inpn INJECT 48 UNITS BENEATH THE SKIN QD 45 mL 2    meloxicam (MOBIC) 15 mg tablet TAKE 1 TABLET BY MOUTH EVERY DAY 90 Tab 1    glipiZIDE SR (GLUCOTROL XL) 10 mg CR tablet TAKE 1 TABLET BY MOUTH TWICE DAILY 180 Tab 0    aspirin delayed-release 81 mg tablet Take  by mouth daily.  KENDALL PEN NEEDLE 32 gauge x 5/32\" ndle USE ONCE EVERY DAY AS DIRECTED 100 Pen Needle 1    ALCOHOL PREP PADS padm USE TWICE DAILY AS DIRECTED 200 Pad 1    Ferrous Sulfate (SLOW FE) 47.5 mg iron TbER tablet Take 1 Tab by mouth daily. 90 Tab 1    Blood-Glucose Meter (CONTOUR METER) monitoring kit DX E11.9 1 Kit 0    cholecalciferol, vitamin d3, (VITAMIN D) 1,000 unit tablet Take  by mouth two (2) times a day.       [DISCONTINUED] glipiZIDE SR (GLUCOTROL XL) 10 mg CR tablet TAKE 1 TABLET BY MOUTH TWICE DAILY 180 Tab 0    [DISCONTINUED] insulin glargine (LANTUS SOLOSTAR U-100 INSULIN) 100 unit/mL (3 mL) inpn INJECT 48 UNITS BENEATH THE SKIN QD (Patient taking differently: INJECT 55 UNITS BENEATH THE SKIN QD) 30 mL 1    [DISCONTINUED] Insulin Needles, Disposable, (KENDALL PEN NEEDLE) 32 gauge x 5/32\" ndle USE AS DIRECTED ONCE EVERY  Pen Needle 0     No current facility-administered medications on file prior to visit. Allergies   Allergen Reactions    Amaryl [Glimepiride] Nausea Only    Penicillins Nausea and Vomiting         Health Maintenance:   Health Maintenance   Topic Date Due    Hepatitis C Screening  1955    Cologuard Q3Y  02/13/2007    Shingrix Vaccine Age 50> (1 of 2) 02/13/2007    PAP AKA CERVICAL CYTOLOGY  03/18/2017    Influenza Age 9 to Adult  10/20/2026 (Originally 8/1/2019)    Medicare Yearly Exam  08/29/2020    Foot Exam Q1  08/29/2020    A1C test (Diabetic or Prediabetic)  08/29/2020    MICROALBUMIN Q1  08/29/2020    Lipid Screen  08/29/2020    GLAUCOMA SCREENING Q2Y  09/26/2020    Eye Exam Retinal or Dilated  09/26/2020    Breast Cancer Screen Mammogram  05/10/2021    Bone Densitometry (Dexa) Screening  02/13/2022    DTaP/Tdap/Td series (2 - Td) 01/13/2027    Pneumococcal 0-64 years  Completed       Objective:  Visit Vitals  /80   Pulse 88   Temp 97.8 °F (36.6 °C) (Oral)   Resp 16   Ht 5' 5\" (1.651 m)   Wt 214 lb (97.1 kg)   SpO2 97%   BMI 35.61 kg/m²          Nurses notes and VS reviewed.       Physical Examination: General appearance - alert, well appearing, and in no distress  Chest - clear to auscultation, no wheezes, rales or rhonchi, symmetric air entry  Heart - normal rate, regular rhythm, normal S1, S2, no murmurs, rubs, clicks or gallops  Extremities - peripheral pulses normal, no pedal edema, no clubbing or cyanosis          Labwork and Ancillary Studies:    CBC w/Diff  Lab Results   Component Value Date/Time    WBC 9.2 08/29/2019 11:38 AM    HGB 12.3 08/29/2019 11:38 AM    PLATELET 306 94/20/0287 11:38 AM         Basic Metabolic Profile  Lab Results   Component Value Date/Time    Sodium 139 08/29/2019 11:38 AM    Potassium 3.9 08/29/2019 11:38 AM    Chloride 104 08/29/2019 11:38 AM    CO2 31 08/29/2019 11:38 AM    Anion gap 4 08/29/2019 11:38 AM    Glucose 137 (H) 08/29/2019 11:38 AM    BUN 18 08/29/2019 11:38 AM    Creatinine 1.09 08/29/2019 11:38 AM    BUN/Creatinine ratio 17 08/29/2019 11:38 AM    GFR est AA >60 08/29/2019 11:38 AM    GFR est non-AA 51 (L) 08/29/2019 11:38 AM    Calcium 9.4 08/29/2019 11:38 AM         LFT  Lab Results   Component Value Date/Time    ALT (SGPT) 26 08/29/2019 11:38 AM    AST (SGOT) 13 08/29/2019 11:38 AM    Alk.  phosphatase 116 08/29/2019 11:38 AM    Bilirubin, direct <0.1 08/29/2019 11:38 AM    Bilirubin, total 0.2 08/29/2019 11:38 AM         Cholesterol  Lab Results   Component Value Date/Time    Cholesterol, total 148 08/29/2019 11:38 AM    HDL Cholesterol 47 08/29/2019 11:38 AM    LDL, calculated 71.8 08/29/2019 11:38 AM    Triglyceride 146 08/29/2019 11:38 AM    CHOL/HDL Ratio 3.1 08/29/2019 11:38 AM

## 2020-03-16 NOTE — PATIENT INSTRUCTIONS

## 2020-03-16 NOTE — PROGRESS NOTES
Edda Quinn is a 63 y.o. female (: 1955) presenting to address:    Chief Complaint   Patient presents with    Diabetes    Cholesterol Problem       Vitals:    20 1129   BP: 123/80   Pulse: 88   Resp: 16   Temp: 97.8 °F (36.6 °C)   TempSrc: Oral   SpO2: 97%   Weight: 214 lb (97.1 kg)   Height: 5' 5\" (1.651 m)   PainSc:   0 - No pain       Hearing/Vision:   No exam data present    Learning Assessment:     Learning Assessment 3/10/2014   PRIMARY LEARNER Patient   PRIMARY LANGUAGE ENGLISH   LEARNER PREFERENCE PRIMARY OTHER (COMMENT)   ANSWERED BY patient   RELATIONSHIP SELF     Depression Screening:     3 most recent PHQ Screens 2018   [facility] Not Done Patient Decline   Little interest or pleasure in doing things -   Feeling down, depressed, irritable, or hopeless -   Total Score PHQ 2 -     Fall Risk Assessment:     Fall Risk Assessment, last 12 mths 2019   Able to walk? Yes   Fall in past 12 months? No     Abuse Screening:     Abuse Screening Questionnaire 2019   Do you ever feel afraid of your partner? N   Are you in a relationship with someone who physically or mentally threatens you? N   Is it safe for you to go home? Y     Coordination of Care Questionaire:   1. Have you been to the ER, urgent care clinic since your last visit? Hospitalized since your last visit? NO    2. Have you seen or consulted any other health care providers outside of the [address] [place] [address] since your last visit? Include any pap smears or colon screening. NO    Advanced Directive:   1. Do you have an Advanced Directive? NO    2. Would you like information on Advanced Directives?  NO

## 2020-03-25 RX ORDER — AMITRIPTYLINE HYDROCHLORIDE 100 MG/1
TABLET, FILM COATED ORAL
Qty: 90 TAB | Refills: 1 | Status: SHIPPED | OUTPATIENT
Start: 2020-03-25 | End: 2020-09-28 | Stop reason: SDUPTHER

## 2020-04-03 DIAGNOSIS — E11.21 TYPE 2 DIABETES WITH NEPHROPATHY (HCC): ICD-10-CM

## 2020-04-04 RX ORDER — INSULIN GLARGINE 100 [IU]/ML
INJECTION, SOLUTION SUBCUTANEOUS
Qty: 45 ML | Refills: 2 | Status: SHIPPED | OUTPATIENT
Start: 2020-04-04 | End: 2020-06-16 | Stop reason: DRUGHIGH

## 2020-04-17 DIAGNOSIS — E78.00 PURE HYPERCHOLESTEROLEMIA: ICD-10-CM

## 2020-04-17 RX ORDER — SIMVASTATIN 10 MG/1
TABLET, FILM COATED ORAL
Qty: 90 TAB | Refills: 1 | Status: SHIPPED | OUTPATIENT
Start: 2020-04-17 | End: 2020-06-16 | Stop reason: SDUPTHER

## 2020-04-26 DIAGNOSIS — I10 ESSENTIAL HYPERTENSION: ICD-10-CM

## 2020-04-27 RX ORDER — AMLODIPINE BESYLATE 10 MG/1
TABLET ORAL
Qty: 90 TAB | Refills: 0 | Status: SHIPPED | OUTPATIENT
Start: 2020-04-27 | End: 2020-07-26

## 2020-05-25 RX ORDER — PANTOPRAZOLE SODIUM 40 MG/1
TABLET, DELAYED RELEASE ORAL
Qty: 90 TAB | Refills: 1 | Status: SHIPPED | OUTPATIENT
Start: 2020-05-25 | End: 2020-11-30 | Stop reason: SDUPTHER

## 2020-05-25 RX ORDER — MELOXICAM 15 MG/1
TABLET ORAL
Qty: 90 TAB | Refills: 1 | Status: SHIPPED | OUTPATIENT
Start: 2020-05-25 | End: 2020-09-16 | Stop reason: ALTCHOICE

## 2020-06-16 ENCOUNTER — OFFICE VISIT (OUTPATIENT)
Dept: FAMILY MEDICINE CLINIC | Age: 65
End: 2020-06-16

## 2020-06-16 VITALS
BODY MASS INDEX: 35.49 KG/M2 | TEMPERATURE: 98.3 F | HEART RATE: 99 BPM | RESPIRATION RATE: 16 BRPM | OXYGEN SATURATION: 99 % | DIASTOLIC BLOOD PRESSURE: 80 MMHG | HEIGHT: 65 IN | WEIGHT: 213 LBS | SYSTOLIC BLOOD PRESSURE: 128 MMHG

## 2020-06-16 DIAGNOSIS — E78.00 PURE HYPERCHOLESTEROLEMIA: ICD-10-CM

## 2020-06-16 DIAGNOSIS — Z79.4 TYPE 2 DIABETES MELLITUS WITH DIABETIC POLYNEUROPATHY, WITH LONG-TERM CURRENT USE OF INSULIN (HCC): Primary | ICD-10-CM

## 2020-06-16 DIAGNOSIS — I10 ESSENTIAL HYPERTENSION: ICD-10-CM

## 2020-06-16 DIAGNOSIS — E11.42 TYPE 2 DIABETES MELLITUS WITH DIABETIC POLYNEUROPATHY, WITH LONG-TERM CURRENT USE OF INSULIN (HCC): Primary | ICD-10-CM

## 2020-06-16 LAB — HBA1C MFR BLD HPLC: 9.3 %

## 2020-06-16 RX ORDER — ATORVASTATIN CALCIUM 10 MG/1
10 TABLET, FILM COATED ORAL DAILY
Qty: 90 TAB | Refills: 1 | Status: SHIPPED | OUTPATIENT
Start: 2020-06-16 | End: 2020-09-16 | Stop reason: ALTCHOICE

## 2020-06-16 RX ORDER — INSULIN GLARGINE 100 [IU]/ML
38 INJECTION, SOLUTION SUBCUTANEOUS DAILY
COMMUNITY
End: 2020-11-05 | Stop reason: SDUPTHER

## 2020-06-16 NOTE — PATIENT INSTRUCTIONS

## 2020-06-16 NOTE — PROGRESS NOTES
Stephanie Case is a 63 y.o. female (: 1955) presenting to address:    Chief Complaint   Patient presents with    Diabetes    Hypertension       Vitals:    20 1327   BP: (!) 149/92   Pulse: 99   Resp: 16   Temp: 98.3 °F (36.8 °C)   TempSrc: Oral   SpO2: 99%   Weight: 213 lb (96.6 kg)   Height: 5' 5\" (1.651 m)   PainSc:   0 - No pain       Hearing/Vision:   No exam data present    Learning Assessment:     Learning Assessment 3/10/2014   PRIMARY LEARNER Patient   PRIMARY LANGUAGE ENGLISH   LEARNER PREFERENCE PRIMARY OTHER (COMMENT)   ANSWERED BY patient   RELATIONSHIP SELF     Depression Screening:     3 most recent PHQ Screens 2018   [facility] Not Done Patient Decline   Little interest or pleasure in doing things -   Feeling down, depressed, irritable, or hopeless -   Total Score PHQ 2 -     Fall Risk Assessment:     Fall Risk Assessment, last 12 mths 2019   Able to walk? Yes   Fall in past 12 months? No     Abuse Screening:     Abuse Screening Questionnaire 2019   Do you ever feel afraid of your partner? N   Are you in a relationship with someone who physically or mentally threatens you? N   Is it safe for you to go home? Y     Coordination of Care Questionaire:   1. Have you been to the ER, urgent care clinic since your last visit? Hospitalized since your last visit? NO    2. Have you seen or consulted any other health care providers outside of the [address] [place] [address] since your last visit? Include any pap smears or colon screening. NO    Advanced Directive:   1. Do you have an Advanced Directive? NO    2. Would you like information on Advanced Directives?  NO

## 2020-06-16 NOTE — PROGRESS NOTES
Assessment/Plan:    *Diagnoses and all orders for this visit:    1. Type 2 diabetes mellitus with diabetic polyneuropathy, with long-term current use of insulin (HCC)  -     AMB POC HEMOGLOBIN A1C    2. Essential hypertension    3. Pure hypercholesterolemia    Other orders  -     atorvastatin (LIPITOR) 10 mg tablet; Take 1 Tab by mouth daily. Will not increase her meds at this time because she really needs to wotk on her diet. When she is focused on this, her A1c looks great. Will f/u in 3 months and repeat A1c. The plan was discussed with the patient. The patient verbalized understanding and is in agreement with the plan. All medication potential side effects were discussed with the patient.    -------------------------------------------------------------------------------------------------------------------        Dexter Mosquera is a 63 y.o. female and presents with Diabetes and Hypertension         Subjective:  Pt here for f/u. DM:  Not at goal, A1c today is 9.3%. Has not been following a diabetic diet. HTN:  At goal.    H:LD:  Stable. Review of Systems - General ROS: negative         The problem list was updated as a part of today's visit. Patient Active Problem List   Diagnosis Code    HTN (hypertension) I10    Insomnia G47.00    GERD (gastroesophageal reflux disease) K21.9    Schizophrenia ([facility] F20.9    [facility] E11.9    Hyperlipidemia E78.5    Di[facility] retinopathy [facility] E11.319    Cataract H26.9    Glaucoma H40.9    Sleep apnea, obstructive G47.33    Diabetic neuropathy (HCC) E11.40    Heel spur M77.30    Depression F32.9    Type 2 diabetes with nephropathy (HCC) E11.21    Severe obesity (BMI 35.0-39. 9) with comorbidity ([facility] E66.01    Obesity (BMI 30-39. 9) E66.9    CVA, old, cognitive deficits I69.319       The PSH, FH were reviewed.         SH:  Social History     Tobacco Use    Smoking status: Current Every Day Smoker Packs/day: 0.50     Years: 21.00     Pack years: 10.50     Types: Cigarettes    Smokeless tobacco: Former User   Substance Use Topics    Alcohol use: No    Drug use: No       Medications/Allergies:  Current Outpatient Medications on File Prior to Visit   Medication Sig Dispense Refill    pantoprazole (PROTONIX) 40 mg tablet TAKE 1 TABLET BY MOUTH EVERY DAY 90 Tab 1    amLODIPine (NORVASC) 10 mg tablet TAKE 1 TABLET BY MOUTH DAILY 90 Tab 0    QUEtiapine (SEROquel) 50 mg tablet TAKE 1 TABLET BY MOUTH EVERY EVENING AS NEEDED 90 Tab 0    glipiZIDE SR (GLUCOTROL XL) 10 mg CR tablet TAKE 1 TABLET BY MOUTH TWICE DAILY 180 Tab 0    insulin glargine (Lantus Solostar U-100 Insulin) 100 unit/mL (3 mL) inpn ADMINISTER 48 UNITS UNDER THE SKIN EVERY DAY 45 mL 2    Insulin Needles, Disposable, (Kendall Pen Needle) 32 gauge x 5/32\" ndle USE AS DIRECTED ONCE A  Pen Needle 2    amitriptyline (ELAVIL) 100 mg tablet TAKE 1 TABLET BY MOUTH EVERY NIGHT. GENERIC FOR ELAVIL. 90 Tab 1    multivitamin (ONE A DAY) tablet Take 1 Tab by mouth daily.  meloxicam (MOBIC) 15 mg tablet TAKE 1 TABLET BY MOUTH EVERY DAY 90 Tab 1    aspirin delayed-release 81 mg tablet Take  by mouth daily.  ALCOHOL PREP PADS padm USE TWICE DAILY AS DIRECTED 200 Pad 1    Ferrous Sulfate (SLOW FE) 47.5 mg iron TbER tablet Take 1 Tab by mouth daily. 90 Tab 1    Blood-Glucose Meter (CONTOUR METER) monitoring kit DX E11.9 1 Kit 0    cholecalciferol, vitamin d3, (VITAMIN D) 1,000 unit tablet Take  by mouth two (2) times a day.  meloxicam (MOBIC) 15 mg tablet TAKE 1 TABLET BY MOUTH EVERY DAY 90 Tab 1    [DISCONTINUED] simvastatin (ZOCOR) 10 mg tablet TAKE 1 TABLET BY MOUTH EVERY NIGHT 90 Tab 1    [DISCONTINUED] KENDALL PEN NEEDLE 32 gauge x 5/32\" ndle USE ONCE EVERY DAY AS DIRECTED 100 Pen Needle 1     No current facility-administered medications on file prior to visit.          Allergies   Allergen Reactions    Amaryl [Glimepiride] Nausea Only    Penicillins Nausea and Vomiting         Health Maintenance:   Health Maintenance   Topic Date Due    Hepatitis C Screening  1955    Cologuard Q3Y  02/13/2007    Shingrix Vaccine Age 50> (1 of 2) 02/13/2007    PAP AKA CERVICAL CYTOLOGY  03/18/2017    A1C test (Diabetic or Prediabetic)  06/16/2020    Influenza Age 9 to Adult  10/20/2026 (Originally 8/1/2020)    Medicare Yearly Exam  08/29/2020    Foot Exam Q1  08/29/2020    MICROALBUMIN Q1  08/29/2020    Lipid Screen  08/29/2020    GLAUCOMA SCREENING Q2Y  09/26/2020    Eye Exam Retinal or Dilated  09/26/2020    Breast Cancer Screen Mammogram  05/10/2021    Bone Densitometry (Dexa) Screening  02/13/2022    DTaP/Tdap/Td series (2 - Td) 01/13/2027    Pneumococcal 0-64 years  Completed       Objective:  Visit Vitals  /80   Pulse 99   Temp 98.3 °F (36.8 °C) (Oral)   Resp 16   Ht 5' 5\" (1.651 m)   Wt 213 lb (96.6 kg)   SpO2 99%   BMI 35.45 kg/m²          Nurses notes and VS reviewed.       Physical Examination: General appearance - alert, well appearing, and in no distress  Chest - clear to auscultation, no wheezes, rales or rhonchi, symmetric air entry  Heart - normal rate, regular rhythm, normal S1, S2, no murmurs, rubs, clicks or gallops  Extremities - peripheral pulses normal, no pedal edema, no clubbing or cyanosis          Labwork and Ancillary Studies:    CBC w/Diff  Lab Results   Component Value Date/Time    WBC 9.2 08/29/2019 11:38 AM    HGB 12.3 08/29/2019 11:38 AM    PLATELET 352 85/40/2932 11:38 AM         Basic Metabolic Profile  Lab Results   Component Value Date/Time    Sodium 139 08/29/2019 11:38 AM    Potassium 3.9 08/29/2019 11:38 AM    Chloride 104 08/29/2019 11:38 AM    CO2 31 08/29/2019 11:38 AM    Anion gap 4 08/29/2019 11:38 AM    Glucose 137 (H) 08/29/2019 11:38 AM    BUN 18 08/29/2019 11:38 AM    Creatinine 1.09 08/29/2019 11:38 AM    BUN/Creatinine ratio 17 08/29/2019 11:38 AM    GFR est AA >60 08/29/2019 11:38 AM    GFR est non-AA 51 (L) 08/29/2019 11:38 AM    Calcium 9.4 08/29/2019 11:38 AM         LFT  Lab Results   Component Value Date/Time    ALT (SGPT) 26 08/29/2019 11:38 AM    Alk.  phosphatase 116 08/29/2019 11:38 AM    Bilirubin, direct <0.1 08/29/2019 11:38 AM    Bilirubin, total 0.2 08/29/2019 11:38 AM         Cholesterol  Lab Results   Component Value Date/Time    Cholesterol, total 148 08/29/2019 11:38 AM    HDL Cholesterol 47 08/29/2019 11:38 AM    LDL, calculated 71.8 08/29/2019 11:38 AM    Triglyceride 146 08/29/2019 11:38 AM    CHOL/HDL Ratio 3.1 08/29/2019 11:38 AM

## 2020-07-13 DIAGNOSIS — E11.9 DIABETES MELLITUS WITHOUT COMPLICATION (HCC): ICD-10-CM

## 2020-07-13 RX ORDER — GLIPIZIDE 10 MG/1
TABLET, FILM COATED, EXTENDED RELEASE ORAL
Qty: 180 TAB | Refills: 0 | Status: SHIPPED | OUTPATIENT
Start: 2020-07-13 | End: 2020-10-15 | Stop reason: SDUPTHER

## 2020-07-13 RX ORDER — QUETIAPINE FUMARATE 50 MG/1
TABLET, FILM COATED ORAL
Qty: 90 TAB | Refills: 0 | Status: SHIPPED | OUTPATIENT
Start: 2020-07-13 | End: 2020-10-15 | Stop reason: SDUPTHER

## 2020-07-24 DIAGNOSIS — I10 ESSENTIAL HYPERTENSION: ICD-10-CM

## 2020-07-26 RX ORDER — AMLODIPINE BESYLATE 10 MG/1
TABLET ORAL
Qty: 90 TAB | Refills: 0 | Status: SHIPPED | OUTPATIENT
Start: 2020-07-26 | End: 2020-10-23 | Stop reason: SDUPTHER

## 2020-09-09 LAB — HEMOCCULT STL QL: NORMAL

## 2020-09-16 ENCOUNTER — OFFICE VISIT (OUTPATIENT)
Dept: FAMILY MEDICINE CLINIC | Age: 65
End: 2020-09-16

## 2020-09-16 ENCOUNTER — HOSPITAL ENCOUNTER (OUTPATIENT)
Dept: LAB | Age: 65
Discharge: HOME | End: 2020-09-16
Payer: MEDICARE

## 2020-09-16 VITALS
SYSTOLIC BLOOD PRESSURE: 121 MMHG | BODY MASS INDEX: 35.32 KG/M2 | HEART RATE: 108 BPM | RESPIRATION RATE: 16 BRPM | WEIGHT: 212 LBS | DIASTOLIC BLOOD PRESSURE: 75 MMHG | OXYGEN SATURATION: 98 % | TEMPERATURE: 98 F | HEIGHT: 65 IN

## 2020-09-16 DIAGNOSIS — G47.33 SLEEP APNEA, OBSTRUCTIVE: ICD-10-CM

## 2020-09-16 DIAGNOSIS — Z79.4 TYPE 2 DIABETES MELLITUS WITH DIABETIC POLYNEUROPATHY, WITH LONG-TERM CURRENT USE OF INSULIN (HCC): ICD-10-CM

## 2020-09-16 DIAGNOSIS — I10 ESSENTIAL HYPERTENSION: ICD-10-CM

## 2020-09-16 DIAGNOSIS — R76.8 HEPATITIS C ANTIBODY TEST POSITIVE: ICD-10-CM

## 2020-09-16 DIAGNOSIS — E11.42 TYPE 2 DIABETES MELLITUS WITH DIABETIC POLYNEUROPATHY, WITH LONG-TERM CURRENT USE OF INSULIN (HCC): Primary | ICD-10-CM

## 2020-09-16 DIAGNOSIS — Z11.59 NEED FOR HEPATITIS C SCREENING TEST: ICD-10-CM

## 2020-09-16 DIAGNOSIS — E55.9 HYPOVITAMINOSIS D: ICD-10-CM

## 2020-09-16 DIAGNOSIS — E78.00 PURE HYPERCHOLESTEROLEMIA: ICD-10-CM

## 2020-09-16 DIAGNOSIS — Z79.4 TYPE 2 DIABETES MELLITUS WITH DIABETIC POLYNEUROPATHY, WITH LONG-TERM CURRENT USE OF INSULIN (HCC): Primary | ICD-10-CM

## 2020-09-16 DIAGNOSIS — E11.42 TYPE 2 DIABETES MELLITUS WITH DIABETIC POLYNEUROPATHY, WITH LONG-TERM CURRENT USE OF INSULIN (HCC): ICD-10-CM

## 2020-09-16 LAB
25(OH)D3 SERPL-MCNC: 42.5 NG/ML (ref 30–100)
ALBUMIN SERPL-MCNC: 3.9 G/DL (ref 3.4–5)
ALBUMIN/GLOB SERPL: 1.1 {RATIO} (ref 0.8–1.7)
ALP SERPL-CCNC: 112 U/L (ref 45–117)
ALT SERPL-CCNC: 39 U/L (ref 13–56)
ANION GAP SERPL CALC-SCNC: 8 MMOL/L (ref 3–18)
AST SERPL-CCNC: 18 U/L (ref 10–38)
BASOPHILS # BLD: 0 K/UL (ref 0–0.1)
BASOPHILS NFR BLD: 0 % (ref 0–2)
BILIRUB DIRECT SERPL-MCNC: 0.1 MG/DL (ref 0–0.2)
BILIRUB SERPL-MCNC: 0.3 MG/DL (ref 0.2–1)
BUN SERPL-MCNC: 18 MG/DL (ref 7–18)
BUN/CREAT SERPL: 17 (ref 12–20)
CALCIUM SERPL-MCNC: 9.3 MG/DL (ref 8.5–10.1)
CHLORIDE SERPL-SCNC: 104 MMOL/L (ref 100–111)
CHOLEST SERPL-MCNC: 143 MG/DL
CO2 SERPL-SCNC: 28 MMOL/L (ref 21–32)
CREAT SERPL-MCNC: 1.09 MG/DL (ref 0.6–1.3)
CREAT UR-MCNC: 251 MG/DL (ref 30–125)
DIFFERENTIAL METHOD BLD: ABNORMAL
EOSINOPHIL # BLD: 0.2 K/UL (ref 0–0.4)
EOSINOPHIL NFR BLD: 2 % (ref 0–5)
ERYTHROCYTE [DISTWIDTH] IN BLOOD BY AUTOMATED COUNT: 12.6 % (ref 11.6–14.5)
GLOBULIN SER CALC-MCNC: 3.6 G/DL (ref 2–4)
GLUCOSE SERPL-MCNC: 192 MG/DL (ref 74–99)
HBA1C MFR BLD HPLC: 9.2 %
HCT VFR BLD AUTO: 41.2 % (ref 35–45)
HDLC SERPL-MCNC: 48 MG/DL (ref 40–60)
HDLC SERPL: 3 {RATIO} (ref 0–5)
HGB BLD-MCNC: 13.4 G/DL (ref 12–16)
LDLC SERPL CALC-MCNC: 52.6 MG/DL (ref 0–100)
LIPID PROFILE,FLP: ABNORMAL
LYMPHOCYTES # BLD: 3.1 K/UL (ref 0.9–3.6)
LYMPHOCYTES NFR BLD: 30 % (ref 21–52)
MCH RBC QN AUTO: 30.2 PG (ref 24–34)
MCHC RBC AUTO-ENTMCNC: 32.5 G/DL (ref 31–37)
MCV RBC AUTO: 92.8 FL (ref 74–97)
MICROALBUMIN UR-MCNC: 5.63 MG/DL (ref 0–3)
MICROALBUMIN/CREAT UR-RTO: 22 MG/G (ref 0–30)
MONOCYTES # BLD: 0.7 K/UL (ref 0.05–1.2)
MONOCYTES NFR BLD: 6 % (ref 3–10)
NEUTS SEG # BLD: 6.5 K/UL (ref 1.8–8)
NEUTS SEG NFR BLD: 62 % (ref 40–73)
PLATELET # BLD AUTO: 426 K/UL (ref 135–420)
PMV BLD AUTO: 10.2 FL (ref 9.2–11.8)
POTASSIUM SERPL-SCNC: 4.7 MMOL/L (ref 3.5–5.5)
PROT SERPL-MCNC: 7.5 G/DL (ref 6.4–8.2)
RBC # BLD AUTO: 4.44 M/UL (ref 4.2–5.3)
SODIUM SERPL-SCNC: 140 MMOL/L (ref 136–145)
T4 FREE SERPL-MCNC: 0.8 NG/DL (ref 0.7–1.5)
TRIGL SERPL-MCNC: 212 MG/DL (ref ?–150)
TSH SERPL DL<=0.05 MIU/L-ACNC: 1.64 UIU/ML (ref 0.36–3.74)
VLDLC SERPL CALC-MCNC: 42.4 MG/DL
WBC # BLD AUTO: 10.5 K/UL (ref 4.6–13.2)

## 2020-09-16 PROCEDURE — 82043 UR ALBUMIN QUANTITATIVE: CPT

## 2020-09-16 PROCEDURE — 86803 HEPATITIS C AB TEST: CPT

## 2020-09-16 PROCEDURE — 84439 ASSAY OF FREE THYROXINE: CPT

## 2020-09-16 PROCEDURE — 80061 LIPID PANEL: CPT

## 2020-09-16 PROCEDURE — 84443 ASSAY THYROID STIM HORMONE: CPT

## 2020-09-16 PROCEDURE — 36415 COLL VENOUS BLD VENIPUNCTURE: CPT

## 2020-09-16 PROCEDURE — 80048 BASIC METABOLIC PNL TOTAL CA: CPT

## 2020-09-16 PROCEDURE — 82306 VITAMIN D 25 HYDROXY: CPT

## 2020-09-16 PROCEDURE — 85025 COMPLETE CBC W/AUTO DIFF WBC: CPT

## 2020-09-16 PROCEDURE — 80076 HEPATIC FUNCTION PANEL: CPT

## 2020-09-16 RX ORDER — SIMVASTATIN 10 MG/1
TABLET, FILM COATED ORAL
COMMUNITY
End: 2020-09-17 | Stop reason: SDUPTHER

## 2020-09-16 NOTE — PROGRESS NOTES
Assessment/Plan:    *Diagnoses and all orders for this visit:    1. Type 2 diabetes mellitus with diabetic polyneuropathy, with long-term current use of insulin (HCC)  -     AMB POC HEMOGLOBIN A1C  -     REFERRAL TO ENDOCRINOLOGY  -     CBC WITH AUTOMATED DIFF; Future  -     HEPATIC FUNCTION PANEL; Future  -     LIPID PANEL; Future  -     METABOLIC PANEL, BASIC; Future  -     TSH 3RD GENERATION; Future  -     T4, FREE; Future  -     MICROALBUMIN, UR, RAND W/ MICROALB/CREAT RATIO; Future    2. Essential hypertension    3. Pure hypercholesterolemia    4. Sleep apnea, obstructive  -     SLEEP MEDICINE REFERRAL    5. Hypovitaminosis D  -     VITAMIN D, 25 HYDROXY; Future    6. Need for hepatitis C screening test  -     HEPATITIS C AB; Future        Will draw labs today. Will not adjust her meds yet since she is going to see Endo. The plan was discussed with the patient. The patient verbalized understanding and is in agreement with the plan. All medication potential side effects were discussed with the patient.    -------------------------------------------------------------------------------------------------------------------        Toma Tang is a 63 y.o. female and presents with Diabetes and Hypertension         Subjective:  Pt here for f/u. DM:  a1c today is 9.2%. Pt is still non-compliant with her diet and not exercising. I have discussed with her at length, the importance of this but can not get her to follow recommendations. She says she wants to do better. Has not been seen by Endo yet. HLD:  She is taking Zocor now. HTN:  Well controlled. Hx of sleep apnea but has not used her CPAP this whole year. She knows she needs to start using it again but has not been evaluated in some years. Review of Systems - General ROS: negative         The problem list was updated as a part of today's visit.   Patient Active Problem List   Diagnosis Code    HTN (hypertension) I10    Insomnia G47.00    GERD (gastroesophageal reflux disease) K21.9    Schizophrenia ([facility]) F20.9    DM (diabetes mellitus) ([facility]) E11.9    Hyperlipidemia E78.5    Di[facility] E11.319    Cataract H26.9    Glaucoma H40.9    Sleep apnea, obstructive G47.33    Diabetic neuropathy ([facility]) E11.40    Heel spur M77.30    Depression F32.9    Type 2 diabetes with nephropathy ([facility]) E11.21    Severe obesity (BMI 35.0-39. 9) with comorbidity ([facility] E66.01    Obesity (BMI 30-39. 9) E66.9    CVA, old, cognitive deficits I69.319       The PSH, FH were reviewed. SH:  Social History     Tobacco Use    Smoking status: Current Every Day Smoker     Packs/day: 0.50     Years: 21.00     Pack years: 10.50     Types: Cigarettes    Smokeless tobacco: Former User   Substance Use Topics    Alcohol use: No    Drug use: No       Medications/Allergies:  Current Outpatient Medications on File Prior to Visit   Medication Sig Dispense Refill    simvastatin (ZOCOR) 10 mg tablet Take  by mouth nightly.  amLODIPine (NORVASC) 10 mg tablet TAKE 1 TABLET BY MOUTH DAILY 90 Tab 0    glipiZIDE SR (GLUCOTROL XL) 10 mg CR tablet TAKE 1 TABLET BY MOUTH TWICE DAILY 180 Tab 0    QUEtiapine (SEROquel) 50 mg tablet TAKE 1 TABLET BY MOUTH EVERY EVENING AS NEEDED (Patient taking differently: 100 mg. TAKE 1 TABLET BY MOUTH EVERY EVENING AS NEEDED) 90 Tab 0    insulin glargine (LANTUS,BASAGLAR) 100 unit/mL (3 mL) inpn 38 Units by SubCUTAneous route daily.  pantoprazole (PROTONIX) 40 mg tablet TAKE 1 TABLET BY MOUTH EVERY DAY 90 Tab 1    Insulin Needles, Disposable, (Rosalva Pen Needle) 32 gauge x 5/32\" ndle USE AS DIRECTED ONCE A  Pen Needle 2    amitriptyline (ELAVIL) 100 mg tablet TAKE 1 TABLET BY MOUTH EVERY NIGHT. GENERIC FOR ELAVIL. 90 Tab 1    multivitamin (ONE A DAY) tablet Take 1 Tab by mouth daily.       meloxicam (MOBIC) 15 mg tablet TAKE 1 TABLET BY MOUTH EVERY DAY 90 Tab 1    aspirin delayed-release 81 mg tablet Take  by mouth daily.  ALCOHOL PREP PADS padm USE TWICE DAILY AS DIRECTED 200 Pad 1    Ferrous Sulfate (SLOW FE) 47.5 mg iron TbER tablet Take 1 Tab by mouth daily. 90 Tab 1    Blood-Glucose Meter (CONTOUR METER) monitoring kit DX E11.9 1 Kit 0    cholecalciferol, vitamin d3, (VITAMIN D) 1,000 unit tablet Take  by mouth two (2) times a day.  [DISCONTINUED] atorvastatin (LIPITOR) 10 mg tablet Take 1 Tab by mouth daily. 90 Tab 1    [DISCONTINUED] meloxicam (MOBIC) 15 mg tablet TAKE 1 TABLET BY MOUTH EVERY DAY 90 Tab 1     No current facility-administered medications on file prior to visit. Allergies   Allergen Reactions    Amaryl [Glimepiride] Nausea Only    Penicillins Nausea and Vomiting         Health Maintenance:   Health Maintenance   Topic Date Due    Hepatitis C Screening  1955    Cologuard Q3Y  02/13/2007    Shingrix Vaccine Age 50> (1 of 2) 02/13/2007    PAP AKA CERVICAL CYTOLOGY  03/18/2017    Foot Exam Q1  08/29/2020    MICROALBUMIN Q1  08/29/2020    Medicare Yearly Exam  08/29/2020    Lipid Screen  08/29/2020    GLAUCOMA SCREENING Q2Y  09/26/2020    Eye Exam Retinal or Dilated  09/26/2020    Flu Vaccine (1) 06/30/2021 (Originally 9/1/2020)    A1C test (Diabetic or Prediabetic)  12/16/2020    Breast Cancer Screen Mammogram  05/10/2021    Bone Densitometry (Dexa) Screening  02/13/2022    DTaP/Tdap/Td series (2 - Td) 01/13/2027    Pneumococcal 0-64 years  Completed       Objective:  Visit Vitals  /75   Pulse (!) 108   Temp 98 °F (36.7 °C) (Oral)   Resp 16   Ht 5' 5\" (1.651 m)   Wt 212 lb (96.2 kg)   SpO2 98%   BMI 35.28 kg/m²          Nurses notes and VS reviewed.       Physical Examination: General appearance - alert, well appearing, and in no distress  Chest - clear to auscultation, no wheezes, rales or rhonchi, symmetric air entry  Heart - normal rate, regular rhythm, normal S1, S2, no murmurs, rubs, clicks or gallops          Labwork and Ancillary Studies:    CBC w/Diff  Lab Results   Component Value Date/Time    WBC 9.2 08/29/2019 11:38 AM    HGB 12.3 08/29/2019 11:38 AM    PLATELET 863 30/60/9087 11:38 AM         Basic Metabolic Profile  Lab Results   Component Value Date/Time    Sodium 139 08/29/2019 11:38 AM    Potassium 3.9 08/29/2019 11:38 AM    Chloride 104 08/29/2019 11:38 AM    CO2 31 08/29/2019 11:38 AM    Anion gap 4 08/29/2019 11:38 AM    Glucose 137 (H) 08/29/2019 11:38 AM    BUN 18 08/29/2019 11:38 AM    Creatinine 1.09 08/29/2019 11:38 AM    BUN/Creatinine ratio 17 08/29/2019 11:38 AM    GFR est AA >60 08/29/2019 11:38 AM    GFR est non-AA 51 (L) 08/29/2019 11:38 AM    Calcium 9.4 08/29/2019 11:38 AM         LFT  Lab Results   Component Value Date/Time    ALT (SGPT) 26 08/29/2019 11:38 AM    Alk.  phosphatase 116 08/29/2019 11:38 AM    Bilirubin, direct <0.1 08/29/2019 11:38 AM    Bilirubin, total 0.2 08/29/2019 11:38 AM         Cholesterol  Lab Results   Component Value Date/Time    Cholesterol, total 148 08/29/2019 11:38 AM    HDL Cholesterol 47 08/29/2019 11:38 AM    LDL, calculated 71.8 08/29/2019 11:38 AM    Triglyceride 146 08/29/2019 11:38 AM    CHOL/HDL Ratio 3.1 08/29/2019 11:38 AM

## 2020-09-16 NOTE — PROGRESS NOTES
Maco Wells is a 63 y.o. female (: 1955) presenting to address:    Chief Complaint   Patient presents with    Diabetes    Hypertension       Vitals:    20 1304   BP: 121/75   Pulse: (!) 108   Resp: 16   Temp: 98 °F (36.7 °C)   TempSrc: Oral   SpO2: 98%   Weight: 212 lb (96.2 kg)   Height: 5' 5\" (1.651 m)   PainSc:   0 - No pain       Hearing/Vision:   No exam data present    Learning Assessment:     Learning Assessment 3/10/2014   PRIMARY LEARNER Patient   PRIMARY LANGUAGE ENGLISH   LEARNER PREFERENCE PRIMARY OTHER (COMMENT)   ANSWERED BY patient   RELATIONSHIP SELF     Depression Screening:     3 most recent PHQ Screens 2018   [facility] [address] [facility] Decline   Little interest or pleasure in doing things -   Feeling down, depressed, irritable, or hopeless -   Total Score PHQ 2 -     Fall Risk Assessment:     Fall Risk Assessment, last 12 mths 2019   Able to walk? Yes   Fall in past 12 months? No     Abuse Screening:     Abuse Screening Questionnaire 2019   Do you ever feel afraid of your partner? N   Are you in a relationship with someone who physically or mentally threatens you? N   Is it safe for you to go home? Y     Coordination of Care Questionaire:   1. Have you been to the ER, urgent care clinic since your last visit? Hospitalized since your last visit? NO    2. Have you seen or consulted any other health care providers outside of the [address] [place] [address] since your last visit? Include any pap smears or colon screening. NO    Advanced Directive:   1. Do you have an Advanced Directive? NO    2. Would you like information on Advanced Directives?  NO

## 2020-09-17 LAB
HCV AB SER IA-ACNC: 8.22 INDEX
HCV AB SERPL QL IA: POSITIVE
HCV COMMENT,HCGAC: ABNORMAL

## 2020-09-17 NOTE — TELEPHONE ENCOUNTER
Last refilled 4/17/202 for 90 with 1 .  Last OV 9/16/20   Future Appointments   Date Time Provider Kapil Sky   12/18/2020  1:15 PM Bree Longo MD [facility]

## 2020-09-20 RX ORDER — SIMVASTATIN 10 MG/1
10 TABLET, FILM COATED ORAL
Qty: 90 TAB | Refills: 0 | Status: SHIPPED | OUTPATIENT
Start: 2020-09-20 | End: 2020-12-21 | Stop reason: SDUPTHER

## 2020-09-20 NOTE — PROGRESS NOTES
Has pt ever been told anything about hepatitis C? Because I ran a screening test on her and it was positive. So this means that she has had prior exposure and we need to refer her to GI for further eval.  (let me know). TGs are again, mildly elevated. Work on diet, take all meds regularly. The rest of her labs are fine.

## 2020-09-28 RX ORDER — AMITRIPTYLINE HYDROCHLORIDE 100 MG/1
TABLET, FILM COATED ORAL
Qty: 90 TAB | Refills: 1 | Status: SHIPPED | OUTPATIENT
Start: 2020-09-28 | End: 2021-03-23 | Stop reason: SDUPTHER

## 2020-09-28 NOTE — TELEPHONE ENCOUNTER
Last refilled 3/25/20 for 90 with 1 .  Last OV 9/16/20   Future Appointments   Date Time Provider Kapil Sky   12/18/2020  1:15 PM Nacho Clark MD [facility]

## 2020-10-15 DIAGNOSIS — E11.9 DIABETES MELLITUS WITHOUT COMPLICATION (HCC): ICD-10-CM

## 2020-10-15 NOTE — TELEPHONE ENCOUNTER
Last refilled Glipizide 7/13/20 for 180 with 0 refills . Seroquel for 90 with 0 refills .  Last OV 9/16/20   Future Appointments   Date Time Provider Kapil Sky   12/18/2020  1:15 PM Mirna Salgado MD [facility]

## 2020-10-15 NOTE — PROGRESS NOTES
I spoke with patient she was told about 20 years ago when stationed in [place] with her  that she had Hep C . Sometime after that when she returned to [place] she was seen at the [facility] [place] and told it was gone .

## 2020-10-16 RX ORDER — QUETIAPINE FUMARATE 50 MG/1
50 TABLET, FILM COATED ORAL EVERY EVENING
Qty: 90 TAB | Refills: 1 | Status: SHIPPED | OUTPATIENT
Start: 2020-10-16 | End: 2021-04-19 | Stop reason: SDUPTHER

## 2020-10-16 RX ORDER — GLIPIZIDE 10 MG/1
10 TABLET, FILM COATED, EXTENDED RELEASE ORAL 2 TIMES DAILY
Qty: 180 TAB | Refills: 1 | Status: SHIPPED | OUTPATIENT
Start: 2020-10-16 | End: 2021-04-19 | Stop reason: SDUPTHER

## 2020-10-20 LAB — MICROALBUMIN UR TEST STR-MCNC: 11 MG/DL

## 2020-10-21 ENCOUNTER — TELEPHONE (OUTPATIENT)
Dept: FAMILY MEDICINE CLINIC | Age: 65
End: 2020-10-21

## 2020-10-21 NOTE — TELEPHONE ENCOUNTER
Patient called to let the nurse know that the office that her gastro referral was faxed to does not accept her insurance.

## 2020-10-23 DIAGNOSIS — I10 ESSENTIAL HYPERTENSION: ICD-10-CM

## 2020-10-23 NOTE — TELEPHONE ENCOUNTER
Last seen: 9/16/2020    Last refill: 07/26/2020 90 tabs 0 refills    Future Appointments   Date Time Provider Kapil Sky   12/18/2020  1:15 PM Katelynn Garcia MD [facility]

## 2020-10-25 RX ORDER — AMLODIPINE BESYLATE 10 MG/1
TABLET ORAL
Qty: 90 TAB | Refills: 1 | Status: SHIPPED | OUTPATIENT
Start: 2020-10-25 | End: 2021-07-22 | Stop reason: SDUPTHER

## 2020-11-05 ENCOUNTER — TELEPHONE (OUTPATIENT)
Dept: FAMILY MEDICINE CLINIC | Age: 65
End: 2020-11-05

## 2020-11-05 NOTE — TELEPHONE ENCOUNTER
Patient is calling following up on the status of her GASTROENTEROLOGY referral - she has not head anything from anyone yet. Patient would like to know your recommendation for which in house doctor you recommend for transitioning her care to within the same office here at [facility].

## 2020-11-06 NOTE — TELEPHONE ENCOUNTER
Last refilled 4/4/20 for 45 ml with 2 refills .  Last OV 9/16/20   Future Appointments   Date Time Provider Kapil Sky   12/18/2020  1:15 PM Rajeev Correia MD [facility]

## 2020-11-07 RX ORDER — INSULIN GLARGINE 100 [IU]/ML
38 INJECTION, SOLUTION SUBCUTANEOUS DAILY
Qty: 33 ML | Refills: 1 | Status: SHIPPED | OUTPATIENT
Start: 2020-11-07 | End: 2022-03-07

## 2020-11-13 ENCOUNTER — TELEPHONE (OUTPATIENT)
Dept: FAMILY MEDICINE CLINIC | Age: 65
End: 2020-11-13

## 2020-11-13 NOTE — TELEPHONE ENCOUNTER
Pt is calling to give the following information for her upcoming appt     Appt is Dec. 10   Address: [address]   Phone: [phone]  Time: 9:40AM

## 2020-12-01 RX ORDER — MELOXICAM 15 MG/1
TABLET ORAL
Qty: 90 TAB | Refills: 1 | Status: SHIPPED | OUTPATIENT
Start: 2020-12-01 | End: 2021-05-28 | Stop reason: SDUPTHER

## 2020-12-01 RX ORDER — PANTOPRAZOLE SODIUM 40 MG/1
TABLET, DELAYED RELEASE ORAL
Qty: 90 TAB | Refills: 1 | Status: SHIPPED | OUTPATIENT
Start: 2020-12-01 | End: 2021-06-23 | Stop reason: SDUPTHER

## 2020-12-18 ENCOUNTER — OFFICE VISIT (OUTPATIENT)
Dept: FAMILY MEDICINE CLINIC | Age: 65
End: 2020-12-18
Payer: MEDICAID

## 2020-12-18 VITALS
BODY MASS INDEX: 35.69 KG/M2 | WEIGHT: 214.2 LBS | HEART RATE: 103 BPM | TEMPERATURE: 98.9 F | HEIGHT: 65 IN | OXYGEN SATURATION: 99 % | SYSTOLIC BLOOD PRESSURE: 117 MMHG | RESPIRATION RATE: 16 BRPM | DIASTOLIC BLOOD PRESSURE: 74 MMHG

## 2020-12-18 DIAGNOSIS — E11.9 DIABETES MELLITUS WITHOUT COMPLICATION (HCC): Primary | ICD-10-CM

## 2020-12-18 DIAGNOSIS — E78.00 PURE HYPERCHOLESTEROLEMIA: ICD-10-CM

## 2020-12-18 DIAGNOSIS — I10 ESSENTIAL HYPERTENSION: ICD-10-CM

## 2020-12-18 DIAGNOSIS — R76.8 HEPATITIS C ANTIBODY TEST POSITIVE: ICD-10-CM

## 2020-12-18 LAB — HBA1C MFR BLD HPLC: 7.8 %

## 2020-12-18 PROCEDURE — 3051F HG A1C>EQUAL 7.0%<8.0%: CPT | Performed by: INTERNAL MEDICINE

## 2020-12-18 PROCEDURE — 83036 HEMOGLOBIN GLYCOSYLATED A1C: CPT | Performed by: INTERNAL MEDICINE

## 2020-12-18 PROCEDURE — 99213 OFFICE O/P EST LOW 20 MIN: CPT | Performed by: INTERNAL MEDICINE

## 2020-12-18 NOTE — PROGRESS NOTES
Himanshu Castro is a 63 y.o. female (: 1955) presenting to address:    Chief Complaint   Patient presents with    Hypertension     follow up       Vitals:    20 1255   BP: 117/74   Pulse: (!) 103   Resp: 16   Temp: 98.9 °F (37.2 °C)   TempSrc: Temporal   SpO2: 99%   Weight: 214 lb 3.2 oz (97.2 kg)   Height: 5' 5\" (1.651 m)   PainSc:   0 - No pain       Is someone accompanying this pt? NO    Is the patient using any DME equipment during OV? NO    Hearing/Vision:   No exam data present    Learning Assessment:     Learning Assessment 3/10/2014   PRIMARY LEARNER Patient   PRIMARY LANGUAGE ENGLISH   LEARNER PREFERENCE PRIMARY OTHER (COMMENT)   ANSWERED BY patient   RELATIONSHIP SELF     Depression Screening:     3 most recent PHQ Screens 2018   [facility] Not Done Patient Decline   Little interest or pleasure in doing things -   Feeling down, depressed, irritable, or hopeless -   Total Score PHQ 2 -     Fall Risk Assessment:     Fall Risk Assessment, last 12 mths 2019   Able to walk? Yes   Fall in past 12 months? No     Coordination of Care Questionaire:   1. Have you been to the ER, urgent care clinic since your last visit? Hospitalized since your last visit? NO    2. Have you seen or consulted any other health care providers outside of the [address] [place] [address] since your last visit? Include any pap smears or colon screening. NO    Advanced Directive:   1. Do you have an Advanced Directive? NO    2. Would you like information on Advanced Directives?  NO

## 2020-12-21 NOTE — PROGRESS NOTES
Assessment/Plan:    *Diagnoses and all orders for this visit:    1. Diabetes mellitus without complication (HCC)  -     AMB POC HEMOGLOBIN A1C    2. Hepatitis C antibody test positive    3. Essential hypertension    4. Pure hypercholesterolemia        The plan was discussed with the patient. The patient verbalized understanding and is in agreement with the plan. All medication potential side effects were discussed with the patient.    -------------------------------------------------------------------------------------------------------------------        Kaylan Balderrama is a 63 y.o. female and presents with Hypertension (follow up)         Subjective:  Pt seen for f/u. DM: A1c 7.8%. Doing well. HTN:  Well controlled,    HLD:  Stable. Review of Systems - General ROS: negative         The problem list was updated as a part of today's visit. Patient Active Problem List   Diagnosis Code    HTN (hypertension) I10    Insomnia G47.00    GERD (gastroesophageal reflux disease) K21.9    Schizophrenia ([facility] F20.9    DM (diabetes mellitus) ([facility] E11.9    Hyperlipidemia E78.5    [facility] E11.319    Cataract H26.9    Glaucoma H40.9    Sleep apnea, obstructive G47.33    Diabetic neuropathy (HCC) E11.40    Heel spur M77.30    Depression F32.9    Type 2 diabetes with nephropathy (HCC) E11.21    Severe obesity (BMI 35.0-39. 9) with comorbidity ([facility] E66.01    Obesity (BMI 30-39. 9) E66.9    CVA, old, cognitive deficits I69.319       The PSH, FH were reviewed.         SH:  Social History     Tobacco Use    Smoking status: Current Every Day Smoker     Packs/day: 0.50     Years: 21.00     Pack years: 10.50     Types: Cigarettes    Smokeless tobacco: Former User   Substance Use Topics    Alcohol use: No    Drug use: No       Medications/Allergies:  Current Outpatient Medications on File Prior to Visit   Medication Sig Dispense Refill    meloxicam (MOBIC) 15 mg tablet TAKE 1 TABLET BY MOUTH EVERY DAY 90 Tab 1    pantoprazole (PROTONIX) 40 mg tablet TAKE 1 TABLET BY MOUTH EVERY DAY 90 Tab 1    insulin glargine (LANTUS,BASAGLAR) 100 unit/mL (3 mL) inpn 38 Units by SubCUTAneous route daily for 90 days. 33 mL 1    amLODIPine (NORVASC) 10 mg tablet TAKE 1 TABLET BY MOUTH DAILY 90 Tab 1    QUEtiapine (SEROquel) 50 mg tablet Take 1 Tab by mouth every evening. TAKE 1 TABLET BY MOUTH EVERY EVENING AS NEEDED 90 Tab 1    glipiZIDE SR (GLUCOTROL XL) 10 mg CR tablet Take 1 Tab by mouth two (2) times a day. 180 Tab 1    amitriptyline (ELAVIL) 100 mg tablet TAKE 1 TABLET BY MOUTH EVERY NIGHT. GENERIC FOR ELAVIL. 90 Tab 1    simvastatin (ZOCOR) 10 mg tablet Take 1 Tab by mouth nightly. 90 Tab 0    Insulin Needles, Disposable, (Rosalva Pen Needle) 32 gauge x 5/32\" ndle USE AS DIRECTED ONCE A  Pen Needle 2    multivitamin (ONE A DAY) tablet Take 1 Tab by mouth daily.  aspirin delayed-release 81 mg tablet Take  by mouth daily.  ALCOHOL PREP PADS padm USE TWICE DAILY AS DIRECTED 200 Pad 1    Ferrous Sulfate (SLOW FE) 47.5 mg iron TbER tablet Take 1 Tab by mouth daily. 90 Tab 1    Blood-Glucose Meter (CONTOUR METER) monitoring kit DX E11.9 1 Kit 0    cholecalciferol, vitamin d3, (VITAMIN D) 1,000 unit tablet Take  by mouth two (2) times a day. No current facility-administered medications on file prior to visit.          Allergies   Allergen Reactions    Amaryl [Glimepiride] Nausea Only    Penicillins Nausea and Vomiting         Health Maintenance:   Health Maintenance   Topic Date Due    Shingrix Vaccine Age 49> (1 of 2) 02/13/2007    PAP AKA CERVICAL CYTOLOGY  03/18/2017    Medicare Yearly Exam  08/29/2020    Foot Exam Q1  08/29/2020    GLAUCOMA SCREENING Q2Y  09/26/2020    Eye Exam Retinal or Dilated  09/26/2020    Flu Vaccine (1) 06/30/2021 (Originally 9/1/2020)    Breast Cancer Screen Mammogram  05/10/2021    Colorectal Cancer Screening Combo  09/09/2021    Lipid Screen  09/16/2021    MICROALBUMIN Q1  10/20/2021    A1C test (Diabetic or Prediabetic)  12/18/2021    Bone Densitometry (Dexa) Screening  02/13/2022    DTaP/Tdap/Td series (2 - Td) 01/13/2027    Hepatitis C Screening  Completed    Pneumococcal 0-64 years  Completed       Objective:  Visit Vitals  /74 (BP 1 Location: Right arm, BP Patient Position: Sitting)   Pulse (!) 103   Temp 98.9 °F (37.2 °C) (Temporal)   Resp 16   Ht 5' 5\" (1.651 m)   Wt 214 lb 3.2 oz (97.2 kg)   SpO2 99%   BMI 35.64 kg/m²          Nurses notes and VS reviewed. Physical Examination: General appearance - alert, well appearing, and in no distress  Chest - clear to auscultation, no wheezes, rales or rhonchi, symmetric air entry  Heart - normal rate, regular rhythm, normal S1, S2, no murmurs, rubs, clicks or gallops  Abdomen - soft, nontender, nondistended, no masses or organomegaly          Labwork and Ancillary Studies:    CBC w/Diff  Lab Results   Component Value Date/Time    WBC 10.5 09/16/2020 01:38 PM    HGB 13.4 09/16/2020 01:38 PM    PLATELET 639 (H) 40/77/4488 01:38 PM         Basic Metabolic Profile  Lab Results   Component Value Date/Time    Sodium 140 09/16/2020 01:38 PM    Potassium 4.7 09/16/2020 01:38 PM    Chloride 104 09/16/2020 01:38 PM    CO2 28 09/16/2020 01:38 PM    Anion gap 8 09/16/2020 01:38 PM    Glucose 192 (H) 09/16/2020 01:38 PM    BUN 18 09/16/2020 01:38 PM    Creatinine 1.09 09/16/2020 01:38 PM    BUN/Creatinine ratio 17 09/16/2020 01:38 PM    GFR est AA >60 09/16/2020 01:38 PM    GFR est non-AA 51 (L) 09/16/2020 01:38 PM    Calcium 9.3 09/16/2020 01:38 PM         LFT  Lab Results   Component Value Date/Time    ALT (SGPT) 39 09/16/2020 01:38 PM    Alk.  phosphatase 112 09/16/2020 01:38 PM    Bilirubin, direct 0.1 09/16/2020 01:38 PM    Bilirubin, total 0.3 09/16/2020 01:38 PM         Cholesterol  Lab Results   Component Value Date/Time    Cholesterol, total 143 09/16/2020 01:38 PM    HDL Cholesterol 48 09/16/2020 01:38 PM    LDL, calculated 52.6 09/16/2020 01:38 PM    Triglyceride 212 (H) 09/16/2020 01:38 PM    CHOL/HDL Ratio 3.0 09/16/2020 01:38 PM

## 2020-12-22 ENCOUNTER — TELEPHONE (OUTPATIENT)
Dept: FAMILY MEDICINE CLINIC | Age: 65
End: 2020-12-22

## 2020-12-23 RX ORDER — SIMVASTATIN 10 MG/1
10 TABLET, FILM COATED ORAL
Qty: 90 TAB | Refills: 1 | Status: SHIPPED | OUTPATIENT
Start: 2020-12-23 | End: 2022-06-03 | Stop reason: SDUPTHER

## 2021-01-18 ENCOUNTER — OFFICE VISIT (OUTPATIENT)
Dept: FAMILY MEDICINE CLINIC | Age: 66
End: 2021-01-18
Payer: MEDICARE

## 2021-01-18 VITALS
RESPIRATION RATE: 16 BRPM | TEMPERATURE: 98.6 F | WEIGHT: 213 LBS | DIASTOLIC BLOOD PRESSURE: 83 MMHG | SYSTOLIC BLOOD PRESSURE: 131 MMHG | HEART RATE: 93 BPM | BODY MASS INDEX: 35.49 KG/M2 | HEIGHT: 65 IN | OXYGEN SATURATION: 96 %

## 2021-01-18 DIAGNOSIS — Z79.4 CONTROLLED TYPE 2 DIABETES MELLITUS WITH HYPERGLYCEMIA, WITH LONG-TERM CURRENT USE OF INSULIN (HCC): ICD-10-CM

## 2021-01-18 DIAGNOSIS — E78.2 MIXED HYPERLIPIDEMIA: Primary | ICD-10-CM

## 2021-01-18 DIAGNOSIS — I10 ESSENTIAL HYPERTENSION: ICD-10-CM

## 2021-01-18 DIAGNOSIS — E11.65 CONTROLLED TYPE 2 DIABETES MELLITUS WITH HYPERGLYCEMIA, WITH LONG-TERM CURRENT USE OF INSULIN (HCC): ICD-10-CM

## 2021-01-18 PROCEDURE — 99213 OFFICE O/P EST LOW 20 MIN: CPT | Performed by: NURSE PRACTITIONER

## 2021-01-18 RX ORDER — BIMATOPROST 0.1 MG/ML
1 SOLUTION/ DROPS OPHTHALMIC
COMMUNITY

## 2021-01-18 NOTE — PROGRESS NOTES
LIZ Goldstein is a 63y.o. year old female who presents today to establish care and for management of multiple chronic conditions. She is being followed by GI for her HCV and is having a colonoscopy soon. Her old PCP was managing her diabetes, HTN and HLD. Overall she is feeling well, only complaint is that she sometimes notices blood on the toilet tissue after having a bowel movement. It is not a lot and does not happen every time. Sometimes she feels constipated and strains when she has to have a bowel movement. No other GI symptoms. Past Medical History:   Diagnosis Date    Cataract     CVA, old, cognitive deficits     Depression 1/13/2015    Diabetes ([address]     Diabetic neuropathy ([address] 9/1/2013    Diabetic retinopathy ([address] 3/2/2012    DM (diabetes mellitus) ([address] 3/2/2012    GERD (gastroesophageal reflux disease) 4/21/2011    Glaucoma     Heel spur 11/24/2014    HTN (hypertension) 4/21/2011    Hyperlipidemia 3/2/2012    Insomnia 4/21/2011    Kidney stone     Obesity (BMI 30-39. 9) 7/16/2018    Retained ureteral stent     Schizophrenia ([address] 4/21/2011    Sleep apnea, obstructive 9/1/2013    Stroke ([address] 08/2018    pt states her Neurologist diagnosed her with stroke in August    Urinary frequency        Past Surgical History:   Procedure Laterality Date    HX OPEN CHOLECYSTECTOMY      HX UROLOGICAL Left 09/2017    Cystoscopy, Left retrograde pyelography, Left ureteroscopic stone extraction with laser lithotripsy and basket stone extraction,Left 6 x 24 cm double-J ureteral stent placement, Interpretation of urography.   [address] Dr Romeo Silva, INCISIONAL  1970    left benign       Social History     Tobacco Use    Smoking status: Current Every Day Smoker     Packs/day: 0.50     Years: 21.00     Pack years: 10.50     Types: Cigarettes    Smokeless tobacco: Former User   Substance Use Topics    Alcohol use: No    Drug use: No         Current Outpatient Medications:     bimatoprost (Lumigan) 0.01 % ophthalmic drops, Administer 1 Drop to both eyes nightly., Disp: , Rfl:     mineral oil/petrolatum,white (OVERNIGHT LUBRICATING EYE OP), Apply  to eye nightly., Disp: , Rfl:     Insulin Needles, Disposable, (Rosalva Pen Needle) 32 gauge x 5/32\" ndle, AS DIRECTED ONCE A DAY, Disp: 100 Pen Needle, Rfl: 1    simvastatin (ZOCOR) 10 mg tablet, Take 1 Tab by mouth nightly., Disp: 90 Tab, Rfl: 1    meloxicam (MOBIC) 15 mg tablet, TAKE 1 TABLET BY MOUTH EVERY DAY, Disp: 90 Tab, Rfl: 1    insulin glargine (LANTUS,BASAGLAR) 100 unit/mL (3 mL) inpn, 38 Units by SubCUTAneous route daily for 90 days. (Patient taking differently: 55 Units by SubCUTAneous route daily.), Disp: 33 mL, Rfl: 1    amLODIPine (NORVASC) 10 mg tablet, TAKE 1 TABLET BY MOUTH DAILY, Disp: 90 Tab, Rfl: 1    QUEtiapine (SEROquel) 50 mg tablet, Take 1 Tab by mouth every evening. TAKE 1 TABLET BY MOUTH EVERY EVENING AS NEEDED, Disp: 90 Tab, Rfl: 1    glipiZIDE SR (GLUCOTROL XL) 10 mg CR tablet, Take 1 Tab by mouth two (2) times a day., Disp: 180 Tab, Rfl: 1    amitriptyline (ELAVIL) 100 mg tablet, TAKE 1 TABLET BY MOUTH EVERY NIGHT. GENERIC FOR ELAVIL., Disp: 90 Tab, Rfl: 1    multivitamin (ONE A DAY) tablet, Take 1 Tab by mouth daily. , Disp: , Rfl:     aspirin delayed-release 81 mg tablet, Take  by mouth daily. , Disp: , Rfl:     ALCOHOL PREP PADS padm, USE TWICE DAILY AS DIRECTED, Disp: 200 Pad, Rfl: 1    Ferrous Sulfate (SLOW FE) 47.5 mg iron TbER tablet, Take 1 Tab by mouth daily. , Disp: 90 Tab, Rfl: 1    Blood-Glucose Meter (CONTOUR METER) monitoring kit, DX E11.9, Disp: 1 Kit, Rfl: 0    cholecalciferol, vitamin d3, (VITAMIN D) 1,000 unit tablet, Take  by mouth two (2) times a day., Disp: , Rfl:     pantoprazole (PROTONIX) 40 mg tablet, TAKE 1 TABLET BY MOUTH EVERY DAY, Disp: 90 Tab, Rfl: 1     Allergies   Allergen Reactions    Amaryl [Glimepiride] Nausea Only    Penicillins Nausea and Vomiting Review of Systems   Constitutional: Negative for chills, fever, malaise/fatigue and weight loss. HENT: Negative for congestion, ear pain, hearing loss, sinus pain, sore throat and tinnitus. Eyes: Negative for blurred vision, double vision, photophobia and pain. Respiratory: Negative for cough and shortness of breath. Cardiovascular: Negative for chest pain, palpitations and leg swelling. Gastrointestinal: Negative for abdominal pain, constipation, diarrhea, heartburn, nausea and vomiting. Blood on toilet tissue, none in stool   Genitourinary: Negative for dysuria, frequency and urgency. Musculoskeletal: Negative for back pain, joint pain and myalgias. Skin: Negative for rash. Neurological: Negative for dizziness and headaches. Psychiatric/Behavioral: Negative for depression and suicidal ideas. The patient is not nervous/anxious. PE  /83   Pulse 93   Temp 98.6 °F (37 °C) (Temporal)   Resp 16   Ht 5' 5\" (1.651 m)   Wt 213 lb (96.6 kg)   SpO2 96%   BMI 35.45 kg/m²     Physical Exam  Constitutional:       General: She is not in acute distress. Appearance: Normal appearance. HENT:      Head: Normocephalic and atraumatic. Cardiovascular:      Rate and Rhythm: Normal rate and regular rhythm. Heart sounds: S1 normal and S2 normal. No murmur. No friction rub. No gallop. No S3 or S4 sounds. Pulmonary:      Effort: Pulmonary effort is normal.      Breath sounds: Normal breath sounds. No wheezing, rhonchi or rales. Abdominal:      General: Bowel sounds are normal. There is no distension. Palpations: Abdomen is soft. There is no hepatomegaly or splenomegaly. Tenderness: There is no abdominal tenderness. There is no guarding or rebound. Musculoskeletal:      Right lower leg: No edema. Left lower leg: No edema. Neurological:      Mental Status: She is alert. Assessment/Plan  1.  Blood in stool  Likely due to hemorrhoids, mention to GI, having colonoscopy soon  Advised not to strain while having a BM   Push PO fluids    2. DM  Last A1C 7.8  Recheck in march  Continue with diet, exercise and weight loss    3.  HTN  BP looks good

## 2021-01-18 NOTE — PROGRESS NOTES
Caroline Hale is a 63 y.o. female (: 1955) presenting to address:    Chief Complaint   Patient presents with    Establish Care    Diabetes     had A1c done by insurance visiting nurse reading 8.1     Rectal Bleeding     quarter size amount with bowel movement . Vitals:    21 1426   BP: 131/83   Pulse: 93   Resp: 16   Temp: 98.6 °F (37 °C)   TempSrc: Temporal   SpO2: 96%   Weight: 213 lb (96.6 kg)   Height: 5' 5\" (1.651 m)   PainSc:   0 - No pain       Hearing/Vision:   No exam data present    Learning Assessment:     Learning Assessment 3/10/2014   PRIMARY LEARNER Patient   PRIMARY LANGUAGE ENGLISH   LEARNER PREFERENCE PRIMARY OTHER (COMMENT)   ANSWERED BY patient   RELATIONSHIP SELF     Depression Screening:     3 most recent PHQ Screens 2021   PHQ Not Done -   Little interest or pleasure in doing things Not at all   Feeling down, depressed, irritable, or hopeless Not at all   Total Score PHQ 2 0     Fall Risk Assessment:     Fall Risk Assessment, last 12 mths 2021   Able to walk? Yes   Fall in past 12 months? 0   Do you feel unsteady? 0   Are you worried about falling 0     Abuse Screening:     Abuse Screening Questionnaire 2021   Do you ever feel afraid of your partner? N   Are you in a relationship with someone who physically or mentally threatens you? N   Is it safe for you to go home? Y     Coordination of Care Questionaire:   1. Have you been to the ER, urgent care clinic since your last visit? Hospitalized since your last visit? NO    2. Have you seen or consulted any other health care providers outside of the [address] [place] [address] since your last visit? Include any pap smears or colon screening. NO    Advanced Directive:   1. Do you have an Advanced Directive? NO    2. Would you like information on Advanced Directives?  NO

## 2021-03-15 ENCOUNTER — TELEPHONE (OUTPATIENT)
Dept: FAMILY MEDICINE CLINIC | Age: 66
End: 2021-03-15

## 2021-03-15 NOTE — TELEPHONE ENCOUNTER
Attempted to reach to r/s upcoming appt due to provider out of office but vm box is not set up.     Future Appointments   Date Time Provider Kapil Asya   3/18/2021 10:00 AM LAB_[facility]   3/22/2021  1:00 PM Ericka Candelaria , NP [facility]

## 2021-03-18 ENCOUNTER — HOSPITAL ENCOUNTER (OUTPATIENT)
Dept: LAB | Age: 66
Discharge: HOME | End: 2021-03-18
Payer: MEDICARE

## 2021-03-18 ENCOUNTER — APPOINTMENT (OUTPATIENT)
Dept: FAMILY MEDICINE CLINIC | Age: 66
End: 2021-03-18

## 2021-03-18 DIAGNOSIS — E78.2 MIXED HYPERLIPIDEMIA: ICD-10-CM

## 2021-03-18 DIAGNOSIS — Z79.4 CONTROLLED TYPE 2 DIABETES MELLITUS WITH HYPERGLYCEMIA, WITH LONG-TERM CURRENT USE OF INSULIN (HCC): ICD-10-CM

## 2021-03-18 DIAGNOSIS — I10 ESSENTIAL HYPERTENSION: ICD-10-CM

## 2021-03-18 DIAGNOSIS — E11.65 CONTROLLED TYPE 2 DIABETES MELLITUS WITH HYPERGLYCEMIA, WITH LONG-TERM CURRENT USE OF INSULIN (HCC): ICD-10-CM

## 2021-03-18 LAB
ALBUMIN SERPL-MCNC: 3.8 G/DL (ref 3.4–5)
ALBUMIN/GLOB SERPL: 1.1 {RATIO} (ref 0.8–1.7)
ALP SERPL-CCNC: 93 U/L (ref 45–117)
ALT SERPL-CCNC: 27 U/L (ref 13–56)
ANION GAP SERPL CALC-SCNC: 7 MMOL/L (ref 3–18)
AST SERPL-CCNC: 15 U/L (ref 10–38)
BILIRUB SERPL-MCNC: 0.2 MG/DL (ref 0.2–1)
BUN SERPL-MCNC: 15 MG/DL (ref 7–18)
BUN/CREAT SERPL: 15 (ref 12–20)
CALCIUM SERPL-MCNC: 9.4 MG/DL (ref 8.5–10.1)
CHLORIDE SERPL-SCNC: 108 MMOL/L (ref 100–111)
CHOLEST SERPL-MCNC: 135 MG/DL
CK SERPL-CCNC: 130 U/L (ref 26–192)
CO2 SERPL-SCNC: 27 MMOL/L (ref 21–32)
CREAT SERPL-MCNC: 0.98 MG/DL (ref 0.6–1.3)
EST. AVERAGE GLUCOSE BLD GHB EST-MCNC: 183 MG/DL
GLOBULIN SER CALC-MCNC: 3.5 G/DL (ref 2–4)
GLUCOSE SERPL-MCNC: 75 MG/DL (ref 74–99)
HBA1C MFR BLD: 8 % (ref 4.2–5.6)
HDLC SERPL-MCNC: 50 MG/DL (ref 40–60)
HDLC SERPL: 2.7 {RATIO} (ref 0–5)
LDLC SERPL CALC-MCNC: 64.8 MG/DL (ref 0–100)
LIPID PROFILE,FLP: NORMAL
POTASSIUM SERPL-SCNC: 4.5 MMOL/L (ref 3.5–5.5)
PROT SERPL-MCNC: 7.3 G/DL (ref 6.4–8.2)
SODIUM SERPL-SCNC: 142 MMOL/L (ref 136–145)
TRIGL SERPL-MCNC: 101 MG/DL (ref ?–150)
VLDLC SERPL CALC-MCNC: 20.2 MG/DL

## 2021-03-18 PROCEDURE — 80053 COMPREHEN METABOLIC PANEL: CPT

## 2021-03-18 PROCEDURE — 83036 HEMOGLOBIN GLYCOSYLATED A1C: CPT

## 2021-03-18 PROCEDURE — 82550 ASSAY OF CK (CPK): CPT

## 2021-03-18 PROCEDURE — 80061 LIPID PANEL: CPT

## 2021-03-18 PROCEDURE — 36415 COLL VENOUS BLD VENIPUNCTURE: CPT

## 2021-03-19 NOTE — PROGRESS NOTES
Call - labs look good except A1C is up from last OV. How much Lantus is she taking? Needs to be more mindful of diet.

## 2021-03-22 DIAGNOSIS — Z79.4 TYPE 2 DIABETES MELLITUS WITH HYPERGLYCEMIA, WITH LONG-TERM CURRENT USE OF INSULIN (HCC): Primary | ICD-10-CM

## 2021-03-22 DIAGNOSIS — E11.65 TYPE 2 DIABETES MELLITUS WITH HYPERGLYCEMIA, WITH LONG-TERM CURRENT USE OF INSULIN (HCC): Primary | ICD-10-CM

## 2021-03-22 NOTE — PROGRESS NOTES
Call - need to add on another medication for her diabetes. Will see what's covered by her insurance and then send in an RX.

## 2021-03-23 ENCOUNTER — OFFICE VISIT (OUTPATIENT)
Dept: FAMILY MEDICINE CLINIC | Age: 66
End: 2021-03-23
Payer: MEDICARE

## 2021-03-23 VITALS
RESPIRATION RATE: 18 BRPM | HEART RATE: 101 BPM | WEIGHT: 216 LBS | HEIGHT: 65 IN | BODY MASS INDEX: 35.99 KG/M2 | TEMPERATURE: 98.5 F | DIASTOLIC BLOOD PRESSURE: 84 MMHG | SYSTOLIC BLOOD PRESSURE: 136 MMHG | OXYGEN SATURATION: 98 %

## 2021-03-23 DIAGNOSIS — I10 ESSENTIAL HYPERTENSION: ICD-10-CM

## 2021-03-23 DIAGNOSIS — Z79.4 TYPE 2 DIABETES MELLITUS WITH HYPERGLYCEMIA, WITH LONG-TERM CURRENT USE OF INSULIN (HCC): Primary | ICD-10-CM

## 2021-03-23 DIAGNOSIS — E11.65 TYPE 2 DIABETES MELLITUS WITH HYPERGLYCEMIA, WITH LONG-TERM CURRENT USE OF INSULIN (HCC): Primary | ICD-10-CM

## 2021-03-23 PROCEDURE — G8752 SYS BP LESS 140: HCPCS | Performed by: NURSE PRACTITIONER

## 2021-03-23 PROCEDURE — G8754 DIAS BP LESS 90: HCPCS | Performed by: NURSE PRACTITIONER

## 2021-03-23 PROCEDURE — 3017F COLORECTAL CA SCREEN DOC REV: CPT | Performed by: NURSE PRACTITIONER

## 2021-03-23 PROCEDURE — G8427 DOCREV CUR MEDS BY ELIG CLIN: HCPCS | Performed by: NURSE PRACTITIONER

## 2021-03-23 PROCEDURE — 99213 OFFICE O/P EST LOW 20 MIN: CPT | Performed by: NURSE PRACTITIONER

## 2021-03-23 PROCEDURE — G8417 CALC BMI ABV UP PARAM F/U: HCPCS | Performed by: NURSE PRACTITIONER

## 2021-03-23 PROCEDURE — 3052F HG A1C>EQUAL 8.0%<EQUAL 9.0%: CPT | Performed by: NURSE PRACTITIONER

## 2021-03-23 PROCEDURE — G9717 DOC PT DX DEP/BP F/U NT REQ: HCPCS | Performed by: NURSE PRACTITIONER

## 2021-03-23 PROCEDURE — 2022F DILAT RTA XM EVC RTNOPTHY: CPT | Performed by: NURSE PRACTITIONER

## 2021-03-23 RX ORDER — PEN NEEDLE, DIABETIC 31 GX3/16"
NEEDLE, DISPOSABLE MISCELLANEOUS
Qty: 100 PEN NEEDLE | Refills: 1 | Status: SHIPPED | OUTPATIENT
Start: 2021-03-23 | End: 2021-11-24 | Stop reason: SDUPTHER

## 2021-03-23 RX ORDER — AMITRIPTYLINE HYDROCHLORIDE 100 MG/1
TABLET, FILM COATED ORAL
Qty: 90 TAB | Refills: 1 | Status: SHIPPED | OUTPATIENT
Start: 2021-03-23 | End: 2021-06-23 | Stop reason: SDUPTHER

## 2021-03-23 RX ORDER — INSULIN GLARGINE 100 [IU]/ML
INJECTION, SOLUTION SUBCUTANEOUS
Qty: 5 ADJUSTABLE DOSE PRE-FILLED PEN SYRINGE | Refills: 3 | Status: SHIPPED | OUTPATIENT
Start: 2021-03-23 | End: 2021-06-28

## 2021-03-23 NOTE — PATIENT INSTRUCTIONS
Learning About Relief for Back Pain  What is back strain? Back strain is an injury that happens when you overstretch, or pull, a muscle in your back. You may hurt your back in an accident or when you exercise or lift something. Most back pain gets better with rest and time. You can take care of yourself at home to help your back heal.  What can you do first to relieve back pain? When you first feel back pain, try these steps:  · Walk. Take a short walk (10 to 20 minutes) on a level surface (no slopes, hills, or stairs) every 2 to 3 hours. Walk only distances you can manage without pain, especially leg pain. · Relax. Find a comfortable position for rest. Some people are comfortable on the floor or a medium-firm bed with a small pillow under their head and another under their knees. Some people prefer to lie on their side with a pillow between their knees. Don't stay in one position for too long. · Try heat or ice. Try using a heating pad on a low or medium setting, or take a warm shower, for 15 to 20 minutes every 2 to 3 hours. Or you can buy single-use heat wraps that last up to 8 hours. You can also try an ice pack for 10 to 15 minutes every 2 to 3 hours. You can use an ice pack or a bag of frozen vegetables wrapped in a thin towel. There is not strong evidence that either heat or ice will help, but you can try them to see if they help. You may also want to try switching between heat and cold. · Take pain medicine exactly as directed. ? If the doctor gave you a prescription medicine for pain, take it as prescribed. ? If you are not taking a prescription pain medicine, ask your doctor if you can take an over-the-counter medicine. What else can you do? · Stretch and exercise. Exercises that increase flexibility may relieve your pain and make it easier for your muscles to keep your spine in a good, neutral position. And don't forget to keep walking. · Do self-massage.  You can use self-massage to unwind after work or school or to energize yourself in the morning. You can easily massage your feet, hands, or neck. Self-massage works best if you are in comfortable clothes and are sitting or lying in a comfortable position. Use oil or lotion to massage bare skin. · Reduce stress. Back pain can lead to a vicious [place]: Distress about the pain tenses the muscles in your back, which in turn causes more pain. Learn how to relax your mind and your muscles to lower your stress. Where can you learn more? Go to http://www.gray.com/  Enter Q517 in the search box to learn more about \"Learning About Relief for Back Pain. \"  Current as of: March 2, 2020               Content Version: 12.6  © 7209-8219 [employer]. Care instructions adapted under license by [employer] (which disclaims liability or warranty for this information). If you have questions about a medical condition or this instruction, always ask your healthcare professional. [place] [address] any warranty or liability for your use of this information. Low Back Pain: Exercises  Introduction  Here are some examples of exercises for you to try. The exercises may be suggested for a condition or for rehabilitation. Start each exercise slowly. Ease off the exercises if you start to have pain. You will be told when to start these exercises and which ones will work best for you. How to do the exercises  Press-up   1. Lie on your stomach, supporting your body with your forearms. 2. Press your elbows down into the floor to raise your upper back. As you do this, relax your stomach muscles and allow your back to arch without using your back muscles. As your press up, do not let your hips or pelvis come off the floor. 3. Hold for 15 to 30 seconds, then relax. 4. Repeat 2 to 4 times. Alternate arm and leg (bird dog) exercise   Do this exercise slowly.  Try to keep your body straight at all times, and do not let one hip drop lower than the other. 1. Start on the floor, on your hands and knees. 2. Tighten your belly muscles. 3. Raise one leg off the floor, and hold it straight out behind you. Be careful not to let your hip drop down, because that will twist your trunk. 4. Hold for about 6 seconds, then lower your leg and switch to the other leg. 5. Repeat 8 to 12 times on each leg. 6. Over time, work up to holding for 10 to 30 seconds each time. 7. If you feel stable and secure with your leg raised, try raising the opposite arm straight out in front of you at the same time. Knee-to-chest exercise   1. Lie on your back with your knees bent and your feet flat on the floor. 2. Bring one knee to your chest, keeping the other foot flat on the floor (or keeping the other leg straight, whichever feels better on your lower back). 3. Keep your lower back pressed to the floor. Hold for at least 15 to 30 seconds. 4. Relax, and lower the knee to the starting position. 5. Repeat with the other leg. Repeat 2 to 4 times with each leg. 6. To get more stretch, put your other leg flat on the floor while pulling your knee to your chest.    Curl-ups   1. Lie on the floor on your back with your knees bent at a 90-degree angle. Your feet should be flat on the floor, about 12 inches from your buttocks. 2. Cross your arms over your chest. If this bothers your neck, try putting your hands behind your neck (not your head), with your elbows spread apart. 3. Slowly tighten your belly muscles and raise your shoulder blades off the floor. 4. Keep your head in line with your body, and do not press your chin to your chest.  5. Hold this position for 1 or 2 seconds, then slowly lower yourself back down to the floor. 6. Repeat 8 to 12 times. Pelvic tilt exercise   1. Lie on your back with your knees bent. 2. \"Brace\" your stomach.  This means to tighten your muscles by pulling in and imagining your belly button moving toward your spine. You should feel like your back is pressing to the floor and your hips and pelvis are rocking back. 3. Hold for about 6 seconds while you breathe smoothly. 4. Repeat 8 to 12 times. Heel dig bridging   1. Lie on your back with both knees bent and your ankles bent so that only your heels are digging into the floor. Your knees should be bent about 90 degrees. 2. Then push your heels into the floor, squeeze your buttocks, and lift your hips off the floor until your shoulders, hips, and knees are all in a straight line. 3. Hold for about 6 seconds as you continue to breathe normally, and then slowly lower your hips back down to the floor and rest for up to 10 seconds. 4. Do 8 to 12 repetitions. Hamstring stretch in doorway   1. Lie on your back in a doorway, with one leg through the open door. 2. Slide your leg up the wall to straighten your knee. You should feel a gentle stretch down the back of your leg. 3. Hold the stretch for at least 15 to 30 seconds. Do not arch your back, point your toes, or bend either knee. Keep one heel touching the floor and the other heel touching the wall. 4. Repeat with your other leg. 5. Do 2 to 4 times for each leg. Hip flexor stretch   1. Kneel on the floor with one knee bent and one leg behind you. Place your forward knee over your foot. Keep your other knee touching the floor. 2. Slowly push your hips forward until you feel a stretch in the upper thigh of your rear leg. 3. Hold the stretch for at least 15 to 30 seconds. Repeat with your other leg. 4. Do 2 to 4 times on each side. Wall sit   1. Stand with your back 10 to 12 inches away from a wall. 2. Lean into the wall until your back is flat against it. 3. Slowly slide down until your knees are slightly bent, pressing your lower back into the wall. 4. Hold for about 6 seconds, then slide back up the wall. 5. Repeat 8 to 12 times.     Follow-up care is a key part of your treatment and safety. Be sure to make and go to all appointments, and call your doctor if you are having problems. It's also a good idea to know your test results and keep a list of the medicines you take. Where can you learn more? Go to http://www.gray.com/  Enter H714 in the search box to learn more about \"Low Back Pain: Exercises. \"  Current as of: March 2, 2020               Content Version: 12.6  © 2006-2020 [employer]. Care instructions adapted under license by [employer] (which disclaims liability or warranty for this information). If you have questions about a medical condition or this instruction, always ask your healthcare professional. [address] any warranty or liability for your use of this information.

## 2021-03-23 NOTE — TELEPHONE ENCOUNTER
Future Appointments   Date Time Provider Kapil Sky   3/23/2021  3:15 PM Ifrah Cervantes NP [facility]     Last written 9/28/2020.

## 2021-03-23 NOTE — PROGRESS NOTES
HPI  Da Benítez is a 61y.o. year old female who presents today for follow up for diabetes. Has not been good about her diet lately, has been eating out with friends a lot. Not exercising at all. She is compliant with her medications. Checks her feet regularly. Does not know when her last diabetic eye exam was. Would like to try to get her A1C down on her own before being started on another medication. Past Medical History:   Diagnosis Date    Cataract     CVA, old, cognitive deficits     Depression 1/13/2015    Diabetes ([address]     Diabetic neuropathy ([address] 9/1/2013    Diabetic retinopathy ([address] 3/2/2012    DM (diabetes mellitus) ([address] 3/2/2012    GERD (gastroesophageal reflux disease) 4/21/2011    Glaucoma     Heel spur 11/24/2014    HTN (hypertension) 4/21/2011    Hyperlipidemia 3/2/2012    Insomnia 4/21/2011    Kidney stone     Obesity (BMI 30-39. 9) 7/16/2018    Retained ureteral stent     Schizophrenia ([address] 4/21/2011    Sleep apnea, obstructive 9/1/2013    Stroke ([address] 08/2018    pt states her Neurologist diagnosed her with stroke in August    Urinary frequency         Past Surgical History:   Procedure Laterality Date    HX OPEN CHOLECYSTECTOMY      HX UROLOGICAL Left 09/2017    Cystoscopy, Left retrograde pyelography, Left ureteroscopic stone extraction with laser lithotripsy and basket stone extraction,Left 6 x 24 cm double-J ureteral stent placement, Interpretation of urography. [facility] Dr [facility]  [facility]    left benign       Social History     Tobacco Use    Smoking status: Current Every Day Smoker     Packs/day: 0.50     Years: 21.00     Pack years: 10.50     Types: Cigarettes    Smokeless tobacco: Former User   Substance Use Topics    Alcohol use: No    Drug use: No         Current Outpatient Medications:     SITagliptin (Januvia) 100 mg tablet, Take 1 Tab by mouth daily.  Indications: type 2 diabetes mellitus, Disp: 30 Tab, Rfl: 3    bimatoprost (Lumigan) 0.01 % ophthalmic drops, Administer 1 Drop to both eyes nightly., Disp: , Rfl:     mineral oil/petrolatum,white (OVERNIGHT LUBRICATING EYE OP), Apply  to eye nightly., Disp: , Rfl:     Insulin Needles, Disposable, (Rosalva Pen Needle) 32 gauge x 5/32\" ndle, AS DIRECTED ONCE A DAY, Disp: 100 Pen Needle, Rfl: 1    simvastatin (ZOCOR) 10 mg tablet, Take 1 Tab by mouth nightly., Disp: 90 Tab, Rfl: 1    meloxicam (MOBIC) 15 mg tablet, TAKE 1 TABLET BY MOUTH EVERY DAY, Disp: 90 Tab, Rfl: 1    pantoprazole (PROTONIX) 40 mg tablet, TAKE 1 TABLET BY MOUTH EVERY DAY, Disp: 90 Tab, Rfl: 1    amLODIPine (NORVASC) 10 mg tablet, TAKE 1 TABLET BY MOUTH DAILY, Disp: 90 Tab, Rfl: 1    QUEtiapine (SEROquel) 50 mg tablet, Take 1 Tab by mouth every evening. TAKE 1 TABLET BY MOUTH EVERY EVENING AS NEEDED, Disp: 90 Tab, Rfl: 1    glipiZIDE SR (GLUCOTROL XL) 10 mg CR tablet, Take 1 Tab by mouth two (2) times a day., Disp: 180 Tab, Rfl: 1    amitriptyline (ELAVIL) 100 mg tablet, TAKE 1 TABLET BY MOUTH EVERY NIGHT. GENERIC FOR ELAVIL., Disp: 90 Tab, Rfl: 1    multivitamin (ONE A DAY) tablet, Take 1 Tab by mouth daily. , Disp: , Rfl:     aspirin delayed-release 81 mg tablet, Take  by mouth daily. , Disp: , Rfl:     ALCOHOL PREP PADS padm, USE TWICE DAILY AS DIRECTED, Disp: 200 Pad, Rfl: 1    Ferrous Sulfate (SLOW FE) 47.5 mg iron TbER tablet, Take 1 Tab by mouth daily. , Disp: 90 Tab, Rfl: 1    Blood-Glucose Meter (CONTOUR METER) monitoring kit, DX E11.9, Disp: 1 Kit, Rfl: 0    cholecalciferol, vitamin d3, (VITAMIN D) 1,000 unit tablet, Take  by mouth two (2) times a day., Disp: , Rfl:      Allergies   Allergen Reactions    Amaryl [Glimepiride] Nausea Only    Penicillins Nausea and Vomiting       Review of Systems   Constitutional: Negative for chills, fever, malaise/fatigue and weight loss. HENT: Negative for congestion, ear pain, hearing loss, sinus pain, sore throat and tinnitus.     Eyes: Negative for blurred vision, double vision, photophobia and pain. Respiratory: Negative for cough and shortness of breath. Cardiovascular: Negative for chest pain, palpitations and leg swelling. Gastrointestinal: Negative for abdominal pain, constipation, diarrhea, heartburn, nausea and vomiting. Genitourinary: Negative for dysuria, frequency and urgency. Musculoskeletal: Negative for back pain, joint pain and myalgias. Skin: Negative for rash. Neurological: Negative for dizziness and headaches. Psychiatric/Behavioral: Negative for depression and suicidal ideas. The patient is not nervous/anxious. PE  /84   Pulse (!) 101   Temp 98.5 °F (36.9 °C) (Temporal)   Resp 18   Ht 5' 5\" (1.651 m)   Wt 216 lb (98 kg)   SpO2 98%   BMI 35.94 kg/m²     Physical Exam  Vitals signs reviewed. Constitutional:       General: She is not in acute distress. Appearance: Normal appearance. HENT:      Head: Normocephalic and atraumatic. Cardiovascular:      Rate and Rhythm: Normal rate and regular rhythm. Heart sounds: S1 normal and S2 normal. No murmur. No friction rub. No gallop. No S3 or S4 sounds. Pulmonary:      Effort: Pulmonary effort is normal.      Breath sounds: Normal breath sounds. No wheezing, rhonchi or rales. Musculoskeletal:      Right lower leg: No edema. Left lower leg: No edema. Feet:      Right foot:      Protective Sensation: 3 sites tested. 3 sites sensed. Left foot:      Protective Sensation: 3 sites tested. 3 sites sensed. Skin:     General: Skin is dry. Capillary Refill: Capillary refill takes less than 2 seconds. Findings: No signs of injury, lesion or rash. Neurological:      Mental Status: She is alert and oriented to person, place, and time. Assessment/Plan  1.  T2DM  Continue current medications now  Strongly emphasized the importance of diet, exercise and weight loss  Repeat A1C in 3 months, if still at 8% or above will start additional medication    2.  HTN  BP OK  Work on salt and caffeine intake  Weight loss  Consider medication adjustment if still up in 3 months

## 2021-03-23 NOTE — PROGRESS NOTES
Lindy Lipscmob is a 61 y.o. female (: 1955) presenting to address:    Chief Complaint   Patient presents with    Diabetes    Hypertension    Back Pain     low back since december        Vitals:    21 1501   BP: 136/84   Pulse: (!) 101   Resp: 18   Temp: 98.5 °F (36.9 °C)   TempSrc: Temporal   SpO2: 98%   Weight: 216 lb (98 kg)   Height: 5' 5\" (1.651 m)   PainSc:   0 - No pain   PainLoc: Back       Hearing/Vision:   No exam data present    Learning Assessment:     Learning Assessment 3/10/2014   PRIMARY LEARNER Patient   PRIMARY LANGUAGE ENGLISH   LEARNER PREFERENCE PRIMARY OTHER (COMMENT)   ANSWERED BY patient   RELATIONSHIP SELF     Depression Screening:     3 most recent PHQ Screens 2021   PHQ Not Done -   Little interest or pleasure in doing things Not at all   Feeling down, depressed, irritable, or hopeless Not at all   Total Score PHQ 2 0     Fall Risk Assessment:     Fall Risk Assessment, last 12 mths 2021   Able to walk? Yes   Fall in past 12 months? 0   Do you feel unsteady? 0   Are you worried about falling 0     Abuse Screening:     Abuse Screening Questionnaire 2021   Do you ever feel afraid of your partner? N   Are you in a relationship with someone who physically or mentally threatens you? N   Is it safe for you to go home? Y     Coordination of Care Questionaire:   1. Have you been to the ER, urgent care clinic since your last visit? Hospitalized since your last visit? NO    2. Have you seen or consulted any other health care providers outside of the [address] [place] [address] since your last visit? Include any pap smears or colon screening. NO    Advanced Directive:   1. Do you have an Advanced Directive? NO    2. Would you like information on Advanced Directives?  NO

## 2021-04-19 ENCOUNTER — HOSPITAL ENCOUNTER (OUTPATIENT)
Dept: NUTRITION | Age: 66
Discharge: HOME | End: 2021-04-19
Payer: MEDICARE

## 2021-04-19 DIAGNOSIS — E11.9 DIABETES MELLITUS WITHOUT COMPLICATION (HCC): ICD-10-CM

## 2021-04-19 PROCEDURE — 97802 MEDICAL NUTRITION INDIV IN: CPT

## 2021-04-19 NOTE — TELEPHONE ENCOUNTER
Pt called in refills. Requested Prescriptions     Pending Prescriptions Disp Refills    QUEtiapine (SEROquel) 50 mg tablet 90 Tab 1     Sig: Take 1 Tab by mouth every evening. TAKE 1 TABLET BY MOUTH EVERY EVENING AS NEEDED    glipiZIDE SR (GLUCOTROL XL) 10 mg CR tablet 180 Tab 1     Sig: Take 1 Tab by mouth two (2) times a day.

## 2021-04-19 NOTE — TELEPHONE ENCOUNTER
Last refilled Glipizide 10/16/20 for 180 tabs with 1 refills . Seroquel last refilled same day for 90 with 1 .  Last OV 3/23/21   Future Appointments   Date Time Provider Kapil Sky   6/23/2021  1:30 PM MINGO Abbasi BS AMB

## 2021-04-21 NOTE — TELEPHONE ENCOUNTER
Left message for patient to call office .  See Risa's message what does she take Seroquel for and does she have a psych Dr ?

## 2021-04-21 NOTE — TELEPHONE ENCOUNTER
What is she on Seroquel for?  I'm not terribly comfortable prescribing that, does she see a psychiatrist?

## 2021-04-22 NOTE — TELEPHONE ENCOUNTER
Pt says that she takes the seroquel for depression and used to see a psych doctor, but no longer does.

## 2021-04-23 ENCOUNTER — TELEPHONE (OUTPATIENT)
Dept: FAMILY MEDICINE CLINIC | Age: 66
End: 2021-04-23

## 2021-04-23 NOTE — TELEPHONE ENCOUNTER
Patient called stating that she called yesterday needing a medication refill. She went to the pharmacy and they told her that nothing was sent in from the doctors office. She states she just needs her medications Quetiapine and Glipizide.  Please advise

## 2021-04-26 RX ORDER — QUETIAPINE FUMARATE 50 MG/1
50 TABLET, FILM COATED ORAL EVERY EVENING
Qty: 90 TAB | Refills: 1 | Status: SHIPPED | OUTPATIENT
Start: 2021-04-26 | End: 2021-10-19

## 2021-04-26 RX ORDER — GLIPIZIDE 10 MG/1
10 TABLET, FILM COATED, EXTENDED RELEASE ORAL 2 TIMES DAILY
Qty: 180 TAB | Refills: 1 | Status: SHIPPED | OUTPATIENT
Start: 2021-04-26 | End: 2021-10-19

## 2021-04-26 NOTE — TELEPHONE ENCOUNTER
Will refill as long as her symptoms are stable. If this changes she will need to see psychiatry. Also, she should be having a slit lamp eye exam with an ophthalmologist every 6 months because Seroquel can cause cataracts. This will be a condition for continual refills.

## 2021-05-28 NOTE — TELEPHONE ENCOUNTER
Last Ov 3/23/21 .  Last refilled 12/1/20 for 90 with 1   Future Appointments   Date Time Provider Kapil Sky   6/23/2021  1:30 PM MINGO DiehlMA BS AMB

## 2021-05-28 NOTE — TELEPHONE ENCOUNTER
Patient called requesting medication refill, please advise    Requested Prescriptions     Pending Prescriptions Disp Refills    meloxicam (MOBIC) 15 mg tablet 90 Tablet 1     Sig: TAKE 1 TABLET BY MOUTH EVERY DAY

## 2021-06-01 RX ORDER — MELOXICAM 15 MG/1
TABLET ORAL
Qty: 90 TABLET | Refills: 1 | Status: SHIPPED | OUTPATIENT
Start: 2021-06-01 | End: 2021-11-26

## 2021-06-23 ENCOUNTER — OFFICE VISIT (OUTPATIENT)
Dept: FAMILY MEDICINE CLINIC | Age: 66
End: 2021-06-23
Payer: MEDICARE

## 2021-06-23 VITALS
WEIGHT: 208 LBS | RESPIRATION RATE: 16 BRPM | TEMPERATURE: 98.2 F | HEART RATE: 106 BPM | BODY MASS INDEX: 34.66 KG/M2 | DIASTOLIC BLOOD PRESSURE: 65 MMHG | HEIGHT: 65 IN | SYSTOLIC BLOOD PRESSURE: 104 MMHG | OXYGEN SATURATION: 98 %

## 2021-06-23 DIAGNOSIS — Z79.4 TYPE 2 DIABETES MELLITUS WITH DIABETIC POLYNEUROPATHY, WITH LONG-TERM CURRENT USE OF INSULIN (HCC): Primary | ICD-10-CM

## 2021-06-23 DIAGNOSIS — E11.42 TYPE 2 DIABETES MELLITUS WITH DIABETIC POLYNEUROPATHY, WITH LONG-TERM CURRENT USE OF INSULIN (HCC): Primary | ICD-10-CM

## 2021-06-23 LAB — HBA1C MFR BLD HPLC: 7 %

## 2021-06-23 PROCEDURE — 2022F DILAT RTA XM EVC RTNOPTHY: CPT | Performed by: NURSE PRACTITIONER

## 2021-06-23 PROCEDURE — G8427 DOCREV CUR MEDS BY ELIG CLIN: HCPCS | Performed by: NURSE PRACTITIONER

## 2021-06-23 PROCEDURE — 3017F COLORECTAL CA SCREEN DOC REV: CPT | Performed by: NURSE PRACTITIONER

## 2021-06-23 PROCEDURE — G8752 SYS BP LESS 140: HCPCS | Performed by: NURSE PRACTITIONER

## 2021-06-23 PROCEDURE — G8417 CALC BMI ABV UP PARAM F/U: HCPCS | Performed by: NURSE PRACTITIONER

## 2021-06-23 PROCEDURE — 3051F HG A1C>EQUAL 7.0%<8.0%: CPT | Performed by: NURSE PRACTITIONER

## 2021-06-23 PROCEDURE — 83036 HEMOGLOBIN GLYCOSYLATED A1C: CPT | Performed by: NURSE PRACTITIONER

## 2021-06-23 PROCEDURE — G9717 DOC PT DX DEP/BP F/U NT REQ: HCPCS | Performed by: NURSE PRACTITIONER

## 2021-06-23 PROCEDURE — G8754 DIAS BP LESS 90: HCPCS | Performed by: NURSE PRACTITIONER

## 2021-06-23 PROCEDURE — 99213 OFFICE O/P EST LOW 20 MIN: CPT | Performed by: NURSE PRACTITIONER

## 2021-06-23 NOTE — PROGRESS NOTES
Benigno Burrell is a 61 y.o. female (: 1955) presenting to address:    Chief Complaint   Patient presents with    Diabetes     need refill on insulin .  Vision Change     after using eye drops for about an hour . Vitals:    21 1305   BP: 104/65   Pulse: (!) 106   Resp: 16   Temp: 98.2 °F (36.8 °C)   TempSrc: Temporal   SpO2: 98%   Weight: 208 lb (94.3 kg)   Height: 5' 5\" (1.651 m)   PainSc:   0 - No pain       Hearing/Vision:   No exam data present    Learning Assessment:     Learning Assessment 3/10/2014   PRIMARY LEARNER Patient   PRIMARY LANGUAGE ENGLISH   LEARNER PREFERENCE PRIMARY OTHER (COMMENT)   ANSWERED BY patient   RELATIONSHIP SELF     Depression Screening:     3 most recent PHQ Screens 2021   PHQ Not Done -   Little interest or pleasure in doing things Not at all   Feeling down, depressed, irritable, or hopeless Not at all   Total Score PHQ 2 0     Fall Risk Assessment:     Fall Risk Assessment, last 12 mths 2021   Able to walk? Yes   Fall in past 12 months? 0   Do you feel unsteady? 0   Are you worried about falling 0     Abuse Screening:     Abuse Screening Questionnaire 2021   Do you ever feel afraid of your partner? N   Are you in a relationship with someone who physically or mentally threatens you? N   Is it safe for you to go home? Y     Coordination of Care Questionaire:   1. Have you been to the ER, urgent care clinic since your last visit? Hospitalized since your last visit? NO    2. Have you seen or consulted any other health care providers outside of the [address] [place] [address] since your last visit? Include any pap smears or colon screening. YES    Advanced Directive:   1. Do you have an Advanced Directive? NO    2. Would you like information on Advanced Directives?  NO

## 2021-06-23 NOTE — TELEPHONE ENCOUNTER
Patient was told to return call and tell us what prescriptions she needed filled . Requested Prescriptions     Pending Prescriptions Disp Refills    amitriptyline (ELAVIL) 100 mg tablet 90 Tablet 1     Sig: TAKE 1 TABLET BY MOUTH EVERY NIGHT. GENERIC FOR ELAVIL.     pantoprazole (PROTONIX) 40 mg tablet 90 Tablet 1     Sig: TAKE 1 TABLET BY MOUTH EVERY DAY

## 2021-06-24 RX ORDER — PANTOPRAZOLE SODIUM 40 MG/1
TABLET, DELAYED RELEASE ORAL
Qty: 90 TABLET | Refills: 1 | Status: SHIPPED | OUTPATIENT
Start: 2021-06-24 | End: 2021-12-15

## 2021-06-24 RX ORDER — AMITRIPTYLINE HYDROCHLORIDE 100 MG/1
TABLET, FILM COATED ORAL
Qty: 90 TABLET | Refills: 1 | Status: SHIPPED | OUTPATIENT
Start: 2021-06-24 | End: 2021-09-20

## 2021-06-24 NOTE — TELEPHONE ENCOUNTER
Last refilled 12/1/20 for 90 with 1 . Amitriptyline 3/23/21 90 with 1 .  Last Ov 6/23/21   Future Appointments   Date Time Provider Kapil Sky   9/7/2021  1:30 PM MINGO Zapata BS AMB

## 2021-06-28 RX ORDER — INSULIN GLARGINE 100 [IU]/ML
INJECTION, SOLUTION SUBCUTANEOUS
Qty: 15 ML | Refills: 3 | Status: SHIPPED | OUTPATIENT
Start: 2021-06-28 | End: 2021-10-12

## 2021-06-30 NOTE — PROGRESS NOTES
HPI  Samm Raymundo is a 61y.o. year old female who presents today for follow up for diabetes. She has been working on her diet and is exercising more. She is not eating out as much and eating healthier. Trying to be more physically active. Has diabetic eye exam appointment coming up. Past Medical History:   Diagnosis Date    Cataract     CVA, old, cognitive deficits     Depression 1/13/2015    Diabetes ([address]     Diabetic neuropathy ([address] 9/1/2013    Diabetic retinopathy ([address] 3/2/2012    DM (diabetes mellitus) ([address] 3/2/2012    GERD (gastroesophageal reflux disease) 4/21/2011    Glaucoma     Heel spur 11/24/2014    HTN (hypertension) 4/21/2011    Hyperlipidemia 3/2/2012    Insomnia 4/21/2011    Kidney stone     Obesity (BMI 30-39. 9) 7/16/2018    Retained ureteral stent     Schizophrenia ([address] 4/21/2011    Sleep apnea, obstructive 9/1/2013    Stroke ([address] 08/2018    pt states her Neurologist diagnosed her with stroke in August    Urinary frequency        Past Surgical History:   Procedure Laterality Date    HX OPEN CHOLECYSTECTOMY      HX UROLOGICAL Left 09/2017    Cystoscopy, Left retrograde pyelography, Left ureteroscopic stone extraction with laser lithotripsy and basket stone extraction,Left 6 x 24 cm double-J ureteral stent placement, Interpretation of urography. [address] [facility] - Dr Agnes Torres, INCISIONAL  1970    left benign       Social History     Tobacco Use    Smoking status: Current Every Day Smoker     Packs/day: 0.50     Years: 21.00     Pack years: 10.50     Types: Cigarettes    Smokeless tobacco: Former User   Substance Use Topics    Alcohol use: No    Drug use: No         Current Outpatient Medications:     meloxicam (MOBIC) 15 mg tablet, TAKE 1 TABLET BY MOUTH EVERY DAY, Disp: 90 Tablet, Rfl: 1    QUEtiapine (SEROquel) 50 mg tablet, Take 1 Tab by mouth every evening.  TAKE 1 TABLET BY MOUTH EVERY EVENING AS NEEDED, Disp: 90 Tab, Rfl: 1   glipiZIDE SR (GLUCOTROL XL) 10 mg CR tablet, Take 1 Tab by mouth two (2) times a day., Disp: 180 Tab, Rfl: 1    Insulin Needles, Disposable, (Rosalva Pen Needle) 32 gauge x 5/32\" ndle, AS DIRECTED ONCE A DAY, Disp: 100 Pen Needle, Rfl: 1    SITagliptin (Januvia) 100 mg tablet, Take 1 Tab by mouth daily. Indications: type 2 diabetes mellitus, Disp: 30 Tab, Rfl: 3    bimatoprost (Lumigan) 0.01 % ophthalmic drops, Administer 1 Drop to both eyes nightly., Disp: , Rfl:     mineral oil/petrolatum,white (OVERNIGHT LUBRICATING EYE OP), Apply  to eye nightly., Disp: , Rfl:     simvastatin (ZOCOR) 10 mg tablet, Take 1 Tab by mouth nightly., Disp: 90 Tab, Rfl: 1    amLODIPine (NORVASC) 10 mg tablet, TAKE 1 TABLET BY MOUTH DAILY, Disp: 90 Tab, Rfl: 1    multivitamin (ONE A DAY) tablet, Take 1 Tab by mouth daily. , Disp: , Rfl:     aspirin delayed-release 81 mg tablet, Take  by mouth daily. , Disp: , Rfl:     ALCOHOL PREP PADS padm, USE TWICE DAILY AS DIRECTED, Disp: 200 Pad, Rfl: 1    Ferrous Sulfate (SLOW FE) 47.5 mg iron TbER tablet, Take 1 Tab by mouth daily. , Disp: 90 Tab, Rfl: 1    Blood-Glucose Meter (CONTOUR METER) monitoring kit, DX E11.9, Disp: 1 Kit, Rfl: 0    cholecalciferol, vitamin d3, (VITAMIN D) 1,000 unit tablet, Take  by mouth two (2) times a day., Disp: , Rfl:     insulin glargine (Lantus Solostar U-100 Insulin) 100 unit/mL (3 mL) inpn, ADMINISTER 55 UNITS UNDER THE SKIN EVERY NIGHT AT BEDTIME, Disp: 15 mL, Rfl: 3    amitriptyline (ELAVIL) 100 mg tablet, TAKE 1 TABLET BY MOUTH EVERY NIGHT. GENERIC FOR ELAVIL., Disp: 90 Tablet, Rfl: 1    pantoprazole (PROTONIX) 40 mg tablet, TAKE 1 TABLET BY MOUTH EVERY DAY, Disp: 90 Tablet, Rfl: 1     Allergies   Allergen Reactions    Amaryl [Glimepiride] Nausea Only    Penicillins Nausea and Vomiting       Review of Systems   Constitutional: Negative for chills, fever and malaise/fatigue. Eyes: Negative for blurred vision and double vision.    Respiratory: Negative for cough and shortness of breath. Cardiovascular: Negative for chest pain, palpitations and leg swelling. Gastrointestinal: Negative for abdominal pain, diarrhea, nausea and vomiting. Genitourinary: Negative for frequency. Neurological: Negative for dizziness and headaches. Endo/Heme/Allergies: Negative for polydipsia. PE  /65   Pulse (!) 106   Temp 98.2 °F (36.8 °C) (Temporal)   Resp 16   Ht 5' 5\" (1.651 m)   Wt 208 lb (94.3 kg)   SpO2 98%   BMI 34.61 kg/m²     Physical Exam  Vitals reviewed. Constitutional:       General: She is not in acute distress. Appearance: Normal appearance. HENT:      Head: Normocephalic and atraumatic. Cardiovascular:      Rate and Rhythm: Normal rate and regular rhythm. Heart sounds: S1 normal and S2 normal. No murmur heard. No friction rub. No gallop. No S3 or S4 sounds. Pulmonary:      Effort: Pulmonary effort is normal.      Breath sounds: Normal breath sounds. No wheezing, rhonchi or rales. Musculoskeletal:      Right lower leg: No edema. Left lower leg: No edema. Feet:      Right foot:      Skin integrity: Skin integrity normal.      Left foot:      Skin integrity: Skin integrity normal.   Skin:     General: Skin is warm and dry. Capillary Refill: Capillary refill takes less than 2 seconds. Neurological:      Mental Status: She is alert and oriented to person, place, and time. Assessment/Plan  1.  T2DM  A1C 7 (down from 8)  Continue current medications  Continue dietary and lifestyle modifications  Recheck in 4 months along with fasting labs

## 2021-07-22 DIAGNOSIS — I10 ESSENTIAL HYPERTENSION: ICD-10-CM

## 2021-07-22 DIAGNOSIS — E11.9 DIABETES MELLITUS WITHOUT COMPLICATION (HCC): ICD-10-CM

## 2021-07-22 RX ORDER — GLIPIZIDE 10 MG/1
10 TABLET, FILM COATED, EXTENDED RELEASE ORAL 2 TIMES DAILY
Qty: 180 TABLET | Refills: 1 | Status: CANCELLED | OUTPATIENT
Start: 2021-07-22

## 2021-07-22 RX ORDER — AMLODIPINE BESYLATE 10 MG/1
TABLET ORAL
Qty: 90 TABLET | Refills: 1 | Status: SHIPPED | OUTPATIENT
Start: 2021-07-22 | End: 2021-12-15

## 2021-07-22 RX ORDER — QUETIAPINE FUMARATE 50 MG/1
50 TABLET, FILM COATED ORAL EVERY EVENING
Qty: 90 TABLET | Refills: 1 | Status: CANCELLED | OUTPATIENT
Start: 2021-07-22

## 2021-07-22 RX ORDER — INSULIN GLARGINE 100 [IU]/ML
INJECTION, SOLUTION SUBCUTANEOUS
Qty: 15 ML | Refills: 3 | Status: CANCELLED | OUTPATIENT
Start: 2021-07-22

## 2021-08-13 NOTE — PROGRESS NOTES
Patient called back, wasn't able to take it but called back. Patient says she is still taking simvastatin, but does not remember when it was last filled and she is not sure if she gets 30 or 90-day supply of medications. Called pharmacy to verify when simvastatin prescription was last picked up. Unable to speak to someone, will follow-up on 08/17.      Madeline Headley, PharmD  PGY1 Pharmacy Resident  [facility] Rotation  Department, toll free: [phone], option 2

## 2021-08-13 NOTE — PROGRESS NOTES
CLINICAL PHARMACY: ADHERENCE REVIEW  Identified care gap per Amandeep; fills at [facility]: Statin adherence    Last Visit: 06/23/2021    Patient identified as LIS = 1, therefore patient may be able to receive a 90-day supply for the same cost as a 30-day supply    Patient also appears to be prescribed: DM    Patient not found in Outcomes MTM    Nghia Smith [address]    Per Reconciled Dispense Report:   - Lantus last dispensed on 05/31/2021 for 27 day supply. - Glipizide last dispensed on 04/26/2021 for 90 day supply. Per chart review:   - Lantus last ordered on 06/28/2021 for 20 day supply. Lab Results   Component Value Date/Time    Hemoglobin A1c (POC) 7.0 06/23/2021 01:17 PM     STATIN ADHERENCE    Per Insurance Records through 08/09/2021 (Potential Fail Date: 08/19/2021):   - Simvastatin 10 mg DAILY last filled on 03/26/2021 for 90 day supply. Next refill due: 06/24/2021    Lab Results   Component Value Date/Time    Cholesterol, total 135 03/18/2021 03:07 PM    HDL Cholesterol 50 03/18/2021 03:07 PM    LDL, calculated 64.8 03/18/2021 03:07 PM    VLDL, calculated 20.2 03/18/2021 03:07 PM    Triglyceride 101 03/18/2021 03:07 PM    CHOL/HDL Ratio 2.7 03/18/2021 03:07 PM     ALT (SGPT)   Date Value Ref Range Status   03/18/2021 27 13 - 56 U/L Final     AST (SGOT)   Date Value Ref Range Status   03/18/2021 15 10 - 38 U/L Final     PLAN  The following are interventions that have been identified:  - Patient overdue refilling simvastatin 10 mg and active on home medication list   - Verify glipizide, insulin and simvastatin use with patient    Attempting to reach patient to review.  Left message asking for return call. Will follow-up on 08/18 or 08/19.      Future Appointments   Date Time Provider Kapil Sky   9/7/2021  1:30 PM MINGO William BS RAY Santoyo, PharmD  PGY1 Pharmacy Resident  [facility] Rotation  Department, [facility] free: [phone], option 2

## 2021-08-18 RX ORDER — SIMVASTATIN 10 MG/1
10 TABLET, FILM COATED ORAL
Qty: 90 TABLET | Refills: 5 | Status: SHIPPED | OUTPATIENT
Start: 2021-08-18 | End: 2021-12-07 | Stop reason: SDUPTHER

## 2021-08-18 NOTE — PROGRESS NOTES
Chidi Corona NP,     Per patient request, pended simvastatin 10 mg order for your approval. Please let me know if I can further assist, or if you would like me to try to reach patient prior to (or after) appt to discuss any further. Thanks!     Timothy Kimble, PharmD  PGY1 Pharmacy Resident  [facility] Rotation  Department, toll free: [phone], option 2

## 2021-08-18 NOTE — PROGRESS NOTES
Called pharmacy to verify when simvastatin 10 mg prescription was last filled. Pharmacist confirmed that it was last filled on 03/26/2021 for 90-day supply. Called patient to ask if they'd like an updated prescription, which she agreed to. Will pend updated Rx to PCP.      Fede Peres, PharmD  PGY1 Pharmacy Resident  [facility] Rotation  Department, toll free: [phone], option 2

## 2021-08-19 ENCOUNTER — TELEPHONE (OUTPATIENT)
Dept: PHARMACY | Age: 66
End: 2021-08-19

## 2021-08-19 NOTE — PROGRESS NOTES
Confirmed that PCP authorized simvastatin 10 mg script. Thank you!     Latanya Cote, PharmD  PGY1 Pharmacy Resident  [facility] Rotation  Department, toll free: [phone], option 2    =========================================================================    For Pharmacy [address] in place: No   Recommendation Provided To: Provider: 1 via Note to Provider  and Patient/Caregiver: 1 via Telephone   Intervention Detail: Adherence Monitorin   Gap Closed?: Yes   Intervention Accepted By: Provider: 1 and Patient/Caregiver: 1   Time Spent (min): 10

## 2021-09-07 ENCOUNTER — OFFICE VISIT (OUTPATIENT)
Dept: FAMILY MEDICINE CLINIC | Age: 66
End: 2021-09-07
Payer: MEDICARE

## 2021-09-07 VITALS
DIASTOLIC BLOOD PRESSURE: 79 MMHG | OXYGEN SATURATION: 97 % | HEART RATE: 103 BPM | TEMPERATURE: 98 F | BODY MASS INDEX: 34.66 KG/M2 | RESPIRATION RATE: 16 BRPM | SYSTOLIC BLOOD PRESSURE: 128 MMHG | WEIGHT: 208 LBS | HEIGHT: 65 IN

## 2021-09-07 DIAGNOSIS — Z12.12 SCREENING FOR COLORECTAL CANCER: ICD-10-CM

## 2021-09-07 DIAGNOSIS — E11.42 TYPE 2 DIABETES MELLITUS WITH DIABETIC POLYNEUROPATHY, WITH LONG-TERM CURRENT USE OF INSULIN (HCC): Primary | ICD-10-CM

## 2021-09-07 DIAGNOSIS — Z79.4 TYPE 2 DIABETES MELLITUS WITH DIABETIC POLYNEUROPATHY, WITH LONG-TERM CURRENT USE OF INSULIN (HCC): Primary | ICD-10-CM

## 2021-09-07 DIAGNOSIS — G47.00 INSOMNIA, UNSPECIFIED TYPE: ICD-10-CM

## 2021-09-07 DIAGNOSIS — Z12.11 SCREENING FOR COLORECTAL CANCER: ICD-10-CM

## 2021-09-07 LAB — HBA1C MFR BLD HPLC: 7 %

## 2021-09-07 PROCEDURE — 3017F COLORECTAL CA SCREEN DOC REV: CPT | Performed by: NURSE PRACTITIONER

## 2021-09-07 PROCEDURE — G9717 DOC PT DX DEP/BP F/U NT REQ: HCPCS | Performed by: NURSE PRACTITIONER

## 2021-09-07 PROCEDURE — 99213 OFFICE O/P EST LOW 20 MIN: CPT | Performed by: NURSE PRACTITIONER

## 2021-09-07 PROCEDURE — G8427 DOCREV CUR MEDS BY ELIG CLIN: HCPCS | Performed by: NURSE PRACTITIONER

## 2021-09-07 PROCEDURE — 3051F HG A1C>EQUAL 7.0%<8.0%: CPT | Performed by: NURSE PRACTITIONER

## 2021-09-07 PROCEDURE — G8752 SYS BP LESS 140: HCPCS | Performed by: NURSE PRACTITIONER

## 2021-09-07 PROCEDURE — 83036 HEMOGLOBIN GLYCOSYLATED A1C: CPT | Performed by: NURSE PRACTITIONER

## 2021-09-07 PROCEDURE — 2022F DILAT RTA XM EVC RTNOPTHY: CPT | Performed by: NURSE PRACTITIONER

## 2021-09-07 PROCEDURE — G8754 DIAS BP LESS 90: HCPCS | Performed by: NURSE PRACTITIONER

## 2021-09-07 PROCEDURE — G8417 CALC BMI ABV UP PARAM F/U: HCPCS | Performed by: NURSE PRACTITIONER

## 2021-09-07 RX ORDER — ACETAMINOPHEN, DIPHENHYDRAMINE HCL, PHENYLEPHRINE HCL 325; 25; 5 MG/1; MG/1; MG/1
10 TABLET ORAL
Qty: 90 TABLET | Refills: 1 | Status: SHIPPED | OUTPATIENT
Start: 2021-09-07 | End: 2022-03-07

## 2021-09-07 NOTE — PROGRESS NOTES
Dona Wiseman is a 61 y.o. female (: 1955) presenting to address:    Chief Complaint   Patient presents with    Diabetes    Back Pain     comes and goes .  Sleep Problem     wants something for sleep . Vitals:    21 1335   BP: 128/79   Pulse: (!) 103   Resp: 16   Temp: 98 °F (36.7 °C)   TempSrc: Temporal   SpO2: 97%   Weight: 208 lb (94.3 kg)   Height: 5' 5\" (1.651 m)   PainSc:   0 - No pain       Hearing/Vision:   No exam data present    Learning Assessment:     Learning Assessment 3/10/2014   PRIMARY LEARNER Patient   PRIMARY LANGUAGE ENGLISH   LEARNER PREFERENCE PRIMARY OTHER (COMMENT)   ANSWERED BY patient   RELATIONSHIP SELF     Depression Screening:     3 most recent PHQ Screens 2021   PHQ Not Done -   Little interest or pleasure in doing things Not at all   Feeling down, depressed, irritable, or hopeless Not at all   Total Score PHQ 2 0     Fall Risk Assessment:     Fall Risk Assessment, last 12 mths 2021   Able to walk? Yes   Fall in past 12 months? 0   Do you feel unsteady? 0   Are you worried about falling 0     Abuse Screening:     Abuse Screening Questionnaire 2021   Do you ever feel afraid of your partner? N   Are you in a relationship with someone who physically or mentally threatens you? N   Is it safe for you to go home? Y     Coordination of Care Questionaire:   1. Have you been to the ER, urgent care clinic since your last visit? Hospitalized since your last visit? NO    2. Have you seen or consulted any other health care providers outside of the [address] [place] [address] since your last visit? Include any pap smears or colon screening. NO    Advanced Directive:   1. Do you have an Advanced Directive? NO    2. Would you like information on Advanced Directives?  NO

## 2021-09-07 NOTE — PROGRESS NOTES
HPI  Manfred Escobedo is a 61y.o. year old female who presents today for follow up for diabetes. Working on diet, exercise, weight loss. Due urine microalbumin in November. Last diabetic eye exam - has appointment in October. Checks feet regularly at home    Also having some difficulty with sleeping. Has trouble falling asleep at night. Has the TV on when she falls asleep, does not have a bedtime routine. Not sure what she can take. Has occasional back pain but it is not very bothersome. Past Medical History:   Diagnosis Date    Cataract     CVA, old, cognitive deficits     Depression 1/13/2015    Diabetes ([address]     Diabetic neuropathy ([address] 9/1/2013    Diabetic retinopathy ([address] 3/2/2012    DM (diabetes mellitus) ([address] 3/2/2012    GERD (gastroesophageal reflux disease) 4/21/2011    Glaucoma     Heel spur 11/24/2014    HTN (hypertension) 4/21/2011    Hyperlipidemia 3/2/2012    Insomnia 4/21/2011    Kidney stone     Obesity (BMI 30-39. 9) 7/16/2018    Retained ureteral stent     Schizophrenia ([address] 4/21/2011    Sleep apnea, obstructive 9/1/2013    Stroke ([address] 08/2018    pt states her Neurologist diagnosed her with stroke in August    Urinary frequency        Past Surgical History:   Procedure Laterality Date    HX OPEN CHOLECYSTECTOMY      HX UROLOGICAL Left 09/2017    Cystoscopy, Left retrograde pyelography, Left ureteroscopic stone extraction with laser lithotripsy and basket stone extraction,Left 6 x 24 cm double-J ureteral stent placement, Interpretation of urography.   [address] Avani Pierson, INCISIONAL  1970    left benign       Social History     Tobacco Use    Smoking status: Current Every Day Smoker     Packs/day: 0.50     Years: 21.00     Pack years: 10.50     Types: Cigarettes    Smokeless tobacco: Former User   Substance Use Topics    Alcohol use: No    Drug use: No         Current Outpatient Medications:     amLODIPine (NORVASC) 10 mg tablet, TAKE 1 TABLET BY MOUTH DAILY, Disp: 90 Tablet, Rfl: 1    insulin glargine (Lantus Solostar U-100 Insulin) 100 unit/mL (3 mL) inpn, ADMINISTER 55 UNITS UNDER THE SKIN EVERY NIGHT AT BEDTIME, Disp: 15 mL, Rfl: 3    amitriptyline (ELAVIL) 100 mg tablet, TAKE 1 TABLET BY MOUTH EVERY NIGHT. GENERIC FOR ELAVIL., Disp: 90 Tablet, Rfl: 1    pantoprazole (PROTONIX) 40 mg tablet, TAKE 1 TABLET BY MOUTH EVERY DAY, Disp: 90 Tablet, Rfl: 1    meloxicam (MOBIC) 15 mg tablet, TAKE 1 TABLET BY MOUTH EVERY DAY, Disp: 90 Tablet, Rfl: 1    QUEtiapine (SEROquel) 50 mg tablet, Take 1 Tab by mouth every evening. TAKE 1 TABLET BY MOUTH EVERY EVENING AS NEEDED, Disp: 90 Tab, Rfl: 1    glipiZIDE SR (GLUCOTROL XL) 10 mg CR tablet, Take 1 Tab by mouth two (2) times a day., Disp: 180 Tab, Rfl: 1    Insulin Needles, Disposable, (Rosalva Pen Needle) 32 gauge x 5/32\" ndle, AS DIRECTED ONCE A DAY, Disp: 100 Pen Needle, Rfl: 1    bimatoprost (Lumigan) 0.01 % ophthalmic drops, Administer 1 Drop to both eyes nightly., Disp: , Rfl:     mineral oil/petrolatum,white (OVERNIGHT LUBRICATING EYE OP), Apply  to eye nightly., Disp: , Rfl:     simvastatin (ZOCOR) 10 mg tablet, Take 1 Tab by mouth nightly., Disp: 90 Tab, Rfl: 1    multivitamin (ONE A DAY) tablet, Take 1 Tab by mouth daily. , Disp: , Rfl:     aspirin delayed-release 81 mg tablet, Take  by mouth daily. , Disp: , Rfl:     ALCOHOL PREP PADS padm, USE TWICE DAILY AS DIRECTED, Disp: 200 Pad, Rfl: 1    Ferrous Sulfate (SLOW FE) 47.5 mg iron TbER tablet, Take 1 Tab by mouth daily. , Disp: 90 Tab, Rfl: 1    Blood-Glucose Meter (CONTOUR METER) monitoring kit, DX E11.9, Disp: 1 Kit, Rfl: 0    cholecalciferol, vitamin d3, (VITAMIN D) 1,000 unit tablet, Take  by mouth daily. , Disp: , Rfl:     simvastatin (ZOCOR) 10 mg tablet, Take 1 Tablet by mouth nightly.  Indications: primary prevention of heart attack (Patient not taking: Reported on 9/7/2021), Disp: 90 Tablet, Rfl: 5    SITagliptin (Januvia) 100 mg tablet, Take 1 Tab by mouth daily. Indications: type 2 diabetes mellitus (Patient not taking: Reported on 9/7/2021), Disp: 30 Tab, Rfl: 3     Allergies   Allergen Reactions    Amaryl [Glimepiride] Nausea Only    Penicillins Nausea and Vomiting       Review of Systems   Constitutional: Negative for chills, fever and malaise/fatigue. Eyes: Negative for blurred vision and double vision. Respiratory: Negative for cough and shortness of breath. Cardiovascular: Negative for chest pain, palpitations and leg swelling. Genitourinary: Negative for frequency. Neurological: Negative for dizziness and headaches. Endo/Heme/Allergies: Negative for polydipsia. Psychiatric/Behavioral: The patient has insomnia (difficulty falling asleep). PE  /79   Pulse (!) 103   Temp 98 °F (36.7 °C) (Temporal)   Resp 16   Ht 5' 5\" (1.651 m)   Wt 208 lb (94.3 kg)   SpO2 97%   BMI 34.61 kg/m²     Physical Exam  Vitals reviewed. Constitutional:       General: She is not in acute distress. Appearance: Normal appearance. HENT:      Head: Normocephalic and atraumatic. Cardiovascular:      Rate and Rhythm: Normal rate and regular rhythm. Heart sounds: S1 normal and S2 normal. No murmur heard. No friction rub. No gallop. No S3 or S4 sounds. Pulmonary:      Effort: Pulmonary effort is normal.      Breath sounds: Normal breath sounds. No wheezing, rhonchi or rales. Musculoskeletal:      Right lower leg: No edema. Left lower leg: No edema. Skin:     General: Skin is warm and dry. Capillary Refill: Capillary refill takes less than 2 seconds. Neurological:      Mental Status: She is alert and oriented to person, place, and time. Assessment/Plan  1. T2Dm  A1C 7 (unchanged from last OV)  Continue current medications  Continue to work on diet, exercise, weight loss  FU OV 4 months    2.  Insomnia  Suggest melatonin and/or benadryl  Discussed importance of bedtime routine  Discussed reducing screen time and blue light exposure right before bed  FU if OTC does not work    3.  Preventive care  Due colo  UTD mammo and pap

## 2021-09-16 ENCOUNTER — TELEPHONE (OUTPATIENT)
Dept: FAMILY MEDICINE CLINIC | Age: 66
End: 2021-09-16

## 2021-09-20 RX ORDER — AMITRIPTYLINE HYDROCHLORIDE 100 MG/1
TABLET, FILM COATED ORAL
Qty: 90 TABLET | Refills: 1 | Status: SHIPPED | OUTPATIENT
Start: 2021-09-20 | End: 2022-03-07

## 2021-10-11 NOTE — TELEPHONE ENCOUNTER
Requested Prescriptions     Pending Prescriptions Disp Refills    insulin glargine (Lantus Solostar U-100 Insulin) 100 unit/mL (3 mL) inpn [Pharmacy Med Name: Henry Dickson 100 UNIT/ML]       Sig: INJECT 55 UNITS SUBCUTANEOUSLY AT BEDTIME     Pt called to request a refill because she is completely out. Please advise.      Future Appointments   Date Time Provider Kapil Sky   12/7/2021  1:30 PM MINGO Gallegos BS AMB

## 2021-10-12 RX ORDER — INSULIN GLARGINE 100 [IU]/ML
INJECTION, SOLUTION SUBCUTANEOUS
Qty: 15 ML | Refills: 1 | Status: SHIPPED | OUTPATIENT
Start: 2021-10-12 | End: 2021-11-24

## 2021-10-19 DIAGNOSIS — E11.9 DIABETES MELLITUS WITHOUT COMPLICATION (HCC): ICD-10-CM

## 2021-10-19 RX ORDER — GLIPIZIDE 10 MG/1
TABLET, FILM COATED, EXTENDED RELEASE ORAL
Qty: 180 TABLET | Refills: 1 | Status: SHIPPED | OUTPATIENT
Start: 2021-10-19 | End: 2022-02-14 | Stop reason: SDUPTHER

## 2021-10-19 RX ORDER — QUETIAPINE FUMARATE 50 MG/1
TABLET, FILM COATED ORAL
Qty: 90 TABLET | Refills: 1 | Status: SHIPPED | OUTPATIENT
Start: 2021-10-19 | End: 2022-03-07

## 2021-11-24 RX ORDER — INSULIN GLARGINE 100 [IU]/ML
INJECTION, SOLUTION SUBCUTANEOUS
Qty: 15 ML | Refills: 3 | Status: SHIPPED | OUTPATIENT
Start: 2021-11-24 | End: 2022-03-07 | Stop reason: SDUPTHER

## 2021-11-24 RX ORDER — INSULIN GLARGINE 100 [IU]/ML
INJECTION, SOLUTION SUBCUTANEOUS
Qty: 15 ML | Refills: 3 | Status: SHIPPED | OUTPATIENT
Start: 2021-11-24 | End: 2021-11-24 | Stop reason: SDUPTHER

## 2021-11-24 RX ORDER — PEN NEEDLE, DIABETIC 31 GX3/16"
NEEDLE, DISPOSABLE MISCELLANEOUS
Qty: 100 PEN NEEDLE | Refills: 1 | Status: SHIPPED | OUTPATIENT
Start: 2021-11-24

## 2021-11-24 NOTE — TELEPHONE ENCOUNTER
Last refill for Insulin Needles was 03/23/2021 qty of 100 pen needles with 1 refills.   Future Appointments   Date Time Provider Kapil Sky   12/7/2021  1:30 PM Stuart Perry NP [facility] BS AMB     Last appointment was 09/07/2021

## 2021-11-24 NOTE — TELEPHONE ENCOUNTER
Patient calling to refill insulin and needles    Requested Prescriptions     Pending Prescriptions Disp Refills    Insulin Needles, Disposable, (Rosalva Pen Needle) 32 gauge x 5/32\" ndle 100 Pen Needle 1     Sig: AS DIRECTED ONCE A DAY    insulin glargine (Lantus Solostar U-100 Insulin) 100 unit/mL (3 mL) inpn 15 mL 3

## 2021-11-26 RX ORDER — MELOXICAM 15 MG/1
TABLET ORAL
Qty: 90 TABLET | Refills: 1 | Status: SHIPPED | OUTPATIENT
Start: 2021-11-26 | End: 2022-03-07

## 2021-12-01 ENCOUNTER — TELEPHONE (OUTPATIENT)
Dept: MAMMOGRAPHY | Age: 66
End: 2021-12-01

## 2021-12-07 ENCOUNTER — OFFICE VISIT (OUTPATIENT)
Dept: FAMILY MEDICINE CLINIC | Age: 66
End: 2021-12-07
Payer: MEDICARE

## 2021-12-07 ENCOUNTER — TELEPHONE (OUTPATIENT)
Dept: FAMILY MEDICINE CLINIC | Age: 66
End: 2021-12-07

## 2021-12-07 VITALS
BODY MASS INDEX: 34.99 KG/M2 | SYSTOLIC BLOOD PRESSURE: 108 MMHG | DIASTOLIC BLOOD PRESSURE: 74 MMHG | WEIGHT: 210 LBS | HEIGHT: 65 IN | OXYGEN SATURATION: 98 % | TEMPERATURE: 98.2 F | HEART RATE: 95 BPM | RESPIRATION RATE: 16 BRPM

## 2021-12-07 DIAGNOSIS — E66.01 SEVERE OBESITY (BMI 35.0-39.9) WITH COMORBIDITY (HCC): ICD-10-CM

## 2021-12-07 DIAGNOSIS — I10 ESSENTIAL HYPERTENSION: ICD-10-CM

## 2021-12-07 DIAGNOSIS — E11.42 TYPE 2 DIABETES MELLITUS WITH DIABETIC POLYNEUROPATHY, WITH LONG-TERM CURRENT USE OF INSULIN (HCC): Primary | ICD-10-CM

## 2021-12-07 DIAGNOSIS — F20.9 SCHIZOPHRENIA, UNSPECIFIED TYPE (HCC): ICD-10-CM

## 2021-12-07 DIAGNOSIS — Z79.4 TYPE 2 DIABETES MELLITUS WITH DIABETIC POLYNEUROPATHY, WITH LONG-TERM CURRENT USE OF INSULIN (HCC): Primary | ICD-10-CM

## 2021-12-07 DIAGNOSIS — E78.2 MIXED HYPERLIPIDEMIA: ICD-10-CM

## 2021-12-07 LAB — HBA1C MFR BLD HPLC: 7.4 %

## 2021-12-07 PROCEDURE — G8752 SYS BP LESS 140: HCPCS | Performed by: NURSE PRACTITIONER

## 2021-12-07 PROCEDURE — G9717 DOC PT DX DEP/BP F/U NT REQ: HCPCS | Performed by: NURSE PRACTITIONER

## 2021-12-07 PROCEDURE — 3017F COLORECTAL CA SCREEN DOC REV: CPT | Performed by: NURSE PRACTITIONER

## 2021-12-07 PROCEDURE — G8417 CALC BMI ABV UP PARAM F/U: HCPCS | Performed by: NURSE PRACTITIONER

## 2021-12-07 PROCEDURE — G8427 DOCREV CUR MEDS BY ELIG CLIN: HCPCS | Performed by: NURSE PRACTITIONER

## 2021-12-07 PROCEDURE — 99213 OFFICE O/P EST LOW 20 MIN: CPT | Performed by: NURSE PRACTITIONER

## 2021-12-07 PROCEDURE — 3051F HG A1C>EQUAL 7.0%<8.0%: CPT | Performed by: NURSE PRACTITIONER

## 2021-12-07 PROCEDURE — 2022F DILAT RTA XM EVC RTNOPTHY: CPT | Performed by: NURSE PRACTITIONER

## 2021-12-07 PROCEDURE — G8754 DIAS BP LESS 90: HCPCS | Performed by: NURSE PRACTITIONER

## 2021-12-07 PROCEDURE — 83036 HEMOGLOBIN GLYCOSYLATED A1C: CPT | Performed by: NURSE PRACTITIONER

## 2021-12-07 RX ORDER — LIRAGLUTIDE 6 MG/ML
INJECTION SUBCUTANEOUS
Qty: 6 ML | Refills: 3 | Status: SHIPPED | OUTPATIENT
Start: 2021-12-07 | End: 2022-03-07

## 2021-12-07 RX ORDER — TRAZODONE HYDROCHLORIDE 50 MG/1
TABLET ORAL
Qty: 60 TABLET | Refills: 1 | Status: SHIPPED | OUTPATIENT
Start: 2021-12-07 | End: 2022-03-07 | Stop reason: SDUPTHER

## 2021-12-07 NOTE — PROGRESS NOTES
Julisa Odom is a 61 y.o. female (: 1955) presenting to address:    Chief Complaint   Patient presents with    Medication Management     follow up       Vitals:    21 1321   BP: 108/74   Pulse: 95   Resp: 16   Temp: 98.2 °F (36.8 °C)   TempSrc: Temporal   SpO2: 98%   Weight: 210 lb (95.3 kg)   Height: 5' 5\" (1.651 m)   PainSc:   0 - No pain       Hearing/Vision:   No exam data present    Learning Assessment:     Learning Assessment 3/10/2014   PRIMARY LEARNER Patient   PRIMARY LANGUAGE ENGLISH   LEARNER PREFERENCE PRIMARY OTHER (COMMENT)   ANSWERED BY patient   RELATIONSHIP SELF     Depression Screening:     3 most recent PHQ Screens 2021   PHQ Not Done -   Little interest or pleasure in doing things Not at all   Feeling down, depressed, irritable, or hopeless More than half the days   Total Score PHQ 2 2     Fall Risk Assessment:     Fall Risk Assessment, last 12 mths 2021   Able to walk? Yes   Fall in past 12 months? 0   Do you feel unsteady? 0   Are you worried about falling 0     Abuse Screening:     Abuse Screening Questionnaire 2021   Do you ever feel afraid of your partner? N   Are you in a relationship with someone who physically or mentally threatens you? N   Is it safe for you to go home?  Y     ADL Assessment:     ADL Assessment 2019   Feeding yourself No Help Needed   Getting from bed to chair No Help Needed   Getting dressed No Help Needed   Bathing or showering No Help Needed   Walk across the room (includes cane/walker) No Help Needed   Using the telphone No Help Needed   Taking your medications No Help Needed   Preparing meals No Help Needed   Managing money (expenses/bills) No Help Needed   Moderately strenuous housework (laundry) No Help Needed   Shopping for personal items (toiletries/medicines) No Help Needed   Shopping for groceries No Help Needed   Driving No Help Needed   Climbing a flight of stairs No Help Needed   Getting to places beyond walking distances No Help Needed        Coordination of Care Questionaire:   1. \"Have you been to the ER, urgent care clinic since your last visit? Hospitalized since your last visit? \" No    2. \"Have you seen or consulted any other health care providers outside of the [address] [place] [address] since your last visit? \" No     3. For patients aged 39-70: Has the patient had a colonoscopy? No     If the patient is female:    4. For patients aged 41-77: Has the patient had a mammogram within the past 2 years? Yes, but HM not satisfied. Rooming MA/LPN to request most recent results    5. For patients aged 21-65: Has the patient had a pap smear? No    Advanced Directive:   1. Do you have an Advanced Directive? NO    2. Would you like information on Advanced Directives? NO  Patient DECLINED flu vaccine.

## 2021-12-07 NOTE — TELEPHONE ENCOUNTER
Jeffery noe Hato Candal called on behalf of the pt. She called in regards to a fax that was sent over las week in regards to statin therapy recommendation. Please contact if fax was not received. Please advise

## 2021-12-07 NOTE — TELEPHONE ENCOUNTER
Esther Cespedes from [facility] called on behalf of the pt. She called in regards to a fax that was sent over las week in regards to statin therapy recommendation. Please contact if fax was not received.  Please advise

## 2021-12-08 NOTE — PROGRESS NOTES
HPI  Heather Mario is a 61y.o. year old female who presents today for diabetes follow up. She had her diabetic eye exam earlier in the year which was normal. She checks her feet regularly at home. States that she had been doing OK with her diet but has been slipping more recently. She is not physically active. Past Medical History:   Diagnosis Date    Cataract     CVA, old, cognitive deficits     Depression 1/13/2015    Diabetes ([facility]     Diabetic neuropathy ([address] 9/1/2013    Diabetic retinopathy ([address] 3/2/2012    DM (diabetes mellitus) ([address] 3/2/2012    GERD (gastroesophageal reflux disease) 4/21/2011    Glaucoma     Heel spur 11/24/2014    HTN (hypertension) 4/21/2011    Hyperlipidemia 3/2/2012    Insomnia 4/21/2011    Kidney stone     Obesity (BMI 30-39. 9) 7/16/2018    Retained ureteral stent     Schizophrenia ([facility] 4/21/2011    Sleep apnea, obstructive 9/1/2013    Stroke ([facility] 08/2018    pt states her Neurologist diagnosed her with stroke in August    Urinary frequency        Past Surgical History:   Procedure Laterality Date    HX OPEN CHOLECYSTECTOMY      HX UROLOGICAL Left 09/2017    Cystoscopy, Left retrograde pyelography, Left ureteroscopic stone extraction with laser lithotripsy and basket stone extraction,Left 6 x 24 cm double-J ureteral stent placement, Interpretation of urography. [address] - [address] Libertad Darling, INCISIONAL  1970    left benign       Social History     Tobacco Use    Smoking status: Current Every Day Smoker     Packs/day: 0.50     Years: 21.00     Pack years: 10.50     Types: Cigarettes    Smokeless tobacco: Former User   Substance Use Topics    Alcohol use: No    Drug use: No         Current Outpatient Medications:     liraglutide (Victoza 2-Mohan) 0.6 mg/0.1 mL (18 mg/3 mL) pnij, Inject 0.6mg subcutaneously for one week, then increase to 1.2mg daily. , Disp: 6 mL, Rfl: 3    traZODone (DESYREL) 50 mg tablet, Take 1-2 tabs at bedtime as needed for sleep., Disp: 60 Tablet, Rfl: 1    meloxicam (MOBIC) 15 mg tablet, TAKE 1 TABLET BY MOUTH EVERY DAY, Disp: 90 Tablet, Rfl: 1    Insulin Needles, Disposable, (Rosalva Pen Needle) 32 gauge x 5/32\" ndle, AS DIRECTED ONCE A DAY, Disp: 100 Pen Needle, Rfl: 1    insulin glargine (Lantus Solostar U-100 Insulin) 100 unit/mL (3 mL) inpn, INJECT 55 UNITS SUBCUTANEOUSLY AT BEDTIME, Disp: 15 mL, Rfl: 3    QUEtiapine (SEROquel) 50 mg tablet, TAKE 1 TABLET BY MOUTH EVERY EVENING AS NEEDED, Disp: 90 Tablet, Rfl: 1    glipiZIDE SR (GLUCOTROL XL) 10 mg CR tablet, TAKE 1 TABLET BY MOUTH TWICE DAILY, Disp: 180 Tablet, Rfl: 1    amitriptyline (ELAVIL) 100 mg tablet, TAKE 1 TABLET BY MOUTH EVERY NIGHT. GENERIC FOR ELAVIL., Disp: 90 Tablet, Rfl: 1    amLODIPine (NORVASC) 10 mg tablet, TAKE 1 TABLET BY MOUTH DAILY, Disp: 90 Tablet, Rfl: 1    pantoprazole (PROTONIX) 40 mg tablet, TAKE 1 TABLET BY MOUTH EVERY DAY, Disp: 90 Tablet, Rfl: 1    bimatoprost (Lumigan) 0.01 % ophthalmic drops, Administer 1 Drop to both eyes nightly., Disp: , Rfl:     mineral oil/petrolatum,white (OVERNIGHT LUBRICATING EYE OP), Apply  to eye nightly., Disp: , Rfl:     simvastatin (ZOCOR) 10 mg tablet, Take 1 Tab by mouth nightly., Disp: 90 Tab, Rfl: 1    multivitamin (ONE A DAY) tablet, Take 1 Tab by mouth daily. , Disp: , Rfl:     aspirin delayed-release 81 mg tablet, Take  by mouth daily. , Disp: , Rfl:     ALCOHOL PREP PADS padm, USE TWICE DAILY AS DIRECTED, Disp: 200 Pad, Rfl: 1    Ferrous Sulfate (SLOW FE) 47.5 mg iron TbER tablet, Take 1 Tab by mouth daily. , Disp: 90 Tab, Rfl: 1    Blood-Glucose Meter (CONTOUR METER) monitoring kit, DX E11.9, Disp: 1 Kit, Rfl: 0    cholecalciferol, vitamin d3, (VITAMIN D) 1,000 unit tablet, Take  by mouth daily. , Disp: , Rfl:     melatonin 10 mg tab, Take 10 mg by mouth nightly as needed (insomnia).  (Patient not taking: Reported on 12/7/2021), Disp: 90 Tablet, Rfl: 1    SITagliptin (Januvia) 100 mg tablet, Take 1 Tab by mouth daily. Indications: type 2 diabetes mellitus (Patient not taking: Reported on 9/7/2021), Disp: 30 Tab, Rfl: 3     Allergies   Allergen Reactions    Amaryl [Glimepiride] Nausea Only    Penicillins Nausea and Vomiting       Review of Systems   Constitutional: Negative for chills, fever and malaise/fatigue. Eyes: Negative for blurred vision and double vision. Respiratory: Negative for cough and shortness of breath. Cardiovascular: Negative for chest pain, palpitations and leg swelling. Neurological: Negative for dizziness and headaches. PE  /74 (BP 1 Location: Left upper arm, BP Patient Position: Sitting, BP Cuff Size: Large adult)   Pulse 95   Temp 98.2 °F (36.8 °C) (Temporal)   Resp 16   Ht 5' 5\" (1.651 m)   Wt 210 lb (95.3 kg)   SpO2 98%   BMI 34.95 kg/m²     Physical Exam  Vitals reviewed. Constitutional:       General: She is not in acute distress. Appearance: Normal appearance. HENT:      Head: Normocephalic and atraumatic. Cardiovascular:      Rate and Rhythm: Normal rate and regular rhythm. Heart sounds: S1 normal and S2 normal. No murmur heard. No friction rub. No gallop. No S3 or S4 sounds. Pulmonary:      Effort: Pulmonary effort is normal.      Breath sounds: Normal breath sounds. No wheezing, rhonchi or rales. Musculoskeletal:      Right lower leg: No edema. Left lower leg: No edema. Skin:     General: Skin is warm and dry. Capillary Refill: Capillary refill takes less than 2 seconds. Neurological:      Mental Status: She is alert and oriented to person, place, and time. Assessment/Plan  1. T2DM  A1C 7.4 (up from 7)  Add victoza 1.2 every day  rechec 3 months    2. HTN  BP well controlled    3. HLD  Due lipid check in March    4.  Preventive care  UTD mammogram  Due colo, pap  Due MWV, fasting labs in March

## 2021-12-13 ENCOUNTER — TELEPHONE (OUTPATIENT)
Dept: FAMILY MEDICINE CLINIC | Age: 66
End: 2021-12-13

## 2021-12-13 NOTE — TELEPHONE ENCOUNTER
Pt called to report that she is not going to take the victozia and wants that off her med list. She wants to go back to what she was taking before which is the lantus solostar. Just an FYI.

## 2021-12-15 DIAGNOSIS — I10 ESSENTIAL HYPERTENSION: ICD-10-CM

## 2021-12-15 RX ORDER — AMLODIPINE BESYLATE 10 MG/1
TABLET ORAL
Qty: 90 TABLET | Refills: 1 | Status: SHIPPED | OUTPATIENT
Start: 2021-12-15 | End: 2022-07-01 | Stop reason: SDUPTHER

## 2021-12-15 RX ORDER — PANTOPRAZOLE SODIUM 40 MG/1
TABLET, DELAYED RELEASE ORAL
Qty: 90 TABLET | Refills: 1 | Status: SHIPPED | OUTPATIENT
Start: 2021-12-15 | End: 2022-07-28 | Stop reason: SDUPTHER

## 2022-02-14 DIAGNOSIS — E11.9 DIABETES MELLITUS WITHOUT COMPLICATION (HCC): ICD-10-CM

## 2022-02-14 RX ORDER — GLIPIZIDE 10 MG/1
10 TABLET, FILM COATED, EXTENDED RELEASE ORAL 2 TIMES DAILY
Qty: 180 TABLET | Refills: 0 | Status: SHIPPED | OUTPATIENT
Start: 2022-02-14 | End: 2022-05-14

## 2022-02-14 NOTE — TELEPHONE ENCOUNTER
Patient requesting medication refill. She is a former yin patient. This medication was last filled 10/19/21  Requested Prescriptions     Pending Prescriptions Disp Refills    glipiZIDE SR (GLUCOTROL XL) 10 mg CR tablet 180 Tablet 1     Sig: Take 1 Tablet by mouth two (2) times a day.      Future Appointments   Date Time Provider Kapil Sky   2/28/2022  2:50 PM [facility]   3/7/2022 11:15 AM MD VINICIUS Kwon     Last seen 12/07/21

## 2022-02-28 ENCOUNTER — HOSPITAL ENCOUNTER (OUTPATIENT)
Dept: LAB | Age: 67
Discharge: HOME | End: 2022-02-28
Payer: MEDICARE

## 2022-02-28 ENCOUNTER — TELEPHONE (OUTPATIENT)
Dept: FAMILY MEDICINE CLINIC | Age: 67
End: 2022-02-28

## 2022-02-28 ENCOUNTER — APPOINTMENT (OUTPATIENT)
Dept: FAMILY MEDICINE CLINIC | Age: 67
End: 2022-02-28

## 2022-02-28 DIAGNOSIS — E11.9 DIABETES MELLITUS WITHOUT COMPLICATION (HCC): Primary | ICD-10-CM

## 2022-02-28 DIAGNOSIS — E55.9 HYPOVITAMINOSIS D: ICD-10-CM

## 2022-02-28 DIAGNOSIS — E78.2 MIXED HYPERLIPIDEMIA: ICD-10-CM

## 2022-02-28 DIAGNOSIS — I10 ESSENTIAL HYPERTENSION: ICD-10-CM

## 2022-02-28 DIAGNOSIS — Z79.4 TYPE 2 DIABETES MELLITUS WITH DIABETIC POLYNEUROPATHY, WITH LONG-TERM CURRENT USE OF INSULIN (HCC): ICD-10-CM

## 2022-02-28 DIAGNOSIS — E11.42 TYPE 2 DIABETES MELLITUS WITH DIABETIC POLYNEUROPATHY, WITH LONG-TERM CURRENT USE OF INSULIN (HCC): ICD-10-CM

## 2022-02-28 DIAGNOSIS — E11.9 DIABETES MELLITUS WITHOUT COMPLICATION (HCC): ICD-10-CM

## 2022-02-28 LAB
25(OH)D3 SERPL-MCNC: 49.2 NG/ML (ref 30–100)
ALBUMIN SERPL-MCNC: 3.5 G/DL (ref 3.4–5)
ALBUMIN/GLOB SERPL: 1.1 {RATIO} (ref 0.8–1.7)
ALP SERPL-CCNC: 113 U/L (ref 45–117)
ALT SERPL-CCNC: 28 U/L (ref 13–56)
ANION GAP SERPL CALC-SCNC: 5 MMOL/L (ref 3–18)
AST SERPL-CCNC: 9 U/L (ref 10–38)
BASOPHILS # BLD: 0.1 K/UL (ref 0–0.1)
BASOPHILS NFR BLD: 1 % (ref 0–2)
BILIRUB SERPL-MCNC: 0.2 MG/DL (ref 0.2–1)
BUN SERPL-MCNC: 17 MG/DL (ref 7–18)
BUN/CREAT SERPL: 17 (ref 12–20)
CALCIUM SERPL-MCNC: 9.1 MG/DL (ref 8.5–10.1)
CHLORIDE SERPL-SCNC: 105 MMOL/L (ref 100–111)
CHOLEST SERPL-MCNC: 145 MG/DL
CO2 SERPL-SCNC: 29 MMOL/L (ref 21–32)
CREAT SERPL-MCNC: 1.02 MG/DL (ref 0.6–1.3)
CREAT UR-MCNC: 279 MG/DL (ref 30–125)
DIFFERENTIAL METHOD BLD: ABNORMAL
EOSINOPHIL # BLD: 0.3 K/UL (ref 0–0.4)
EOSINOPHIL NFR BLD: 3 % (ref 0–5)
ERYTHROCYTE [DISTWIDTH] IN BLOOD BY AUTOMATED COUNT: 12.4 % (ref 11.6–14.5)
GLOBULIN SER CALC-MCNC: 3.3 G/DL (ref 2–4)
GLUCOSE SERPL-MCNC: 139 MG/DL (ref 74–99)
HCT VFR BLD AUTO: 41.7 % (ref 35–45)
HDLC SERPL-MCNC: 40 MG/DL (ref 40–60)
HDLC SERPL: 3.6 {RATIO} (ref 0–5)
HGB BLD-MCNC: 12.8 G/DL (ref 12–16)
IMM GRANULOCYTES # BLD AUTO: 0 K/UL (ref 0–0.04)
IMM GRANULOCYTES NFR BLD AUTO: 0 % (ref 0–0.5)
LDLC SERPL CALC-MCNC: 62.8 MG/DL (ref 0–100)
LIPID PROFILE,FLP: ABNORMAL
LYMPHOCYTES # BLD: 3.7 K/UL (ref 0.9–3.6)
LYMPHOCYTES NFR BLD: 35 % (ref 21–52)
MCH RBC QN AUTO: 28.9 PG (ref 24–34)
MCHC RBC AUTO-ENTMCNC: 30.7 G/DL (ref 31–37)
MCV RBC AUTO: 94.1 FL (ref 78–100)
MICROALBUMIN UR-MCNC: 3.27 MG/DL (ref 0–3)
MICROALBUMIN/CREAT UR-RTO: 12 MG/G (ref 0–30)
MONOCYTES # BLD: 0.7 K/UL (ref 0.05–1.2)
MONOCYTES NFR BLD: 7 % (ref 3–10)
NEUTS SEG # BLD: 5.9 K/UL (ref 1.8–8)
NEUTS SEG NFR BLD: 55 % (ref 40–73)
NRBC # BLD: 0 K/UL (ref 0–0.01)
NRBC BLD-RTO: 0 PER 100 WBC
PLATELET # BLD AUTO: 401 K/UL (ref 135–420)
PMV BLD AUTO: 9.4 FL (ref 9.2–11.8)
POTASSIUM SERPL-SCNC: 4.5 MMOL/L (ref 3.5–5.5)
PROT SERPL-MCNC: 6.8 G/DL (ref 6.4–8.2)
RBC # BLD AUTO: 4.43 M/UL (ref 4.2–5.3)
SODIUM SERPL-SCNC: 139 MMOL/L (ref 136–145)
T4 FREE SERPL-MCNC: 0.8 NG/DL (ref 0.7–1.5)
TRIGL SERPL-MCNC: 211 MG/DL (ref ?–150)
VLDLC SERPL CALC-MCNC: 42.2 MG/DL
WBC # BLD AUTO: 10.8 K/UL (ref 4.6–13.2)

## 2022-02-28 PROCEDURE — 80053 COMPREHEN METABOLIC PANEL: CPT

## 2022-02-28 PROCEDURE — 85025 COMPLETE CBC W/AUTO DIFF WBC: CPT

## 2022-02-28 PROCEDURE — 36415 COLL VENOUS BLD VENIPUNCTURE: CPT

## 2022-02-28 PROCEDURE — 84439 ASSAY OF FREE THYROXINE: CPT

## 2022-02-28 PROCEDURE — 82043 UR ALBUMIN QUANTITATIVE: CPT

## 2022-02-28 PROCEDURE — 80061 LIPID PANEL: CPT

## 2022-02-28 PROCEDURE — 82306 VITAMIN D 25 HYDROXY: CPT

## 2022-02-28 NOTE — TELEPHONE ENCOUNTER
Pt is here for labs, please order; pt has appt on:   Future Appointments   Date Time Provider Kapil Sky   2/28/2022  2:50 PM [facility] BS AMB   3/7/2022 11:15 AM Helene Osgood, MD [facility] BS AMB

## 2022-03-07 ENCOUNTER — OFFICE VISIT (OUTPATIENT)
Dept: FAMILY MEDICINE CLINIC | Age: 67
End: 2022-03-07
Payer: MEDICARE

## 2022-03-07 VITALS
BODY MASS INDEX: 34.66 KG/M2 | HEIGHT: 65 IN | WEIGHT: 208 LBS | DIASTOLIC BLOOD PRESSURE: 68 MMHG | TEMPERATURE: 97.2 F | OXYGEN SATURATION: 99 % | SYSTOLIC BLOOD PRESSURE: 102 MMHG | RESPIRATION RATE: 14 BRPM | HEART RATE: 82 BPM

## 2022-03-07 DIAGNOSIS — Z72.0 TOBACCO ABUSE: ICD-10-CM

## 2022-03-07 DIAGNOSIS — E55.9 HYPOVITAMINOSIS D: ICD-10-CM

## 2022-03-07 DIAGNOSIS — Z79.4 TYPE 2 DIABETES MELLITUS WITH DIABETIC POLYNEUROPATHY, WITH LONG-TERM CURRENT USE OF INSULIN (HCC): ICD-10-CM

## 2022-03-07 DIAGNOSIS — G47.00 INSOMNIA, UNSPECIFIED TYPE: ICD-10-CM

## 2022-03-07 DIAGNOSIS — I10 ESSENTIAL HYPERTENSION: Primary | ICD-10-CM

## 2022-03-07 DIAGNOSIS — Z12.11 COLON CANCER SCREENING: ICD-10-CM

## 2022-03-07 DIAGNOSIS — E78.2 MIXED HYPERLIPIDEMIA: ICD-10-CM

## 2022-03-07 DIAGNOSIS — F20.9 SCHIZOPHRENIA, UNSPECIFIED TYPE (HCC): ICD-10-CM

## 2022-03-07 DIAGNOSIS — E11.42 TYPE 2 DIABETES MELLITUS WITH DIABETIC POLYNEUROPATHY, WITH LONG-TERM CURRENT USE OF INSULIN (HCC): ICD-10-CM

## 2022-03-07 LAB — HBA1C MFR BLD HPLC: 7.1 %

## 2022-03-07 PROCEDURE — 83036 HEMOGLOBIN GLYCOSYLATED A1C: CPT | Performed by: LEGAL MEDICINE

## 2022-03-07 PROCEDURE — G8754 DIAS BP LESS 90: HCPCS | Performed by: LEGAL MEDICINE

## 2022-03-07 PROCEDURE — G8752 SYS BP LESS 140: HCPCS | Performed by: LEGAL MEDICINE

## 2022-03-07 PROCEDURE — 2022F DILAT RTA XM EVC RTNOPTHY: CPT | Performed by: LEGAL MEDICINE

## 2022-03-07 PROCEDURE — G9717 DOC PT DX DEP/BP F/U NT REQ: HCPCS | Performed by: LEGAL MEDICINE

## 2022-03-07 PROCEDURE — 1090F PRES/ABSN URINE INCON ASSESS: CPT | Performed by: LEGAL MEDICINE

## 2022-03-07 PROCEDURE — 3051F HG A1C>EQUAL 7.0%<8.0%: CPT | Performed by: LEGAL MEDICINE

## 2022-03-07 PROCEDURE — 99214 OFFICE O/P EST MOD 30 MIN: CPT | Performed by: LEGAL MEDICINE

## 2022-03-07 PROCEDURE — 3017F COLORECTAL CA SCREEN DOC REV: CPT | Performed by: LEGAL MEDICINE

## 2022-03-07 PROCEDURE — G8417 CALC BMI ABV UP PARAM F/U: HCPCS | Performed by: LEGAL MEDICINE

## 2022-03-07 PROCEDURE — G8427 DOCREV CUR MEDS BY ELIG CLIN: HCPCS | Performed by: LEGAL MEDICINE

## 2022-03-07 PROCEDURE — G8536 NO DOC ELDER MAL SCRN: HCPCS | Performed by: LEGAL MEDICINE

## 2022-03-07 PROCEDURE — G8399 PT W/DXA RESULTS DOCUMENT: HCPCS | Performed by: LEGAL MEDICINE

## 2022-03-07 PROCEDURE — 1101F PT FALLS ASSESS-DOCD LE1/YR: CPT | Performed by: LEGAL MEDICINE

## 2022-03-07 RX ORDER — TRAZODONE HYDROCHLORIDE 50 MG/1
50 TABLET ORAL
Qty: 60 TABLET | Refills: 1 | Status: SHIPPED | OUTPATIENT
Start: 2022-03-07 | End: 2022-06-20

## 2022-03-07 RX ORDER — INSULIN GLARGINE 100 [IU]/ML
INJECTION, SOLUTION SUBCUTANEOUS
Qty: 15 ML | Refills: 3 | Status: SHIPPED | OUTPATIENT
Start: 2022-03-07 | End: 2022-08-10

## 2022-03-07 NOTE — PROGRESS NOTES
Charlotte Miller     Chief Complaint   Patient presents with    Transitions Of Care     loss of feeling in both legs      Vitals:    03/07/22 1104   BP: 102/68   Pulse: 82   Resp: 14   Temp: 97.2 °F (36.2 °C)   TempSrc: Temporal   SpO2: 99%   Weight: 208 lb (94.3 kg)   Height: 5' 5\" (1.651 m)   PainSc:   3   PainLoc: Back         HPI: Mrs. Joni Suazo is here to establish care with new PCP   She has type 2 diabetes on lantus  55 untis daily as well as glipizide controlled release 10 mg daily    Bilateral leg fatigue and tiredness for about  6 month has H/o of spinal stenosis no numbness      Declined covid19   Has not done colonoscopy screening she will consider getting a Cologuard    She is a lifelong smoker currently smoking 1/2 pack a day smoking trying to quit     Eye  exam done with pradip  due for follow up     Past Medical History:   Diagnosis Date    Cataract     CVA, old, cognitive deficits     Depression 1/13/2015    Diabetes ([address]     Diabetic neuropathy ([address] 9/1/2013    Diabetic retinopathy ([address] 3/2/2012    DM (diabetes mellitus) ([address] 3/2/2012    GERD (gastroesophageal reflux disease) 4/21/2011    Glaucoma     Heel spur 11/24/2014    HTN (hypertension) 4/21/2011    Hyperlipidemia 3/2/2012    Insomnia 4/21/2011    Kidney stone     Obesity (BMI 30-39. 9) 7/16/2018    Retained ureteral stent     Schizophrenia ([address] 4/21/2011    Sleep apnea, obstructive 9/1/2013    Stroke ([address] 08/2018    pt states her Neurologist diagnosed her with stroke in August    Urinary frequency      Past Surgical History:   Procedure Laterality Date    HX OPEN CHOLECYSTECTOMY      HX UROLOGICAL Left 09/2017    Cystoscopy, Left retrograde pyelography, Left ureteroscopic stone extraction with laser lithotripsy and basket stone extraction,Left 6 x 24 cm double-J ureteral stent placement, Interpretation of urography.   [facility] - Dr Castillo Farner, [facility]ISION[facility]  1970    left benign     Social History Tobacco Use    Smoking status: Current Every Day Smoker     Packs/day: 0.50     Years: 21.00     Pack years: 10.50     Types: Cigarettes    Smokeless tobacco: Former User   Substance Use Topics    Alcohol use: No       Family History   Family history unknown: Yes       Review of Systems   Constitutional: Negative for chills, fever, malaise/fatigue and weight loss. HENT: Negative for congestion, ear discharge, ear pain, hearing loss, nosebleeds, sinus pain and sore throat. Eyes: Negative for blurred vision, double vision and discharge. Respiratory: Negative for cough, hemoptysis, sputum production, shortness of breath and wheezing. Cardiovascular: Negative for chest pain, palpitations, claudication and leg swelling. Gastrointestinal: Negative for abdominal pain, constipation, diarrhea, nausea and vomiting. Genitourinary: Negative for dysuria, frequency and urgency. Musculoskeletal: Positive for myalgias. Negative for back pain, falls, joint pain and neck pain. Skin: Negative for itching and rash. Neurological: Negative for dizziness, tingling, sensory change, speech change, focal weakness, weakness and headaches. Psychiatric/Behavioral: Positive for depression. Negative for hallucinations, substance abuse and suicidal ideas. The patient is nervous/anxious and has insomnia. Physical Exam  Vitals and nursing note reviewed. Constitutional:       General: She is not in acute distress. Appearance: She is well-developed. She is not diaphoretic. HENT:      Head: Normocephalic and atraumatic. Mouth/Throat:      Pharynx: No oropharyngeal exudate. Eyes:      General: No scleral icterus. Right eye: No discharge. Left eye: No discharge. Conjunctiva/sclera: Conjunctivae normal.      Pupils: Pupils are equal, round, and reactive to light. Neck:      Thyroid: No thyromegaly. Cardiovascular:      Rate and Rhythm: Normal rate and regular rhythm.       Heart sounds: Normal heart sounds. Pulmonary:      Effort: Pulmonary effort is normal. No respiratory distress. Breath sounds: Normal breath sounds. No wheezing or rales. Chest:      Chest wall: No tenderness. Abdominal:      General: There is no distension. Palpations: Abdomen is soft. Tenderness: There is no abdominal tenderness. There is no rebound. Musculoskeletal:         General: No tenderness or deformity. Normal range of motion. Right lower leg: No edema. Left lower leg: No edema. Lymphadenopathy:      Cervical: No cervical adenopathy. Skin:     General: Skin is warm and dry. Coloration: Skin is not pale. Findings: No erythema or rash. Neurological:      Mental Status: She is alert and oriented to person, place, and time. Cranial Nerves: No cranial nerve deficit. Coordination: Coordination normal.   Psychiatric:         Behavior: Behavior normal.         Thought Content: Thought content normal.         Judgment: Judgment normal.          Assessment and plan     Plan of care has been discussed with the patient, he agrees to the plan and verbalized understanding. All his questions were answered  More than 50% of the time spent in this visit was counseling the patient about  illness and treatment options         1. Schizophrenia, unspecified type ([facility]  Currently stable  - traZODone (DESYREL) 50 mg tablet; Take 1 Tablet by mouth nightly for 90 days. Dispense: 60 Tablet; Refill: 1    2. Type 2 diabetes mellitus with diabetic polyneuropathy, with long-term current use of insulin ([facility])  Diabetes well controlled hemoglobin A1c 7.1  - AMB POC HEMOGLOBIN A1C  - insulin glargine (Lantus Solostar U-100 Insulin) 100 unit/mL (3 mL) inpn; INJECT 55 UNITS SUBCUTANEOUSLY AT BEDTIME  Dispense: 15 mL; Refill: 3    3. Essential hypertension  Blood pressures well controlled    4. Mixed hyperlipidemia  She is on simvastatin 10 mg daily    5.  Colon cancer screening  She agreed to get the Cologuard test  - COLOGUARD TEST (FECAL DNA COLORECTAL CANCER SCREENING)    6. Tobacco abuse  She is trying to reduce her smoking    7. Hypovitaminosis D  She is on vitamin D 1000 mg    8. Insomnia, unspecified type    - traZODone (DESYREL) 50 mg tablet; Take 1 Tablet by mouth nightly for 90 days. Dispense: 60 Tablet; Refill: 1    9. BMI 34.0-34.9,adult  Lifestyle modification has been discussed with patient in details, decrease carbohydrates, decrease or eliminate sugar intake, gradually increase physical activity as tolerated to be about 4 to 5 hours a week. Current Outpatient Medications   Medication Sig Dispense Refill    insulin glargine (Lantus Solostar U-100 Insulin) 100 unit/mL (3 mL) inpn INJECT 55 UNITS SUBCUTANEOUSLY AT BEDTIME 15 mL 3    traZODone (DESYREL) 50 mg tablet Take 1 Tablet by mouth nightly for 90 days. 60 Tablet 1    glipiZIDE SR (GLUCOTROL XL) 10 mg CR tablet Take 1 Tablet by mouth two (2) times a day. 180 Tablet 0    amLODIPine (NORVASC) 10 mg tablet TAKE 1 TABLET BY MOUTH EVERY DAY 90 Tablet 1    pantoprazole (PROTONIX) 40 mg tablet TAKE 1 TABLET BY MOUTH EVERY DAY 90 Tablet 1    Insulin Needles, Disposable, (Rosalva Pen Needle) 32 gauge x 5/32\" ndle AS DIRECTED ONCE A  Pen Needle 1    bimatoprost (Lumigan) 0.01 % ophthalmic drops Administer 1 Drop to both eyes nightly.  mineral oil/petrolatum,white (OVERNIGHT LUBRICATING EYE OP) Apply  to eye nightly.  simvastatin (ZOCOR) 10 mg tablet Take 1 Tab by mouth nightly. 90 Tab 1    multivitamin (ONE A DAY) tablet Take 1 Tab by mouth daily.  aspirin delayed-release 81 mg tablet Take  by mouth daily.  ALCOHOL PREP PADS padm USE TWICE DAILY AS DIRECTED 200 Pad 1    Ferrous Sulfate (SLOW FE) 47.5 mg iron TbER tablet Take 1 Tab by mouth daily.  90 Tab 1    Blood-Glucose Meter (CONTOUR METER) monitoring kit DX E11.9 1 Kit 0    cholecalciferol, vitamin d3, (VITAMIN D) 1,000 unit tablet Take  by mouth daily. Patient Active Problem List    Diagnosis Date Noted    CVA, old, cognitive deficits     Obesity (BMI 30-39.9) 07/16/2018    Severe obesity (BMI 35.0-39. 9) with comorbidity ([facility] 05/07/2018    Type 2 diabetes with nephropathy ([facility] 03/05/2018    Depression 01/13/2015    Heel spur 11/24/2014    Sleep apnea, obstructive 09/01/2013    Diabetic neuropathy ([facility] 09/01/2013    DM (diabetes mellitus) ([facility] 03/02/2012    Hyperlipidemia 03/02/2012    Diabetic retinopathy ([facility] 03/02/2012    Cataract     Glaucoma     HTN (hypertension) 04/21/2011    Insomnia 04/21/2011    GERD (gastroesophageal reflux disease) 04/21/2011    Schizophrenia ([facility] 04/21/2011     Results for orders placed or performed in visit on 03/07/22   AMB POC HEMOGLOBIN A1C   Result Value Ref Range    Hemoglobin A1c (POC) 7.1 %     Office Visit on 03/07/2022   Component Date Value Ref Range Status    Hemoglobin A1c (POC) 03/07/2022 7.1  % Final   Hospital Outpatient Visit on 02/28/2022   Component Date Value Ref Range Status    WBC 02/28/2022 10.8  4.6 - 13.2 K/uL Final    RBC 02/28/2022 4.43  4.20 - 5.30 M/uL Final    HGB 02/28/2022 12.8  12.0 - 16.0 g/dL Final    HCT 02/28/2022 41.7  35.0 - 45.0 % Final    MCV 02/28/2022 94.1  78.0 - 100.0 FL Final    MCH 02/28/2022 28.9  24.0 - 34.0 PG Final    MCHC 02/28/2022 30.7* 31.0 - 37.0 g/dL Final    RDW 02/28/2022 12.4  11.6 - 14.5 % Final    PLATELET 92/75/9549 739  135 - 420 K/uL Final    MPV 02/28/2022 9.4  9.2 - 11.8 FL Final    NRBC 02/28/2022 0.0  0  WBC Final    ABSOLUTE NRBC 02/28/2022 0.00  0.00 - 0.01 K/uL Final    NEUTROPHILS 02/28/2022 55  40 - 73 % Final    LYMPHOCYTES 02/28/2022 35  21 - 52 % Final    MONOCYTES 02/28/2022 7  3 - 10 % Final    EOSINOPHILS 02/28/2022 3  0 - 5 % Final    BASOPHILS 02/28/2022 1  0 - 2 % Final    IMMATURE GRANULOCYTES 02/28/2022 0  0.0 - 0.5 % Final    ABS.  NEUTROPHILS 02/28/2022 5.9  1.8 - 8.0 K/UL Final  ABS. LYMPHOCYTES 02/28/2022 3.7* 0.9 - 3.6 K/UL Final    ABS. MONOCYTES 02/28/2022 0.7  0.05 - 1.2 K/UL Final    ABS. EOSINOPHILS 02/28/2022 0.3  0.0 - 0.4 K/UL Final    ABS. BASOPHILS 02/28/2022 0.1  0.0 - 0.1 K/UL Final    ABS. IMM. GRANS. 02/28/2022 0.0  0.00 - 0.04 K/UL Final    DF 02/28/2022 AUTOMATED    Final    T4, Free 02/28/2022 0.8  0.7 - 1.5 NG/DL Final    LIPID PROFILE 02/28/2022        Final    Cholesterol, total 02/28/2022 145  <200 MG/DL Final    Triglyceride 02/28/2022 211* <150 MG/DL Final    Comment: The drugs N-acetylcysteine (NAC) and  Metamiszole have been found to cause falsely  low results in this chemical assay. Please  be sure to submit blood samples obtained  BEFORE administration of either of these  drugs to assure correct results.  HDL Cholesterol 02/28/2022 40  40 - 60 MG/DL Final    LDL, calculated 02/28/2022 62.8  0 - 100 MG/DL Final    VLDL, calculated 02/28/2022 42.2  MG/DL Final    CHOL/HDL Ratio 02/28/2022 3.6  0 - 5.0   Final    Sodium 02/28/2022 139  136 - 145 mmol/L Final    Potassium 02/28/2022 4.5  3.5 - 5.5 mmol/L Final    Chloride 02/28/2022 105  100 - 111 mmol/L Final    CO2 02/28/2022 29  21 - 32 mmol/L Final    Anion gap 02/28/2022 5  3.0 - 18 mmol/L Final    Glucose 02/28/2022 139* 74 - 99 mg/dL Final    BUN 02/28/2022 17  7.0 - 18 MG/DL Final    Creatinine 02/28/2022 1.02  0.6 - 1.3 MG/DL Final    BUN/Creatinine ratio 02/28/2022 17  12 - 20   Final    GFR est AA 02/28/2022 >60  >60 ml/min/1.73m2 Final    GFR est non-AA 02/28/2022 54* >60 ml/min/1.73m2 Final    Comment: (NOTE)  Estimated GFR is calculated using the Modification of Diet in Renal   Disease (MDRD) Study equation, reported for both  Americans   (GFRAA) and non- Americans (GFRNA), and normalized to 1.73m2   body surface area. The physician must decide which value applies to   the patient.  The MDRD study equation should only be used in   individuals age 25 or older. It has not been validated for the   following: pregnant women, patients with serious comorbid conditions,   or on certain medications, or persons with extremes of body size,   muscle mass, or nutritional status.  Calcium 02/28/2022 9.1  8.5 - 10.1 MG/DL Final    Bilirubin, total 02/28/2022 0.2  0.2 - 1.0 MG/DL Final    ALT (SGPT) 02/28/2022 28  13 - 56 U/L Final    AST (SGOT) 02/28/2022 9* 10 - 38 U/L Final    Alk. phosphatase 02/28/2022 113  45 - 117 U/L Final    Protein, total 02/28/2022 6.8  6.4 - 8.2 g/dL Final    Albumin 02/28/2022 3.5  3.4 - 5.0 g/dL Final    Globulin 02/28/2022 3.3  2.0 - 4.0 g/dL Final    A-G Ratio 02/28/2022 1.1  0.8 - 1.7   Final    Vitamin D 25-Hydroxy 02/28/2022 49.2  30 - 100 ng/mL Final    Comment: (NOTE)  Deficiency               <20 ng/mL  Insufficiency          20-30 ng/mL  Sufficient             ng/mL  Possible toxicity       >100 ng/mL    The Method used is Siemens Advia Centaur currently standardized to a   Center of Disease Control and Prevention (CDC) certified reference   [address]. Samples containing fluorescein dye can produce falsely   elevated values when tested with the ADVIA Centaur Vitamin D Assay. It is recommended that results in the toxic range, >100 ng/mL, be   retested 72 hours post fluorescein exposure.  Microalbumin,urine random 02/28/2022 3.27* 0 - 3.0 MG/DL Final    Creatinine, urine 02/28/2022 279.00* 30 - 125 mg/dL Final    Microalbumin/Creat ratio (mg/g cre* 02/28/2022 12  0 - 30 mg/g Final          Follow-up and Dispositions    · Return in about 3 months (around 6/7/2022) for for medicare wellness.

## 2022-03-07 NOTE — PROGRESS NOTES
Henry Fan is a 74 y.o. female (: 1955) presenting to address:    Chief Complaint   Patient presents with    Transitions Of Care     loss of feeling in both legs        Vitals:    22 1104   BP: 102/68   Pulse: 82   Resp: 14   Temp: 97.2 °F (36.2 °C)   TempSrc: Temporal   SpO2: 99%   Weight: 208 lb (94.3 kg)   Height: 5' 5\" (1.651 m)   PainSc:   3   PainLoc: Back       Hearing/Vision:   No exam data present    Learning Assessment:     Learning Assessment 3/10/2014   PRIMARY LEARNER Patient   PRIMARY LANGUAGE ENGLISH   LEARNER PREFERENCE PRIMARY OTHER (COMMENT)   ANSWERED BY patient   RELATIONSHIP SELF     Depression Screening:     3 most recent PHQ Screens 3/7/2022   PHQ Not Done -   Little interest or pleasure in doing things Not at all   Feeling down, depressed, irritable, or hopeless Not at all   Total Score PHQ 2 0     Fall Risk Assessment:     Fall Risk Assessment, last 12 mths 3/7/2022   Able to walk? Yes   Fall in past 12 months? 0   Do you feel unsteady? 0   Are you worried about falling 0     Abuse Screening:     Abuse Screening Questionnaire 3/7/2022   Do you ever feel afraid of your partner? N   Are you in a relationship with someone who physically or mentally threatens you? N   Is it safe for you to go home?  Y     ADL Assessment:     ADL Assessment 2019   Feeding yourself No Help Needed   Getting from bed to chair No Help Needed   Getting dressed No Help Needed   Bathing or showering No Help Needed   Walk across the room (includes cane/walker) No Help Needed   Using the telphone No Help Needed   Taking your medications No Help Needed   Preparing meals No Help Needed   Managing money (expenses/bills) No Help Needed   Moderately strenuous housework (laundry) No Help Needed   Shopping for personal items (toiletries/medicines) No Help Needed   Shopping for groceries No Help Needed   Driving No Help Needed   Climbing a flight of stairs No Help Needed   Getting to places beyond walking distances No Help Needed        Coordination of Care Questionaire:   1. \"Have you been to the ER, urgent care clinic since your last visit? Hospitalized since your last visit? \" No    2. \"Have you seen or consulted any other health care providers outside of the [address] [place] [address] since your last visit? \" No     3. For patients aged 39-70: Has the patient had a colonoscopy? NO  If the patient is female:    4. For patients aged 41-77: Has the patient had a mammogram within the past 2 years? No    5. For patients aged 21-65: Has the patient had a pap smear? No    Advanced Directive:   1. Do you have an Advanced Directive? NO    2. Would you like information on Advanced Directives?  NO

## 2022-03-18 PROBLEM — E66.9 OBESITY (BMI 30-39.9): Status: ACTIVE | Noted: 2018-07-16

## 2022-03-18 PROBLEM — E11.21 TYPE 2 DIABETES WITH NEPHROPATHY (HCC): Status: ACTIVE | Noted: 2018-03-05

## 2022-03-20 PROBLEM — E66.01 SEVERE OBESITY (BMI 35.0-39.9) WITH COMORBIDITY (HCC): Status: ACTIVE | Noted: 2018-05-07

## 2022-03-24 ENCOUNTER — TELEPHONE (OUTPATIENT)
Dept: FAMILY MEDICINE CLINIC | Age: 67
End: 2022-03-24

## 2022-03-24 DIAGNOSIS — R19.5 POSITIVE COLORECTAL CANCER SCREENING USING COLOGUARD TEST: Primary | ICD-10-CM

## 2022-03-24 NOTE — LETTER
3/30/2022 10:01 AM    MsTracey Tran Angela  [address] [address]        Ms. Sofía Chacon,    Result showed positive cologuard test, please call our office to discuss, a referral needs to be completed for a colonoscopy due to the positive results.       Sincerely,      Anel Ruelas MD

## 2022-04-01 LAB — COLOGUARD TEST, EXTERNAL: POSITIVE

## 2022-04-05 NOTE — PROGRESS NOTES
Patient has been referred for colonoscopy because of positive Cologuard testing did she schedule appointment?

## 2022-04-05 NOTE — PROGRESS NOTES
Informed pt cologuard test positive; pt needs to schedule colonoscopy; gave pt number to contact GI to scheduled for screening.

## 2022-04-25 ENCOUNTER — TELEPHONE (OUTPATIENT)
Dept: MAMMOGRAPHY | Age: 67
End: 2022-04-25

## 2022-04-25 NOTE — TELEPHONE ENCOUNTER
I called  Julia Leon, to assist her  in scheduling a Mammogram . I left a message for this patient to return my call  at [phone].     Dawn Thomas LPN   Panel Manager

## 2022-04-28 ENCOUNTER — TELEPHONE (OUTPATIENT)
Dept: FAMILY MEDICINE CLINIC | Age: 67
End: 2022-04-28

## 2022-04-28 NOTE — TELEPHONE ENCOUNTER
[address] Specialist office called in regards to pt. Office states that pt was a No Show for colon screening. Office called to inquire, says whoever answered that phone stated \"pt was not coming. Pt does not want to come nor does she want to know what is wrong with her. \"  Office has instructed pt to call [facility]. Office called just to inform us of the situation.   Office Contact number: [phone]  Future Appointments   Date Time Provider Kapil Sky   6/7/2022 11:15 AM Favian Liu MD [facility] BS AMB

## 2022-05-13 DIAGNOSIS — E11.9 DIABETES MELLITUS WITHOUT COMPLICATION (HCC): ICD-10-CM

## 2022-05-14 RX ORDER — GLIPIZIDE 10 MG/1
TABLET, FILM COATED, EXTENDED RELEASE ORAL
Qty: 180 TABLET | Refills: 0 | Status: SHIPPED | OUTPATIENT
Start: 2022-05-14 | End: 2022-07-28

## 2022-06-03 NOTE — TELEPHONE ENCOUNTER
Isaura Fong MD   Michael Suresh has been identified with a care gap for Diabetes with out a statin. She has simvastatin on her medication list. On her OV from 3/7/2022 it is listed as \"taking\". She does not have a current rx on file with her pharmacy. I have pended this for a refill. She also has an appt with you on Tuesday 6/7/2022, please make sure she is still taking/adherent ([facility] CV[facility])     Thank you,  Kinsey Varela, PharmD, [address] [facility], toll free: [phone] Option 2  ==============================================================  POPULATION HEALTH CLINICAL PHARMACY: STATIN THERAPY REVIEW  Identified statin use in persons with diabetes care gap per [employer] Records dated: 06/03/2022.     Last Visit: 03/07/2022      ASSESSMENT  DIABETES ADHERENCE  Lantus solostar LF 05/20/22 for 26 day supply  Glipizide LF 5/14/2022 for 90 day supply     Lab Results   Component Value Date/Time    Hemoglobin A1c 8.0 (H) 03/18/2021 03:07 PM    Hemoglobin A1c 9.6 (H) 08/29/2019 11:38 AM    Hemoglobin A1c 9.2 (H) 05/07/2018 01:53 PM    Hemoglobin A1c (POC) 7.1 03/07/2022 11:35 AM    Hemoglobin A1c (POC) 7.4 12/07/2021 01:56 PM    Hemoglobin A1c (POC) 7.0 09/07/2021 01:51 PM     NOTE A1c <9%    STATIN GAP IDENTIFIED    Per chart review, patient is prescribed simvastatin    Per Insurance Records   Has not been filled in last 12 months    Lab Results   Component Value Date/Time    Cholesterol, total 145 02/28/2022 01:20 PM    HDL Cholesterol 40 02/28/2022 01:20 PM    LDL, calculated 62.8 02/28/2022 01:20 PM    VLDL, calculated 42.2 02/28/2022 01:20 PM    Triglyceride 211 (H) 02/28/2022 01:20 PM    CHOL/HDL Ratio 3.6 02/28/2022 01:20 PM     ALT (SGPT)   Date Value Ref Range Status   02/28/2022 28 13 - 56 U/L Final     AST (SGOT)   Date Value Ref Range Status   02/28/2022 9 (L) 10 - 38 U/L Final     The ASCVD Risk score (Pedro Chadwick., et al., 2013) failed to calculate for the following reasons: The patient has a prior MI or stroke diagnosis     Hyperlipidemia Goal: Patient has a history of ASCVD and is therefore a candidate for high-intensity statin therapy based on updated guidelines. 2019 ADA Guidelines Age:   [de-identified]  >/= 36years old:   o History of ASCVD or 10-year ASCVD risk > 20% - high-intensity statin is recommended.      PLAN  The following are interventions that have been identified:  - Patient overdue refilling Simvastatin and active on home medication list  - Patient needs refills for Simvastatin  - Patient eligible for 90 day supply of Simvastatin  - Patient identified as having SUPD gap      Future Appointments   Date Time Provider Kapil Sky   2022 11:15 AM MD VINICIUS Wagner BS [facility], PharmD, [address] [facility], toll free: [phone] Option 2  ================================================================================  For Pharmacy Admin Tracking Only     CPA in place: No   Recommendation Provided To: Provider: 1 via Note to Provider    Intervention Detail: Adherence Monitorin and New Rx: 1, reason: Improve Adherence   Gap Closed?: Yes   Intervention Accepted By: Provider: 1   Time Spent (min): 15

## 2022-06-03 NOTE — LETTER
[address]  [address], [address]        [place]   [address] [address]           06/07/22     Dear Hussein Bullock,    We tried to reach you recently regarding your Simvastatin , but were unable to reach you on the telephone. . If you are no longer taking or taking differently, please call us at [phone] Option 2, that we can discuss this and update your medication profile. Simvastatin has been filled and is ready at: [address] [facility], [address] [facility] [address] Phone:  [phone]. Please pick this medication up as soon as possible if you have not already done so. It appears that this medication has not been filled at proper times. We are worried you might be missing doses or not taking it as directed. It is important that you take your medications regularly and try not to miss a single dose. Simvastatin not only helps to lower bad cholesterol, but has many other benefits. This is not a medication we \"feel\" working, so it can be difficult to remember to take. While you may not feel anything, remember to take this medication every day, so that you are getting the full benefit of this medication. It will help prevent heart attack, stroke, and other cardiovascular disease such as atherosclerosis- the hardening of your arteries.    Some ways to help you remember to take and refill your medications are to:  · Use a pill box, set an alarm, and/or keep your medication near something that you do every day  · Fill a 3-month supply of your prescription at a time to save you time and trips to the pharmacy  if you would like assistance in switching your prescriptions to a 3-month supply, please contact [phone] Option 2  · Ask your pharmacy if they participate in [facility]", a program where you can  all of your medications on the same day  · Ask your pharmacy if you can be set up with automatic refill, where they will automatically refill your prescription when it is due and let you know it's ready to     Sincerely,   Macarena Owens, PharmD, [address] [facility], toll free: [phone] Option 2

## 2022-06-06 RX ORDER — SIMVASTATIN 10 MG/1
10 TABLET, FILM COATED ORAL
Qty: 90 TABLET | Refills: 1 | Status: SHIPPED | OUTPATIENT
Start: 2022-06-06 | End: 2022-06-20 | Stop reason: SDUPTHER

## 2022-06-20 ENCOUNTER — TELEPHONE (OUTPATIENT)
Dept: FAMILY MEDICINE CLINIC | Age: 67
End: 2022-06-20

## 2022-06-20 ENCOUNTER — OFFICE VISIT (OUTPATIENT)
Dept: FAMILY MEDICINE CLINIC | Age: 67
End: 2022-06-20
Payer: MEDICARE

## 2022-06-20 VITALS
RESPIRATION RATE: 15 BRPM | HEIGHT: 65 IN | DIASTOLIC BLOOD PRESSURE: 62 MMHG | SYSTOLIC BLOOD PRESSURE: 120 MMHG | TEMPERATURE: 97.9 F | OXYGEN SATURATION: 97 % | WEIGHT: 200 LBS | BODY MASS INDEX: 33.32 KG/M2 | HEART RATE: 88 BPM

## 2022-06-20 DIAGNOSIS — E78.2 MIXED HYPERLIPIDEMIA: ICD-10-CM

## 2022-06-20 DIAGNOSIS — E55.9 HYPOVITAMINOSIS D: ICD-10-CM

## 2022-06-20 DIAGNOSIS — Z00.00 MEDICARE ANNUAL WELLNESS VISIT, SUBSEQUENT: ICD-10-CM

## 2022-06-20 DIAGNOSIS — Z78.0 MENOPAUSE: ICD-10-CM

## 2022-06-20 DIAGNOSIS — N18.31 STAGE 3A CHRONIC KIDNEY DISEASE (HCC): ICD-10-CM

## 2022-06-20 DIAGNOSIS — E11.42 TYPE 2 DIABETES MELLITUS WITH DIABETIC POLYNEUROPATHY, WITH LONG-TERM CURRENT USE OF INSULIN (HCC): Primary | ICD-10-CM

## 2022-06-20 DIAGNOSIS — Z79.4 TYPE 2 DIABETES MELLITUS WITH DIABETIC POLYNEUROPATHY, WITH LONG-TERM CURRENT USE OF INSULIN (HCC): Primary | ICD-10-CM

## 2022-06-20 PROBLEM — N18.30 CHRONIC RENAL DISEASE, STAGE III (HCC): Status: ACTIVE | Noted: 2022-06-20

## 2022-06-20 PROCEDURE — G8754 DIAS BP LESS 90: HCPCS | Performed by: LEGAL MEDICINE

## 2022-06-20 PROCEDURE — 1123F ACP DISCUSS/DSCN MKR DOCD: CPT | Performed by: LEGAL MEDICINE

## 2022-06-20 PROCEDURE — G8536 NO DOC ELDER MAL SCRN: HCPCS | Performed by: LEGAL MEDICINE

## 2022-06-20 PROCEDURE — 1101F PT FALLS ASSESS-DOCD LE1/YR: CPT | Performed by: LEGAL MEDICINE

## 2022-06-20 PROCEDURE — 3051F HG A1C>EQUAL 7.0%<8.0%: CPT | Performed by: LEGAL MEDICINE

## 2022-06-20 PROCEDURE — G8752 SYS BP LESS 140: HCPCS | Performed by: LEGAL MEDICINE

## 2022-06-20 PROCEDURE — G8427 DOCREV CUR MEDS BY ELIG CLIN: HCPCS | Performed by: LEGAL MEDICINE

## 2022-06-20 PROCEDURE — G8399 PT W/DXA RESULTS DOCUMENT: HCPCS | Performed by: LEGAL MEDICINE

## 2022-06-20 PROCEDURE — G8417 CALC BMI ABV UP PARAM F/U: HCPCS | Performed by: LEGAL MEDICINE

## 2022-06-20 PROCEDURE — 2022F DILAT RTA XM EVC RTNOPTHY: CPT | Performed by: LEGAL MEDICINE

## 2022-06-20 PROCEDURE — 3017F COLORECTAL CA SCREEN DOC REV: CPT | Performed by: LEGAL MEDICINE

## 2022-06-20 PROCEDURE — G9717 DOC PT DX DEP/BP F/U NT REQ: HCPCS | Performed by: LEGAL MEDICINE

## 2022-06-20 PROCEDURE — 99214 OFFICE O/P EST MOD 30 MIN: CPT | Performed by: LEGAL MEDICINE

## 2022-06-20 PROCEDURE — 1090F PRES/ABSN URINE INCON ASSESS: CPT | Performed by: LEGAL MEDICINE

## 2022-06-20 RX ORDER — SIMVASTATIN 10 MG/1
10 TABLET, FILM COATED ORAL
Qty: 90 TABLET | Refills: 1 | Status: SHIPPED | OUTPATIENT
Start: 2022-06-20 | End: 2022-07-28 | Stop reason: SDUPTHER

## 2022-06-20 RX ORDER — AMITRIPTYLINE HYDROCHLORIDE 100 MG/1
100 TABLET, FILM COATED ORAL
COMMUNITY
End: 2022-07-28 | Stop reason: SDUPTHER

## 2022-06-20 RX ORDER — MELOXICAM 15 MG/1
15 TABLET ORAL DAILY
COMMUNITY
Start: 2022-05-25 | End: 2022-06-20

## 2022-06-20 NOTE — PROGRESS NOTES
Leo Archer is a 74 y.o. female (: 1955) presenting to address:    Chief Complaint   Patient presents with    Follow-up       Vitals:    22 1124   BP: 120/62   Pulse: 88   Resp: 15   Temp: 97.9 °F (36.6 °C)   TempSrc: Temporal   SpO2: 97%   Weight: 200 lb (90.7 kg)   Height: 5' 5\" (1.651 m)   PainSc:   0 - No pain       Hearing/Vision:   No exam data present    Learning Assessment:     Learning Assessment 3/10/2014   PRIMARY LEARNER Patient   PRIMARY LANGUAGE ENGLISH   LEARNER PREFERENCE PRIMARY OTHER (COMMENT)   ANSWERED BY patient   RELATIONSHIP SELF     Depression Screening:     3 most recent PHQ Screens 2022   PHQ Not Done -   Little interest or pleasure in doing things Not at all   Feeling down, depressed, irritable, or hopeless Not at all   Total Score PHQ 2 0     Fall Risk Assessment:     Fall Risk Assessment, last 12 mths 2022   Able to walk? Yes   Fall in past 12 months? 0   Do you feel unsteady? 0   Are you worried about falling 0     Abuse Screening:     Abuse Screening Questionnaire 3/7/2022   Do you ever feel afraid of your partner? N   Are you in a relationship with someone who physically or mentally threatens you? N   Is it safe for you to go home?  Y     ADL Assessment:     ADL Assessment 2019   Feeding yourself No Help Needed   Getting from bed to chair No Help Needed   Getting dressed No Help Needed   Bathing or showering No Help Needed   Walk across the room (includes cane/walker) No Help Needed   Using the telphone No Help Needed   Taking your medications No Help Needed   Preparing meals No Help Needed   Managing money (expenses/bills) No Help Needed   Moderately strenuous housework (laundry) No Help Needed   Shopping for personal items (toiletries/medicines) No Help Needed   Shopping for groceries No Help Needed   Driving No Help Needed   Climbing a flight of stairs No Help Needed   Getting to places beyond walking distances No Help Needed        Coordination of Care Questionaire:   1. \"Have you been to the ER, urgent care clinic since your last visit? Hospitalized since your last visit? \" No    2. \"Have you seen or consulted any other health care providers outside of the [address] [place] [address] since your last visit? \" No     3. For patients aged 39-70: Has the patient had a colonoscopy / FIT/ Cologuard? Yes - no Care Gap present, pt is scheduled in Sept 2022    If the patient is female:    4. For patients aged 41-77: Has the patient had a mammogram within the past 2 years? No  See top three    5. For patients aged 21-65: Has the patient had a pap smear? No    Advanced Directive:   1. Do you have an Advanced Directive? NO    2. Would you like information on Advanced Directives?  NO

## 2022-06-20 NOTE — PROGRESS NOTES
Maricel Zepeda Angela     Chief Complaint   Patient presents with    Follow-up     Vitals:    06/20/22 1124   BP: 120/62   Pulse: 88   Resp: 15   Temp: 97.9 °F (36.6 °C)   TempSrc: Temporal   SpO2: 97%   Weight: 200 lb (90.7 kg)   Height: 5' 5\" (1.651 m)   PainSc:   0 - No pain         HPI: Mrs. Vic Callejas is here for follow up has been stressed out  Due to family issues , she has been taking care of her grandchildren    Eye exam she had disc eye examination    Past Medical History:   Diagnosis Date    Cataract     CVA, old, cognitive deficits     Depression 1/13/2015    Diabetes ([address]     Diabetic neuropathy ([address] 9/1/2013    Diabetic retinopathy ([address] 3/2/2012    DM (diabetes mellitus) ([address] 3/2/2012    GERD (gastroesophageal reflux disease) 4/21/2011    Glaucoma     Heel spur 11/24/2014    HTN (hypertension) 4/21/2011    Hyperlipidemia 3/2/2012    Insomnia 4/21/2011    Kidney stone     Obesity (BMI 30-39. 9) 7/16/2018    Retained ureteral stent     Schizophrenia ([address] 4/21/2011    Sleep apnea, obstructive 9/1/2013    Stroke ([address] 08/2018    pt states her Neurologist diagnosed her with stroke in August    Urinary frequency      Past Surgical History:   Procedure Laterality Date    HX OPEN CHOLECYSTECTOMY      HX UROLOGICAL Left 09/2017    Cystoscopy, Left retrograde pyelography, Left ureteroscopic stone extraction with laser lithotripsy and basket stone extraction,Left 6 x 24 cm double-J ureteral stent placement, Interpretation of urography.   [facility] - Dr Anika Bourgeois, [facility]  1970    left benign     Social History     Tobacco Use    Smoking status: Current Every Day Smoker     Packs/day: 0.50     Years: 21.00     Pack years: 10.50     Types: Cigarettes    Smokeless tobacco: Former User   Substance Use Topics    Alcohol use: No       Family History   Family history unknown: Yes       Review of Systems   Constitutional: Negative for chills, fever, malaise/fatigue and weight loss.   HENT: Negative for congestion, ear discharge, ear pain, hearing loss, nosebleeds, sinus pain and sore throat. Eyes: Negative for blurred vision, double vision and discharge. Respiratory: Negative for cough, hemoptysis, sputum production, shortness of breath and wheezing. Cardiovascular: Negative for chest pain, palpitations, claudication and leg swelling. Gastrointestinal: Negative for abdominal pain, constipation, diarrhea, nausea and vomiting. Genitourinary: Negative for dysuria, frequency and urgency. Musculoskeletal: Negative for back pain, joint pain, myalgias and neck pain. Skin: Negative for itching and rash. Neurological: Negative for dizziness, tingling, sensory change, speech change, focal weakness, weakness and headaches. Psychiatric/Behavioral: Negative for depression and suicidal ideas. Stressed        Physical Exam  Vitals and nursing note reviewed. Constitutional:       General: She is not in acute distress. Appearance: She is well-developed. She is not diaphoretic. HENT:      Head: Normocephalic and atraumatic. Eyes:      General: No scleral icterus. Right eye: No discharge. Left eye: No discharge. Conjunctiva/sclera: Conjunctivae normal.      Pupils: Pupils are equal, round, and reactive to light. Neck:      Thyroid: No thyromegaly. Cardiovascular:      Rate and Rhythm: Normal rate and regular rhythm. Heart sounds: Normal heart sounds. Pulmonary:      Effort: Pulmonary effort is normal. No respiratory distress. Breath sounds: Normal breath sounds. No wheezing or rales. Chest:      Chest wall: No tenderness. Abdominal:      General: There is no distension. Palpations: Abdomen is soft. Tenderness: There is no abdominal tenderness. There is no rebound. Musculoskeletal:         General: No tenderness or deformity. Normal range of motion. Right lower leg: No edema. Left lower leg: No edema. Lymphadenopathy:      Cervical: No cervical adenopathy. Skin:     General: Skin is warm and dry. Coloration: Skin is not pale. Findings: No erythema or rash. Neurological:      Mental Status: She is alert and oriented to person, place, and time. Cranial Nerves: No cranial nerve deficit. Coordination: Coordination normal.   Psychiatric:         Behavior: Behavior normal.         Thought Content: Thought content normal.         Judgment: Judgment normal.          Assessment and plan     Plan of care has been discussed with the patient, he agrees to the plan and verbalized understanding. All his questions were answered  More than 50% of the time spent in this visit was counseling the patient about  illness and treatment options         1. Type 2 diabetes mellitus with diabetic polyneuropathy, with long-term current use of insulin ([facility])  Well controlled  DM last HB A1C 7.1    2. Mixed hyperlipidemia  She is adherent   - simvastatin (ZOCOR) 10 mg tablet; Take 1 Tablet by mouth nightly. Dispense: 90 Tablet; Refill: 1    3. Stage 3a chronic kidney disease (HCC)    Stable kidney function  4. Menopause- DEXA BONE DENSITY STUDY AXIAL; Future    5. Body mass index (BMI) of 33.0-33.9 in adult  Lifestyle modification has been discussed with patient in details, decrease carbohydrates, decrease or eliminate sugar intake, gradually increase physical activity as tolerated to be about 4 to 5 hours a week. Current Outpatient Medications   Medication Sig Dispense Refill    amitriptyline (ELAVIL) 100 mg tablet Take 100 mg by mouth nightly.  simvastatin (ZOCOR) 10 mg tablet Take 1 Tablet by mouth nightly.  90 Tablet 1    glipiZIDE SR (GLUCOTROL XL) 10 mg CR tablet TAKE 1 TABLET BY MOUTH TWICE DAILY 180 Tablet 0    insulin glargine (Lantus Solostar U-100 Insulin) 100 unit/mL (3 mL) inpn INJECT 55 UNITS SUBCUTANEOUSLY AT BEDTIME 15 mL 3    amLODIPine (NORVASC) 10 mg tablet TAKE 1 TABLET BY MOUTH EVERY DAY 90 Tablet 1    pantoprazole (PROTONIX) 40 mg tablet TAKE 1 TABLET BY MOUTH EVERY DAY 90 Tablet 1    Insulin Needles, Disposable, (Rosalva Pen Needle) 32 gauge x 5/32\" ndle AS DIRECTED ONCE A  Pen Needle 1    bimatoprost (Lumigan) 0.01 % ophthalmic drops Administer 1 Drop to both eyes nightly.  multivitamin (ONE A DAY) tablet Take 1 Tab by mouth daily.  aspirin delayed-release 81 mg tablet Take  by mouth daily.  ALCOHOL PREP PADS padm USE TWICE DAILY AS DIRECTED 200 Pad 1    Ferrous Sulfate (SLOW FE) 47.5 mg iron TbER tablet Take 1 Tab by mouth daily. 90 Tab 1    Blood-Glucose Meter (CONTOUR METER) monitoring kit DX E11.9 1 Kit 0    cholecalciferol, vitamin d3, (VITAMIN D) 1,000 unit tablet Take  by mouth daily. Patient Active Problem List    Diagnosis Date Noted    Chronic renal disease, stage III 06/20/2022    CVA, old, cognitive deficits     Obesity (BMI 30-39.9) 07/16/2018    Severe obesity (BMI 35.0-39. 9) with comorbidity ([facility] 05/07/2018    Type 2 diabetes with nephropathy ([facility] 03/05/2018    Depression 01/13/2015    Heel spur 11/24/2014    Sleep apnea, obstructive 09/01/2013    Diabetic neuropathy ([facility] 09/01/2013    DM (diabetes mellitus) ([facility] 03/02/2012    Hyperlipidemia 03/02/2012    Diabetic retinopathy ([facility] 03/02/2012    Cataract     Glaucoma     HTN (hypertension) 04/21/2011    Insomnia 04/21/2011    GERD (gastroesophageal reflux disease) 04/21/2011    Schizophrenia ([facility] 04/21/2011     Results for orders placed or performed in visit on 03/07/22   COLOGUARD TEST (FECAL DNA COLORECTAL CANCER SCREENING)   Result Value Ref Range    Cologuard Test, External Positive    AMB POC HEMOGLOBIN A1C   Result Value Ref Range    Hemoglobin A1c (POC) 7.1 %     No visits with results within 3 Month(s) from this visit.    Latest known visit with results is:   Office Visit on 03/07/2022   Component Date Value Ref Range Status    Hemoglobin A1c (POC) 03/07/2022 7.1 % Final    Cologuard Test, External 03/14/2022 Positive   Final          Follow-up and Dispositions    · Return in about 3 months (around 9/20/2022) for for medicare wellness.

## 2022-06-22 ENCOUNTER — TELEPHONE (OUTPATIENT)
Dept: PHARMACY | Age: 67
End: 2022-06-22

## 2022-06-22 NOTE — TELEPHONE ENCOUNTER
[facility] CLINICAL PHARMACY: ADHERENCE REVIEW  Identified care gap per [employer]: fills at [facility]: Statin adherence    Last Visit: 06/20/2022    Patient also appears to be prescribed:  Glipizide SR 10mg    Patient found in Outcomes MTM and is currently eligible for TIP    ASSESSMENT  STATIN ADHERENCE    Per [facility] Portal:   Simvastatin 10mg last filled on 06/20/2022 for 90 day supply. Per Count includes the [facility]:   Simvastatin 10mg last picked up on 06/20/2022 for 90 day supply. 1 refills remaining. Billed through [employer] system master Rx Value Date/Time    Cholesterol, total 145 02/28/2022 01:20 PM    HDL Cholesterol 40 02/28/2022 01:20 PM    LDL, calculated 62.8 02/28/2022 01:20 PM    VLDL, calculated 42.2 02/28/2022 01:20 PM    Triglyceride 211 (H) 02/28/2022 01:20 PM    CHOL/HDL Ratio 3.6 02/28/2022 01:20 PM     ALT (SGPT)   Date Value Ref Range Status   02/28/2022 28 13 - 56 U/L Final     AST (SGOT)   Date Value Ref Range Status   02/28/2022 9 (L) 10 - 38 U/L Final     The ASCVD Risk score (Park Barrios, et al., 2013) failed to calculate for the following reasons: The patient has a prior MI or stroke diagnosis     PLAN  The following are interventions that have been identified:  None    No patient out reach planned at this time.  Patient has supply and refills remaining         Future Appointments   Date Time Provider Kapil Sky   9/20/2022  2:00 PM Linda Koo MD [address]  Direct: [phone]  Department, toll free:[phone], [address] [facility] in place: No   Gap Closed?: Yes   Time Spent (min): 20

## 2022-07-01 DIAGNOSIS — I10 ESSENTIAL HYPERTENSION: ICD-10-CM

## 2022-07-01 RX ORDER — AMLODIPINE BESYLATE 10 MG/1
TABLET ORAL
Qty: 90 TABLET | Refills: 1 | Status: SHIPPED | OUTPATIENT
Start: 2022-07-01 | End: 2022-07-28 | Stop reason: SDUPTHER

## 2022-07-01 NOTE — TELEPHONE ENCOUNTER
Requested Prescriptions     Pending Prescriptions Disp Refills    amLODIPine (NORVASC) 10 mg tablet 90 Tablet 1     Sig: TAKE 1 TABLET BY MOUTH EVERY DAY     Pt called to request a refill on this medication. Pt states has been out of the medication for a week and has been experiencing headaches. Please advise.      Future Appointments   Date Time Provider Kpail Sky   9/20/2022  2:00 PM MD VINIICUS Lambetr BS AMB

## 2022-07-25 DIAGNOSIS — E11.9 DIABETES MELLITUS WITHOUT COMPLICATION (HCC): ICD-10-CM

## 2022-07-25 NOTE — TELEPHONE ENCOUNTER
Pt called with Rx refill request.  Requested Prescriptions     Pending Prescriptions Disp Refills    glipiZIDE SR (GLUCOTROL XL) 10 mg CR tablet [Pharmacy Med Name: GLIPIZIDE ER 10MG TABLETS] 180 Tablet 0     Sig: TAKE 1 TABLET BY MOUTH TWICE DAILY

## 2022-07-28 DIAGNOSIS — I10 ESSENTIAL HYPERTENSION: ICD-10-CM

## 2022-07-28 DIAGNOSIS — E78.2 MIXED HYPERLIPIDEMIA: ICD-10-CM

## 2022-07-28 DIAGNOSIS — K21.9 GASTROESOPHAGEAL REFLUX DISEASE, UNSPECIFIED WHETHER ESOPHAGITIS PRESENT: ICD-10-CM

## 2022-07-28 DIAGNOSIS — F32.A DEPRESSION, UNSPECIFIED DEPRESSION TYPE: Primary | ICD-10-CM

## 2022-07-28 DIAGNOSIS — E11.9 DIABETES MELLITUS WITHOUT COMPLICATION (HCC): ICD-10-CM

## 2022-07-28 RX ORDER — GLIPIZIDE 10 MG/1
TABLET, FILM COATED, EXTENDED RELEASE ORAL
Qty: 180 TABLET | Refills: 0 | Status: SHIPPED | OUTPATIENT
Start: 2022-07-28 | End: 2022-10-25

## 2022-07-28 RX ORDER — AMITRIPTYLINE HYDROCHLORIDE 100 MG/1
100 TABLET, FILM COATED ORAL
Qty: 90 TABLET | Refills: 0 | Status: SHIPPED | OUTPATIENT
Start: 2022-07-28 | End: 2022-09-21

## 2022-07-28 RX ORDER — AMLODIPINE BESYLATE 10 MG/1
TABLET ORAL
Qty: 90 TABLET | Refills: 1 | Status: SHIPPED | OUTPATIENT
Start: 2022-07-28

## 2022-07-28 RX ORDER — GLIPIZIDE 10 MG/1
10 TABLET, FILM COATED, EXTENDED RELEASE ORAL 2 TIMES DAILY
Qty: 180 TABLET | Refills: 0 | OUTPATIENT
Start: 2022-07-28

## 2022-07-28 RX ORDER — PANTOPRAZOLE SODIUM 40 MG/1
40 TABLET, DELAYED RELEASE ORAL DAILY
Qty: 90 TABLET | Refills: 1 | Status: SHIPPED | OUTPATIENT
Start: 2022-07-28

## 2022-07-28 RX ORDER — SIMVASTATIN 10 MG/1
10 TABLET, FILM COATED ORAL
Qty: 90 TABLET | Refills: 1 | Status: SHIPPED | OUTPATIENT
Start: 2022-07-28

## 2022-07-28 NOTE — TELEPHONE ENCOUNTER
PT called with Rx Refill Request. Pt is out of glipiZIDE SR. Pt also requested Ferrous Sulfate (SLOW FE) 25.6SC, but when I click to reorder it say \"The following medication records are no longer available for ordering. Place a new order with a different medication record. \"    Requested Prescriptions     Pending Prescriptions Disp Refills    pantoprazole (PROTONIX) 40 mg tablet 90 Tablet 1     Sig: Take 1 Tablet by mouth in the morning. amLODIPine (NORVASC) 10 mg tablet 90 Tablet 1     Sig: TAKE 1 TABLET BY MOUTH EVERY DAY    glipiZIDE SR (GLUCOTROL XL) 10 mg CR tablet 180 Tablet 0     Sig: Take 1 Tablet by mouth two (2) times a day. amitriptyline (ELAVIL) 100 mg tablet       Sig: Take 1 Tablet by mouth nightly. simvastatin (ZOCOR) 10 mg tablet 90 Tablet 1     Sig: Take 1 Tablet by mouth nightly.

## 2022-07-28 NOTE — TELEPHONE ENCOUNTER
Pt called again in regards to her medication request. She states she is out of the medication. Please advise.      Future Appointments   Date Time Provider Kapil Sky   9/13/2022  1:10 PM LAB[facility] AUDREYMA BS AMB   9/20/2022  2:00 PM Virgilio Mcleod MD [facility] AMB

## 2022-08-10 DIAGNOSIS — Z79.4 TYPE 2 DIABETES MELLITUS WITH DIABETIC POLYNEUROPATHY, WITH LONG-TERM CURRENT USE OF INSULIN (HCC): ICD-10-CM

## 2022-08-10 DIAGNOSIS — E11.42 TYPE 2 DIABETES MELLITUS WITH DIABETIC POLYNEUROPATHY, WITH LONG-TERM CURRENT USE OF INSULIN (HCC): ICD-10-CM

## 2022-08-10 RX ORDER — INSULIN GLARGINE 100 [IU]/ML
INJECTION, SOLUTION SUBCUTANEOUS
Qty: 15 ML | Refills: 3 | Status: SHIPPED | OUTPATIENT
Start: 2022-08-10

## 2022-08-10 NOTE — TELEPHONE ENCOUNTER
Pt Called with Rx Refill Request.  Requested Prescriptions     Pending Prescriptions Disp Refills    insulin glargine (Lantus Solostar U-100 Insulin) 100 unit/mL (3 mL) inpn [Pharmacy Med Name: LANTUS SOLOSTAR PEN INJ 3ML] 15 mL 3     Sig: ADMINISTER 55 UNITS UNDER THE SKIN AT BEDTIME

## 2022-08-16 ENCOUNTER — TELEPHONE (OUTPATIENT)
Dept: FAMILY MEDICINE CLINIC | Age: 67
End: 2022-08-16

## 2022-08-16 NOTE — TELEPHONE ENCOUNTER
Paresh Alarcon from Dr. Aiden Byrne office; pt is scheduled for colonoscopy on 9/2/22; pt has not scheduled a pre-op appt w/PCP; Paresh Alarcon states a pre-op form was mailed/faxed to our office; I informed Paresh Alarcon, our pre-ops require an in office appt; PCP is out of office for 2 weeks, returns 8/29/22; Paresh Alarcon states pt will need to schedule procedure for next year.

## 2022-08-26 ENCOUNTER — TELEPHONE (OUTPATIENT)
Dept: FAMILY MEDICINE CLINIC | Age: 67
End: 2022-08-26

## 2022-08-26 NOTE — TELEPHONE ENCOUNTER
Received refill request for medication Meloxicam, spoke w/ pharmacy to inform that medication was discontinued 5/25/2022.

## 2022-09-08 ENCOUNTER — TELEPHONE (OUTPATIENT)
Dept: FAMILY MEDICINE CLINIC | Age: 67
End: 2022-09-08

## 2022-09-08 NOTE — TELEPHONE ENCOUNTER
Received results of bone density scan it is within normal limits need to repeat in 5 years continue physical activities which calcium diet and vitamin D supplements

## 2022-09-12 DIAGNOSIS — E78.2 MIXED HYPERLIPIDEMIA: ICD-10-CM

## 2022-09-12 RX ORDER — MELOXICAM 15 MG/1
15 TABLET ORAL DAILY
Status: CANCELLED | OUTPATIENT
Start: 2022-09-12

## 2022-09-12 RX ORDER — SIMVASTATIN 10 MG/1
10 TABLET, FILM COATED ORAL
Qty: 90 TABLET | Refills: 1 | Status: CANCELLED | OUTPATIENT
Start: 2022-09-12

## 2022-09-13 ENCOUNTER — HOSPITAL ENCOUNTER (OUTPATIENT)
Dept: LAB | Age: 67
Discharge: HOME | End: 2022-09-13
Payer: MEDICARE

## 2022-09-13 ENCOUNTER — APPOINTMENT (OUTPATIENT)
Dept: FAMILY MEDICINE CLINIC | Age: 67
End: 2022-09-13

## 2022-09-13 DIAGNOSIS — E78.2 MIXED HYPERLIPIDEMIA: ICD-10-CM

## 2022-09-13 DIAGNOSIS — Z00.00 MEDICARE ANNUAL WELLNESS VISIT, SUBSEQUENT: ICD-10-CM

## 2022-09-13 DIAGNOSIS — E55.9 HYPOVITAMINOSIS D: ICD-10-CM

## 2022-09-13 DIAGNOSIS — E11.42 TYPE 2 DIABETES MELLITUS WITH DIABETIC POLYNEUROPATHY, WITH LONG-TERM CURRENT USE OF INSULIN (HCC): ICD-10-CM

## 2022-09-13 DIAGNOSIS — N18.31 STAGE 3A CHRONIC KIDNEY DISEASE (HCC): ICD-10-CM

## 2022-09-13 DIAGNOSIS — Z79.4 TYPE 2 DIABETES MELLITUS WITH DIABETIC POLYNEUROPATHY, WITH LONG-TERM CURRENT USE OF INSULIN (HCC): ICD-10-CM

## 2022-09-13 LAB
25(OH)D3 SERPL-MCNC: 105.3 NG/ML (ref 30–100)
ALBUMIN SERPL-MCNC: 3.9 G/DL (ref 3.4–5)
ALBUMIN/GLOB SERPL: 1.1 {RATIO} (ref 0.8–1.7)
ALP SERPL-CCNC: 91 U/L (ref 45–117)
ALT SERPL-CCNC: 27 U/L (ref 13–56)
ANION GAP SERPL CALC-SCNC: 7 MMOL/L (ref 3–18)
AST SERPL-CCNC: 18 U/L (ref 10–38)
BASOPHILS # BLD: 0.1 K/UL (ref 0–0.1)
BASOPHILS NFR BLD: 1 % (ref 0–2)
BILIRUB SERPL-MCNC: 0.5 MG/DL (ref 0.2–1)
BUN SERPL-MCNC: 13 MG/DL (ref 7–18)
BUN/CREAT SERPL: 12 (ref 12–20)
CALCIUM SERPL-MCNC: 9.7 MG/DL (ref 8.5–10.1)
CHLORIDE SERPL-SCNC: 106 MMOL/L (ref 100–111)
CHOLEST SERPL-MCNC: 147 MG/DL
CO2 SERPL-SCNC: 28 MMOL/L (ref 21–32)
CREAT SERPL-MCNC: 1.07 MG/DL (ref 0.6–1.3)
DIFFERENTIAL METHOD BLD: ABNORMAL
EOSINOPHIL # BLD: 0.1 K/UL (ref 0–0.4)
EOSINOPHIL NFR BLD: 1 % (ref 0–5)
ERYTHROCYTE [DISTWIDTH] IN BLOOD BY AUTOMATED COUNT: 13 % (ref 11.6–14.5)
GLOBULIN SER CALC-MCNC: 3.4 G/DL (ref 2–4)
GLUCOSE SERPL-MCNC: 63 MG/DL (ref 74–99)
HBA1C MFR BLD: 6.8 % (ref 4.2–5.6)
HCT VFR BLD AUTO: 42.3 % (ref 35–45)
HDLC SERPL-MCNC: 57 MG/DL (ref 40–60)
HDLC SERPL: 2.6 {RATIO} (ref 0–5)
HGB BLD-MCNC: 13.6 G/DL (ref 12–16)
IMM GRANULOCYTES # BLD AUTO: 0 K/UL (ref 0–0.04)
IMM GRANULOCYTES NFR BLD AUTO: 0 % (ref 0–0.5)
LDLC SERPL CALC-MCNC: 65.4 MG/DL (ref 0–100)
LIPID PROFILE,FLP: NORMAL
LYMPHOCYTES # BLD: 2.7 K/UL (ref 0.9–3.6)
LYMPHOCYTES NFR BLD: 22 % (ref 21–52)
MCH RBC QN AUTO: 29.4 PG (ref 24–34)
MCHC RBC AUTO-ENTMCNC: 32.2 G/DL (ref 31–37)
MCV RBC AUTO: 91.6 FL (ref 78–100)
MONOCYTES # BLD: 0.8 K/UL (ref 0.05–1.2)
MONOCYTES NFR BLD: 6 % (ref 3–10)
NEUTS SEG # BLD: 8.6 K/UL (ref 1.8–8)
NEUTS SEG NFR BLD: 70 % (ref 40–73)
NRBC # BLD: 0 K/UL (ref 0–0.01)
NRBC BLD-RTO: 0 PER 100 WBC
PLATELET # BLD AUTO: 433 K/UL (ref 135–420)
PMV BLD AUTO: 10.4 FL (ref 9.2–11.8)
POTASSIUM SERPL-SCNC: 4.3 MMOL/L (ref 3.5–5.5)
PROT SERPL-MCNC: 7.3 G/DL (ref 6.4–8.2)
RBC # BLD AUTO: 4.62 M/UL (ref 4.2–5.3)
SODIUM SERPL-SCNC: 141 MMOL/L (ref 136–145)
TRIGL SERPL-MCNC: 123 MG/DL (ref ?–150)
VLDLC SERPL CALC-MCNC: 24.6 MG/DL
WBC # BLD AUTO: 12.3 K/UL (ref 4.6–13.2)

## 2022-09-13 PROCEDURE — 85025 COMPLETE CBC W/AUTO DIFF WBC: CPT

## 2022-09-13 PROCEDURE — 80053 COMPREHEN METABOLIC PANEL: CPT

## 2022-09-13 PROCEDURE — 80061 LIPID PANEL: CPT

## 2022-09-13 PROCEDURE — 83036 HEMOGLOBIN GLYCOSYLATED A1C: CPT

## 2022-09-13 PROCEDURE — 36415 COLL VENOUS BLD VENIPUNCTURE: CPT

## 2022-09-13 PROCEDURE — 82306 VITAMIN D 25 HYDROXY: CPT

## 2022-09-13 NOTE — TELEPHONE ENCOUNTER
Patient called requesting refill on medication. Future Appointments   Date Time Provider Kapil Sky   10/5/2022  1:30 PM MD VINICIUS Portillo BS AMB     Requested Prescriptions     Pending Prescriptions Disp Refills    meloxicam (MOBIC) 15 mg tablet       Sig: Take 1 Tablet by mouth daily.

## 2022-09-15 ENCOUNTER — TELEPHONE (OUTPATIENT)
Dept: FAMILY MEDICINE CLINIC | Age: 67
End: 2022-09-15

## 2022-09-15 RX ORDER — MELOXICAM 15 MG/1
15 TABLET ORAL DAILY
OUTPATIENT
Start: 2022-09-15

## 2022-09-15 NOTE — TELEPHONE ENCOUNTER
This medication is contraindicated with a stage III kidney disease    Patient to schedule follow-up to discuss treatment options

## 2022-09-15 NOTE — TELEPHONE ENCOUNTER
Pt is requesting a new prescription for doxepin 50 mg to help her sleep. Pt stated that she was taking trazodone but it made her sick. She stopped taking 2 months ago. Pt was informed that she may need an appt first before the medication could be sent to the pharmacy.  Please advise     Last OV 6/20/22

## 2022-09-20 ENCOUNTER — TELEPHONE (OUTPATIENT)
Dept: FAMILY MEDICINE CLINIC | Age: 67
End: 2022-09-20

## 2022-09-20 DIAGNOSIS — G47.00 INSOMNIA, UNSPECIFIED TYPE: Primary | ICD-10-CM

## 2022-09-20 NOTE — TELEPHONE ENCOUNTER
Pt called requesting a new medication: doxepin 50mg. States she hasn't slept in 2 months. Please advise.   Future Appointments   Date Time Provider Kapil Sky   10/5/2022  1:30 PM MD AUDREY LaceyMA BS AMB

## 2022-09-21 ENCOUNTER — TELEPHONE (OUTPATIENT)
Dept: MAMMOGRAPHY | Age: 67
End: 2022-09-21

## 2022-09-21 RX ORDER — DOXEPIN HYDROCHLORIDE 25 MG/1
25 CAPSULE ORAL
Qty: 30 CAPSULE | Refills: 0 | Status: SHIPPED | OUTPATIENT
Start: 2022-09-21 | End: 2022-10-20

## 2022-09-22 DIAGNOSIS — Z78.0 MENOPAUSE: ICD-10-CM

## 2022-09-22 NOTE — TELEPHONE ENCOUNTER
To start with 25 mg daily reevaluate in 2 weeks     If she start Dopexin need to taper off amitriptyline and to stop it eventually

## 2022-10-05 ENCOUNTER — OFFICE VISIT (OUTPATIENT)
Dept: FAMILY MEDICINE CLINIC | Age: 67
End: 2022-10-05
Payer: MEDICARE

## 2022-10-05 VITALS
DIASTOLIC BLOOD PRESSURE: 84 MMHG | HEART RATE: 101 BPM | RESPIRATION RATE: 15 BRPM | HEIGHT: 65 IN | TEMPERATURE: 98.4 F | BODY MASS INDEX: 33.14 KG/M2 | OXYGEN SATURATION: 96 % | SYSTOLIC BLOOD PRESSURE: 136 MMHG | WEIGHT: 198.9 LBS

## 2022-10-05 DIAGNOSIS — Z79.4 TYPE 2 DIABETES MELLITUS WITH DIABETIC POLYNEUROPATHY, WITH LONG-TERM CURRENT USE OF INSULIN (HCC): Primary | ICD-10-CM

## 2022-10-05 DIAGNOSIS — G47.00 INSOMNIA, UNSPECIFIED TYPE: ICD-10-CM

## 2022-10-05 DIAGNOSIS — R92.8 OTHER ABNORMAL AND INCONCLUSIVE FINDINGS ON DIAGNOSTIC IMAGING OF BREAST: ICD-10-CM

## 2022-10-05 DIAGNOSIS — E78.2 MIXED HYPERLIPIDEMIA: ICD-10-CM

## 2022-10-05 DIAGNOSIS — E11.42 TYPE 2 DIABETES MELLITUS WITH DIABETIC POLYNEUROPATHY, WITH LONG-TERM CURRENT USE OF INSULIN (HCC): Primary | ICD-10-CM

## 2022-10-05 DIAGNOSIS — L70.8 OTHER ACNE: ICD-10-CM

## 2022-10-05 DIAGNOSIS — N63.10 MASS OF RIGHT BREAST, UNSPECIFIED QUADRANT: ICD-10-CM

## 2022-10-05 DIAGNOSIS — I10 ESSENTIAL HYPERTENSION: ICD-10-CM

## 2022-10-05 PROCEDURE — 99214 OFFICE O/P EST MOD 30 MIN: CPT | Performed by: LEGAL MEDICINE

## 2022-10-05 PROCEDURE — G9717 DOC PT DX DEP/BP F/U NT REQ: HCPCS | Performed by: LEGAL MEDICINE

## 2022-10-05 PROCEDURE — G8417 CALC BMI ABV UP PARAM F/U: HCPCS | Performed by: LEGAL MEDICINE

## 2022-10-05 PROCEDURE — G8399 PT W/DXA RESULTS DOCUMENT: HCPCS | Performed by: LEGAL MEDICINE

## 2022-10-05 PROCEDURE — 3017F COLORECTAL CA SCREEN DOC REV: CPT | Performed by: LEGAL MEDICINE

## 2022-10-05 PROCEDURE — 1101F PT FALLS ASSESS-DOCD LE1/YR: CPT | Performed by: LEGAL MEDICINE

## 2022-10-05 PROCEDURE — G8754 DIAS BP LESS 90: HCPCS | Performed by: LEGAL MEDICINE

## 2022-10-05 PROCEDURE — G8536 NO DOC ELDER MAL SCRN: HCPCS | Performed by: LEGAL MEDICINE

## 2022-10-05 PROCEDURE — G8752 SYS BP LESS 140: HCPCS | Performed by: LEGAL MEDICINE

## 2022-10-05 PROCEDURE — 2022F DILAT RTA XM EVC RTNOPTHY: CPT | Performed by: LEGAL MEDICINE

## 2022-10-05 PROCEDURE — G8427 DOCREV CUR MEDS BY ELIG CLIN: HCPCS | Performed by: LEGAL MEDICINE

## 2022-10-05 PROCEDURE — 1123F ACP DISCUSS/DSCN MKR DOCD: CPT | Performed by: LEGAL MEDICINE

## 2022-10-05 PROCEDURE — 1090F PRES/ABSN URINE INCON ASSESS: CPT | Performed by: LEGAL MEDICINE

## 2022-10-05 PROCEDURE — 3044F HG A1C LEVEL LT 7.0%: CPT | Performed by: LEGAL MEDICINE

## 2022-10-05 RX ORDER — CLINDAMYCIN PHOSPHATE 10 MG/G
GEL TOPICAL 2 TIMES DAILY
Qty: 60 G | Refills: 0 | Status: SHIPPED | OUTPATIENT
Start: 2022-10-05

## 2022-10-05 RX ORDER — ASPIRIN 81 MG/1
TABLET ORAL DAILY
COMMUNITY
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
## 2022-10-05 NOTE — PROGRESS NOTES
Eduardo Alaniz is a 74 y.o. female (: 1955) presenting to address:    Chief Complaint   Patient presents with    Follow-up     Rash on face       Vitals:    10/05/22 1336   BP: 136/84   Pulse: (!) 101   Resp: 15   Temp: 98.4 °F (36.9 °C)   TempSrc: Temporal   SpO2: 96%   Weight: 198 lb 14.4 oz (90.2 kg)   Height: 5' 5\" (1.651 m)   PainSc:   0 - No pain       Hearing/Vision:   No results found. Learning Assessment:     Learning Assessment 3/10/2014   PRIMARY LEARNER Patient   PRIMARY LANGUAGE ENGLISH   LEARNER PREFERENCE PRIMARY OTHER (COMMENT)   ANSWERED BY patient   RELATIONSHIP SELF     Depression Screening:     3 most recent PHQ Screens 10/5/2022   PHQ Not Done -   Little interest or pleasure in doing things Not at all   Feeling down, depressed, irritable, or hopeless Not at all   Total Score PHQ 2 0     Fall Risk Assessment:     Fall Risk Assessment, last 12 mths 10/5/2022   Able to walk? Yes   Fall in past 12 months? 0   Do you feel unsteady? 0   Are you worried about falling 0     Abuse Screening:     Abuse Screening Questionnaire 2022   Do you ever feel afraid of your partner? N   Are you in a relationship with someone who physically or mentally threatens you? N   Is it safe for you to go home?  Y     ADL Assessment:     ADL Assessment 2019   Feeding yourself No Help Needed   Getting from bed to chair No Help Needed   Getting dressed No Help Needed   Bathing or showering No Help Needed   Walk across the room (includes cane/walker) No Help Needed   Using the telphone No Help Needed   Taking your medications No Help Needed   Preparing meals No Help Needed   Managing money (expenses/bills) No Help Needed   Moderately strenuous housework (laundry) No Help Needed   Shopping for personal items (toiletries/medicines) No Help Needed   Shopping for groceries No Help Needed   Driving No Help Needed   Climbing a flight of stairs No Help Needed   Getting to places beyond walking distances No Help Needed        Coordination of Care Questionaire:   1. \"Have you been to the ER, urgent care clinic since your last visit? Hospitalized since your last visit? \" No    2. \"Have you seen or consulted any other health care providers outside of the [address] [place] [address] since your last visit? \" No     3. For patients aged 39-70: Has the patient had a colonoscopy / FIT/ Cologuard? Yes - no Care Gap present    If the patient is female:    4. For patients aged 41-77: Has the patient had a mammogram within the past 2 years? No  See top three    5. For patients aged 21-65: Has the patient had a pap smear? No    Advanced Directive:   1. Do you have an Advanced Directive? NO    2. Would you like information on Advanced Directives? NO    Patient declined flu vaccine.

## 2022-10-19 DIAGNOSIS — G47.00 INSOMNIA, UNSPECIFIED TYPE: ICD-10-CM

## 2022-10-20 RX ORDER — DOXEPIN HYDROCHLORIDE 25 MG/1
CAPSULE ORAL
Qty: 30 CAPSULE | Refills: 2 | Status: SHIPPED | OUTPATIENT
Start: 2022-10-20

## 2022-10-24 DIAGNOSIS — E11.9 DIABETES MELLITUS WITHOUT COMPLICATION (HCC): ICD-10-CM

## 2022-10-25 RX ORDER — GLIPIZIDE 10 MG/1
TABLET, FILM COATED, EXTENDED RELEASE ORAL
Qty: 180 TABLET | Refills: 0 | Status: SHIPPED | OUTPATIENT
Start: 2022-10-25

## 2022-11-18 DIAGNOSIS — E11.42 TYPE 2 DIABETES MELLITUS WITH DIABETIC POLYNEUROPATHY, WITH LONG-TERM CURRENT USE OF INSULIN (HCC): ICD-10-CM

## 2022-11-18 DIAGNOSIS — Z79.4 TYPE 2 DIABETES MELLITUS WITH DIABETIC POLYNEUROPATHY, WITH LONG-TERM CURRENT USE OF INSULIN (HCC): ICD-10-CM

## 2022-11-21 RX ORDER — INSULIN GLARGINE 100 [IU]/ML
INJECTION, SOLUTION SUBCUTANEOUS
Qty: 15 ML | Refills: 3 | Status: SHIPPED | OUTPATIENT
Start: 2022-11-21

## 2022-11-23 RX ORDER — PEN NEEDLE, DIABETIC 31 GX3/16"
NEEDLE, DISPOSABLE MISCELLANEOUS
Qty: 100 PEN NEEDLE | Refills: 1 | Status: SHIPPED | OUTPATIENT
Start: 2022-11-23

## 2022-12-29 DIAGNOSIS — I10 ESSENTIAL HYPERTENSION: ICD-10-CM

## 2023-01-02 RX ORDER — AMLODIPINE BESYLATE 10 MG/1
TABLET ORAL
Qty: 90 TABLET | Refills: 1 | Status: SHIPPED | OUTPATIENT
Start: 2023-01-02

## 2023-01-15 DIAGNOSIS — G47.00 INSOMNIA, UNSPECIFIED TYPE: ICD-10-CM

## 2023-01-15 RX ORDER — DOXEPIN HYDROCHLORIDE 25 MG/1
CAPSULE ORAL
Qty: 30 CAPSULE | Refills: 2 | Status: SHIPPED | OUTPATIENT
Start: 2023-01-15

## 2023-01-21 DIAGNOSIS — E11.9 DIABETES MELLITUS WITHOUT COMPLICATION (HCC): ICD-10-CM

## 2023-01-23 RX ORDER — GLIPIZIDE 10 MG/1
TABLET, FILM COATED, EXTENDED RELEASE ORAL
Qty: 180 TABLET | Refills: 0 | Status: SHIPPED | OUTPATIENT
Start: 2023-01-23

## 2023-01-30 ENCOUNTER — HOSPITAL ENCOUNTER (OUTPATIENT)
Dept: LAB | Age: 68
Discharge: HOME | End: 2023-01-30
Payer: MEDICARE

## 2023-01-30 ENCOUNTER — OFFICE VISIT (OUTPATIENT)
Dept: FAMILY MEDICINE CLINIC | Age: 68
End: 2023-01-30
Payer: MEDICARE

## 2023-01-30 ENCOUNTER — TELEPHONE (OUTPATIENT)
Dept: FAMILY MEDICINE CLINIC | Age: 68
End: 2023-01-30

## 2023-01-30 ENCOUNTER — APPOINTMENT (OUTPATIENT)
Dept: FAMILY MEDICINE CLINIC | Age: 68
End: 2023-01-30

## 2023-01-30 VITALS
WEIGHT: 203 LBS | HEART RATE: 100 BPM | DIASTOLIC BLOOD PRESSURE: 78 MMHG | OXYGEN SATURATION: 97 % | SYSTOLIC BLOOD PRESSURE: 130 MMHG | HEIGHT: 65 IN | TEMPERATURE: 98.2 F | BODY MASS INDEX: 33.82 KG/M2 | RESPIRATION RATE: 14 BRPM

## 2023-01-30 DIAGNOSIS — E78.2 MIXED HYPERLIPIDEMIA: ICD-10-CM

## 2023-01-30 DIAGNOSIS — F20.9 SCHIZOPHRENIA, UNSPECIFIED TYPE (HCC): ICD-10-CM

## 2023-01-30 DIAGNOSIS — Z79.4 TYPE 2 DIABETES MELLITUS WITH DIABETIC POLYNEUROPATHY, WITH LONG-TERM CURRENT USE OF INSULIN (HCC): Primary | ICD-10-CM

## 2023-01-30 DIAGNOSIS — N18.31 STAGE 3A CHRONIC KIDNEY DISEASE (HCC): ICD-10-CM

## 2023-01-30 DIAGNOSIS — L70.8 OTHER ACNE: ICD-10-CM

## 2023-01-30 DIAGNOSIS — Z12.31 ENCOUNTER FOR SCREENING MAMMOGRAM FOR MALIGNANT NEOPLASM OF BREAST: ICD-10-CM

## 2023-01-30 DIAGNOSIS — Z79.4 TYPE 2 DIABETES MELLITUS WITH DIABETIC POLYNEUROPATHY, WITH LONG-TERM CURRENT USE OF INSULIN (HCC): ICD-10-CM

## 2023-01-30 DIAGNOSIS — I10 ESSENTIAL HYPERTENSION: ICD-10-CM

## 2023-01-30 DIAGNOSIS — E11.42 TYPE 2 DIABETES MELLITUS WITH DIABETIC POLYNEUROPATHY, WITH LONG-TERM CURRENT USE OF INSULIN (HCC): Primary | ICD-10-CM

## 2023-01-30 DIAGNOSIS — E11.42 TYPE 2 DIABETES MELLITUS WITH DIABETIC POLYNEUROPATHY, WITH LONG-TERM CURRENT USE OF INSULIN (HCC): ICD-10-CM

## 2023-01-30 LAB
ALBUMIN SERPL-MCNC: 3.9 G/DL (ref 3.4–5)
ALBUMIN/GLOB SERPL: 1.1 (ref 0.8–1.7)
ALP SERPL-CCNC: 90 U/L (ref 45–117)
ALT SERPL-CCNC: 32 U/L (ref 13–56)
ANION GAP SERPL CALC-SCNC: 2 MMOL/L (ref 3–18)
AST SERPL-CCNC: 15 U/L (ref 10–38)
BASOPHILS # BLD: 0.1 K/UL (ref 0–0.1)
BASOPHILS NFR BLD: 1 % (ref 0–2)
BILIRUB SERPL-MCNC: 0.7 MG/DL (ref 0.2–1)
BUN SERPL-MCNC: 13 MG/DL (ref 7–18)
BUN/CREAT SERPL: 12 (ref 12–20)
CALCIUM SERPL-MCNC: 10 MG/DL (ref 8.5–10.1)
CHLORIDE SERPL-SCNC: 104 MMOL/L (ref 100–111)
CO2 SERPL-SCNC: 34 MMOL/L (ref 21–32)
CREAT SERPL-MCNC: 1.07 MG/DL (ref 0.6–1.3)
CREAT UR-MCNC: 277 MG/DL (ref 30–125)
DIFFERENTIAL METHOD BLD: NORMAL
EOSINOPHIL # BLD: 0.2 K/UL (ref 0–0.4)
EOSINOPHIL NFR BLD: 2 % (ref 0–5)
ERYTHROCYTE [DISTWIDTH] IN BLOOD BY AUTOMATED COUNT: 12.5 % (ref 11.6–14.5)
GLOBULIN SER CALC-MCNC: 3.4 G/DL (ref 2–4)
GLUCOSE SERPL-MCNC: 111 MG/DL (ref 74–99)
HBA1C MFR BLD: 6.9 % (ref 4.2–5.6)
HCT VFR BLD AUTO: 41.9 % (ref 35–45)
HGB BLD-MCNC: 13.5 G/DL (ref 12–16)
IMM GRANULOCYTES # BLD AUTO: 0 K/UL (ref 0–0.04)
IMM GRANULOCYTES NFR BLD AUTO: 0 % (ref 0–0.5)
LYMPHOCYTES # BLD: 3.6 K/UL (ref 0.9–3.6)
LYMPHOCYTES NFR BLD: 34 % (ref 21–52)
MCH RBC QN AUTO: 29.9 PG (ref 24–34)
MCHC RBC AUTO-ENTMCNC: 32.2 G/DL (ref 31–37)
MCV RBC AUTO: 92.7 FL (ref 78–100)
MICROALBUMIN UR-MCNC: 1.89 MG/DL (ref 0–3)
MICROALBUMIN/CREAT UR-RTO: 7 MG/G (ref 0–30)
MONOCYTES # BLD: 0.8 K/UL (ref 0.05–1.2)
MONOCYTES NFR BLD: 7 % (ref 3–10)
NEUTS SEG # BLD: 5.8 K/UL (ref 1.8–8)
NEUTS SEG NFR BLD: 55 % (ref 40–73)
NRBC # BLD: 0 K/UL (ref 0–0.01)
NRBC BLD-RTO: 0 PER 100 WBC
PLATELET # BLD AUTO: 414 K/UL (ref 135–420)
PMV BLD AUTO: 9.9 FL (ref 9.2–11.8)
POTASSIUM SERPL-SCNC: 4.2 MMOL/L (ref 3.5–5.5)
PROT SERPL-MCNC: 7.3 G/DL (ref 6.4–8.2)
RBC # BLD AUTO: 4.52 M/UL (ref 4.2–5.3)
SODIUM SERPL-SCNC: 140 MMOL/L (ref 136–145)
WBC # BLD AUTO: 10.5 K/UL (ref 4.6–13.2)

## 2023-01-30 PROCEDURE — G8399 PT W/DXA RESULTS DOCUMENT: HCPCS | Performed by: LEGAL MEDICINE

## 2023-01-30 PROCEDURE — 83036 HEMOGLOBIN GLYCOSYLATED A1C: CPT

## 2023-01-30 PROCEDURE — G8536 NO DOC ELDER MAL SCRN: HCPCS | Performed by: LEGAL MEDICINE

## 2023-01-30 PROCEDURE — 3075F SYST BP GE 130 - 139MM HG: CPT | Performed by: LEGAL MEDICINE

## 2023-01-30 PROCEDURE — 1090F PRES/ABSN URINE INCON ASSESS: CPT | Performed by: LEGAL MEDICINE

## 2023-01-30 PROCEDURE — 80053 COMPREHEN METABOLIC PANEL: CPT

## 2023-01-30 PROCEDURE — 82043 UR ALBUMIN QUANTITATIVE: CPT

## 2023-01-30 PROCEDURE — 1123F ACP DISCUSS/DSCN MKR DOCD: CPT | Performed by: LEGAL MEDICINE

## 2023-01-30 PROCEDURE — 2022F DILAT RTA XM EVC RTNOPTHY: CPT | Performed by: LEGAL MEDICINE

## 2023-01-30 PROCEDURE — 36415 COLL VENOUS BLD VENIPUNCTURE: CPT

## 2023-01-30 PROCEDURE — 99214 OFFICE O/P EST MOD 30 MIN: CPT | Performed by: LEGAL MEDICINE

## 2023-01-30 PROCEDURE — 3044F HG A1C LEVEL LT 7.0%: CPT | Performed by: LEGAL MEDICINE

## 2023-01-30 PROCEDURE — 3078F DIAST BP <80 MM HG: CPT | Performed by: LEGAL MEDICINE

## 2023-01-30 PROCEDURE — 1101F PT FALLS ASSESS-DOCD LE1/YR: CPT | Performed by: LEGAL MEDICINE

## 2023-01-30 PROCEDURE — 85025 COMPLETE CBC W/AUTO DIFF WBC: CPT

## 2023-01-30 PROCEDURE — G9899 SCRN MAM PERF RSLTS DOC: HCPCS | Performed by: LEGAL MEDICINE

## 2023-01-30 PROCEDURE — G8427 DOCREV CUR MEDS BY ELIG CLIN: HCPCS | Performed by: LEGAL MEDICINE

## 2023-01-30 PROCEDURE — G8417 CALC BMI ABV UP PARAM F/U: HCPCS | Performed by: LEGAL MEDICINE

## 2023-01-30 PROCEDURE — 3017F COLORECTAL CA SCREEN DOC REV: CPT | Performed by: LEGAL MEDICINE

## 2023-01-30 PROCEDURE — G9717 DOC PT DX DEP/BP F/U NT REQ: HCPCS | Performed by: LEGAL MEDICINE

## 2023-01-30 RX ORDER — SIMVASTATIN 10 MG/1
10 TABLET, FILM COATED ORAL
Qty: 90 TABLET | Refills: 1 | Status: SHIPPED | OUTPATIENT
Start: 2023-01-30

## 2023-01-30 NOTE — PROGRESS NOTES
Horacoi Jasso is a 74 y.o. female (: 1955) presenting to address:    Chief Complaint   Patient presents with    Diabetes     Follow up       Vitals:    23 1405   BP: 130/78   Pulse: 100   Resp: 14   Temp: 98.2 °F (36.8 °C)   TempSrc: Temporal   SpO2: 97%   Weight: 203 lb (92.1 kg)   Height: 5' 5\" (1.651 m)   PainSc:   0 - No pain       Hearing/Vision:   No results found. Learning Assessment:     Learning Assessment 3/10/2014   PRIMARY LEARNER Patient   PRIMARY LANGUAGE ENGLISH   LEARNER PREFERENCE PRIMARY OTHER (COMMENT)   ANSWERED BY patient   RELATIONSHIP SELF     Depression Screening:     3 most recent PHQ Screens 2023   PHQ Not Done -   Little interest or pleasure in doing things Not at all   Feeling down, depressed, irritable, or hopeless Not at all   Total Score PHQ 2 0     Fall Risk Assessment:     Fall Risk Assessment, last 12 mths 2023   Able to walk? Yes   Fall in past 12 months? 0   Do you feel unsteady? 0   Are you worried about falling 0     Abuse Screening:     Abuse Screening Questionnaire 10/5/2022   Do you ever feel afraid of your partner? N   Are you in a relationship with someone who physically or mentally threatens you? N   Is it safe for you to go home?  Y     ADL Assessment:     ADL Assessment 2019   Feeding yourself No Help Needed   Getting from bed to chair No Help Needed   Getting dressed No Help Needed   Bathing or showering No Help Needed   Walk across the room (includes cane/walker) No Help Needed   Using the telphone No Help Needed   Taking your medications No Help Needed   Preparing meals No Help Needed   Managing money (expenses/bills) No Help Needed   Moderately strenuous housework (laundry) No Help Needed   Shopping for personal items (toiletries/medicines) No Help Needed   Shopping for groceries No Help Needed   Driving No Help Needed   Climbing a flight of stairs No Help Needed   Getting to places beyond walking distances No Help Needed Coordination of Care Questionaire:   1. \"Have you been to the ER, urgent care clinic since your last visit? Hospitalized since your last visit? \" No    2. \"Have you seen or consulted any other health care providers outside of the [address] [place] [address] since your last visit? \" No     3. For patients aged 39-70: Has the patient had a colonoscopy? No     If the patient is female:    4. For patients aged 41-77: Has the patient had a mammogram within the past 2 years? No    5. For patients aged 21-65: Has the patient had a pap smear? No    Advanced Directive:   1. Do you have an Advanced Directive? NO    2. Would you like information on Advanced Directives?  NO

## 2023-01-30 NOTE — PROGRESS NOTES
Becki Yanez Imperial     Chief Complaint   Patient presents with    Diabetes     Follow up     Vitals:    01/30/23 1405   BP: 130/78   Pulse: 100   Resp: 14   Temp: 98.2 °F (36.8 °C)   TempSrc: Temporal   SpO2: 97%   Weight: 203 lb (92.1 kg)   Height: 5' 5\" (1.651 m)   PainSc:   0 - No pain         HPI:Concepcion Gilmore is here for follow up on diabetes hypertension and hyperlipidemia  She is due for blood work , and also requesting medication refill    Scheduled for eye exam at [place] [facility]   Declined flu vaccine  Declined shingles vaccine   Still smoking cigarette   down  to 1/2 back a day       Had colonoscopy done last year  Diony Linn MD to get results       Past Medical History:   Diagnosis Date    Cataract     CVA, old, cognitive deficits     Depression 1/13/2015    Diabetes ([address]     Diabetic neuropathy ([address] 9/1/2013    Diabetic retinopathy ([address] 3/2/2012    DM (diabetes mellitus) ([address] 3/2/2012    GERD (gastroesophageal reflux disease) 4/21/2011    Glaucoma     Heel spur 11/24/2014    HTN (hypertension) 4/21/2011    Hyperlipidemia 3/2/2012    Insomnia 4/21/2011    Kidney stone     Obesity (BMI 30-39. 9) 7/16/2018    Retained ureteral stent     Schizophrenia ([address] 4/21/2011    Sleep apnea, obstructive 9/1/2013    Stroke ([address] 08/2018    pt states her Neurologist diagnosed her with stroke in August    Urinary frequency      Past Surgical History:   Procedure Laterality Date    COLONOSCOPY N/A 9/2/2022    DIAGNOSTIC COLONOSCOPY  with hot snare polypectomy performed by Colleen Martell MD at [facility] ENDOSCOPY    HX GYN      BTL    HX OPEN CHOLECYSTECTOMY      HX UROLOGICAL Left 09/2017    Cystoscopy, Left retrograde pyelography, Left ureteroscopic stone extraction with laser lithotripsy and basket stone extraction,Left 6 x 24 cm double-J ureteral stent placement, Interpretation of urography.   [address] - Dr Ziyad Keller    left benign     Social History     Tobacco Use    Smoking status: Every Day     Packs/day: 0.50     Years: 21.00     Pack years: 10.50     Types: Cigarettes    Smokeless tobacco: Former   Substance Use Topics    Alcohol use: No       Family History   Family history unknown: Yes       Review of Systems   Constitutional:  Negative for chills, fever, malaise/fatigue and weight loss. HENT:  Negative for congestion, ear discharge, ear pain, hearing loss, nosebleeds, sinus pain and sore throat. Eyes:  Negative for blurred vision, double vision and discharge. Respiratory:  Negative for cough, hemoptysis, sputum production, shortness of breath, wheezing and stridor. Cardiovascular:  Negative for chest pain, palpitations, claudication and leg swelling. Gastrointestinal:  Negative for abdominal pain, constipation, diarrhea, nausea and vomiting. Genitourinary:  Negative for dysuria, frequency and urgency. Musculoskeletal:  Negative for myalgias. Skin:  Negative for itching and rash. Skin lesions ?/acne    Neurological:  Negative for dizziness, tingling, sensory change, speech change, focal weakness, weakness and headaches. Psychiatric/Behavioral:  Negative for depression and suicidal ideas. Physical Exam  Vitals and nursing note reviewed. Constitutional:       General: She is not in acute distress. Appearance: She is well-developed. She is not diaphoretic. HENT:      Head: Normocephalic and atraumatic. Mouth/Throat:      Pharynx: No oropharyngeal exudate. Eyes:      General: No scleral icterus. Right eye: No discharge. Left eye: No discharge. Conjunctiva/sclera: Conjunctivae normal.      Pupils: Pupils are equal, round, and reactive to light. Neck:      Thyroid: No thyromegaly. Cardiovascular:      Rate and Rhythm: Normal rate and regular rhythm. Heart sounds: Normal heart sounds. No murmur heard. Pulmonary:      Effort: Pulmonary effort is normal. No respiratory distress.       Breath sounds: Normal breath sounds. No wheezing or rales. Chest:      Chest wall: No tenderness. Abdominal:      General: There is no distension. Palpations: Abdomen is soft. Tenderness: There is no abdominal tenderness. There is no rebound. Musculoskeletal:         General: No tenderness or deformity. Normal range of motion. Lymphadenopathy:      Cervical: No cervical adenopathy. Skin:     General: Skin is warm and dry. Coloration: Skin is not pale. Findings: No erythema or rash. Comments: Multiple face lesions possible acne   Neurological:      Mental Status: She is alert and oriented to person, place, and time. Cranial Nerves: No cranial nerve deficit. Coordination: Coordination normal.   Psychiatric:         Behavior: Behavior normal.         Thought Content: Thought content normal.         Judgment: Judgment normal.        Assessment and plan     Plan of care has been discussed with the patient, he agrees to the plan and verbalized understanding. All his questions were answered  More than 50% of the time spent in this visit was counseling the patient about  illness and treatment options         1. Type 2 diabetes mellitus with diabetic polyneuropathy, with long-term current use of insulin (HCC)  Well-controlled last hemoglobin A1c 6.9   - MICROALBUMIN, UR, RAND W/ MICROALB/CREAT RATIO; Future  - HEMOGLOBIN A1C W/O EAG; Future  - METABOLIC PANEL, COMPREHENSIVE; Future  - CBC WITH AUTOMATED DIFF; Future    2. Schizophrenia, unspecified type ([facility]  symptoms has been stable she is not on any medications 3. Stage 3a chronic kidney disease (HCC)  Avoid all NSAIDsstable kidney function    - METABOLIC PANEL, COMPREHENSIVE; Future  - CBC WITH AUTOMATED DIFF; Future    4. Mixed hyperlipidemia    - CBC WITH AUTOMATED DIFF; Future  - simvastatin (ZOCOR) 10 mg tablet; Take 1 Tablet by mouth nightly. Dispense: 90 Tablet; Refill: 1    5.  Essential hypertension  Blood pressures well controlled  - CBC WITH AUTOMATED DIFF; Future    6. Encounter for screening mammogram for malignant neoplasm of breast    - [facility] MAMMO BI SCREENING INCL CAD; Future    7. Other acne    - REFERRAL TO DERMATOLOGY    Current Outpatient Medications   Medication Sig Dispense Refill    simvastatin (ZOCOR) 10 mg tablet Take 1 Tablet by mouth nightly. 90 Tablet 1    glipiZIDE SR (GLUCOTROL XL) 10 mg CR tablet TAKE 1 TABLET BY MOUTH TWICE DAILY 180 Tablet 0    doxepin (SINEquan) 25 mg capsule TAKE 1 CAPSULE BY MOUTH EVERY NIGHT 30 Capsule 2    amLODIPine (NORVASC) 10 mg tablet TAKE 1 TABLET BY MOUTH EVERY DAY 90 Tablet 1    Insulin Needles, Disposable, (Rosalva Pen Needle) 32 gauge x 5/32\" ndle AS DIRECTED ONCE A  Pen Needle 1    insulin glargine (Lantus Solostar U-100 Insulin) 100 unit/mL (3 mL) inpn ADMINISTER 55 UNITS UNDER THE SKIN AT BEDTIME 15 mL 3    aspirin delayed-release 81 mg tablet Take  by mouth daily. pantoprazole (PROTONIX) 40 mg tablet Take 1 Tablet by mouth in the morning. 90 Tablet 1    bimatoprost (Lumigan) 0.01 % ophthalmic drops Administer 1 Drop to both eyes nightly. multivitamin (ONE A DAY) tablet Take 1 Tab by mouth daily. ALCOHOL PREP PADS padm USE TWICE DAILY AS DIRECTED 200 Pad 1    Ferrous Sulfate (SLOW FE) 47.5 mg iron TbER tablet Take 1 Tab by mouth daily. 90 Tab 1    Blood-Glucose Meter (CONTOUR METER) monitoring kit DX E11.9 1 Kit 0       Patient Active Problem List    Diagnosis Date Noted    Chronic renal disease, stage III 06/20/2022    CVA, old, cognitive deficits     Obesity (BMI 30-39.9) 07/16/2018    Severe obesity (BMI 35.0-39. 9) with comorbidity ([facility] [address] 05/07/2018    Type 2 diabetes with nephropathy ([facility] 03/05/2018    Depression 01/13/2015    Heel spur 11/24/2014    Sleep apnea, obstructive 09/01/2013    Diabetic neuropathy ([facility] 09/01/2013    DM (diabetes mellitus) ([facility] 03/02/2012    Hyperlipidemia 03/02/2012    Diabetic retinopathy ([facility] 03/02/2012    Cataract Glaucoma     HTN (hypertension) 04/21/2011    Insomnia 04/21/2011    GERD (gastroesophageal reflux disease) 04/21/2011    Schizophrenia ([facility] 04/21/2011     Results for orders placed or performed during the hospital encounter of 09/13/22   CBC WITH AUTOMATED DIFF   Result Value Ref Range    WBC 12.3 4.6 - 13.2 K/uL    RBC 4.62 4.20 - 5.30 M/uL    HGB 13.6 12.0 - 16.0 g/dL    HCT 42.3 35.0 - 45.0 %    MCV 91.6 78.0 - 100.0 FL    MCH 29.4 24.0 - 34.0 PG    MCHC 32.2 31.0 - 37.0 g/dL    RDW 13.0 11.6 - 14.5 %    PLATELET 590 (H) 583 - 420 K/uL    MPV 10.4 9.2 - 11.8 FL    NRBC 0.0 0  WBC    ABSOLUTE NRBC 0.00 0.00 - 0.01 K/uL    NEUTROPHILS 70 40 - 73 %    LYMPHOCYTES 22 21 - 52 %    MONOCYTES 6 3 - 10 %    EOSINOPHILS 1 0 - 5 %    BASOPHILS 1 0 - 2 %    IMMATURE GRANULOCYTES 0 0.0 - 0.5 %    ABS. NEUTROPHILS 8.6 (H) 1.8 - 8.0 K/UL    ABS. LYMPHOCYTES 2.7 0.9 - 3.6 K/UL    ABS. MONOCYTES 0.8 0.05 - 1.2 K/UL    ABS. EOSINOPHILS 0.1 0.0 - 0.4 K/UL    ABS. BASOPHILS 0.1 0.0 - 0.1 K/UL    ABS. IMM. GRANS. 0.0 0.00 - 0.04 K/UL    DF AUTOMATED     METABOLIC PANEL, COMPREHENSIVE   Result Value Ref Range    Sodium 141 136 - 145 mmol/L    Potassium 4.3 3.5 - 5.5 mmol/L    Chloride 106 100 - 111 mmol/L    CO2 28 21 - 32 mmol/L    Anion gap 7 3.0 - 18 mmol/L    Glucose 63 (L) 74 - 99 mg/dL    BUN 13 7.0 - 18 MG/DL    Creatinine 1.07 0.6 - 1.3 MG/DL    BUN/Creatinine ratio 12 12 - 20      GFR est AA >60 >60 ml/min/1.73m2    GFR est non-AA 51 (L) >60 ml/min/1.73m2    Calcium 9.7 8.5 - 10.1 MG/DL    Bilirubin, total 0.5 0.2 - 1.0 MG/DL    ALT (SGPT) 27 13 - 56 U/L    AST (SGOT) 18 10 - 38 U/L    Alk.  phosphatase 91 45 - 117 U/L    Protein, total 7.3 6.4 - 8.2 g/dL    Albumin 3.9 3.4 - 5.0 g/dL    Globulin 3.4 2.0 - 4.0 g/dL    A-G Ratio 1.1 0.8 - 1.7     LIPID PANEL   Result Value Ref Range    LIPID PROFILE          Cholesterol, total 147 <200 MG/DL    Triglyceride 123 <150 MG/DL    HDL Cholesterol 57 40 - 60 MG/DL    LDL, calculated 65.4 0 - 100 MG/DL    VLDL, calculated 24.6 MG/DL    CHOL/HDL Ratio 2.6 0 - 5.0     HEMOGLOBIN A1C W/O EAG   Result Value Ref Range    Hemoglobin A1c 6.8 (H) 4.2 - 5.6 %   VITAMIN D, 25 HYDROXY   Result Value Ref Range    Vitamin D 25-Hydroxy 105.3 (H) 30 - 100 ng/mL     Hospital Outpatient Visit on 01/30/2023   Component Date Value Ref Range Status    Microalbumin,urine random 01/30/2023 1.89  0 - 3.0 MG/DL Final    Creatinine, urine random 01/30/2023 277.00 (A)  30 - 125 mg/dL Final    Microalbumin/Creat ratio (mg/g cre* 01/30/2023 7  0 - 30 mg/g Final    Hemoglobin A1c 01/30/2023 6.9 (A)  4.2 - 5.6 % Final    Comment: (NOTE)  HbA1C Interpretive Ranges  <5.7              Normal  5.7 - 6.4         Consider Prediabetes  >6.5              Consider Diabetes      Sodium 01/30/2023 140  136 - 145 mmol/L Final    Potassium 01/30/2023 4.2  3.5 - 5.5 mmol/L Final    Chloride 01/30/2023 104  100 - 111 mmol/L Final    CO2 01/30/2023 34 (A)  21 - 32 mmol/L Final    Anion gap 01/30/2023 2 (A)  3.0 - 18 mmol/L Final    Glucose 01/30/2023 111 (A)  74 - 99 mg/dL Final    BUN 01/30/2023 13  7.0 - 18 MG/DL Final    Creatinine 01/30/2023 1.07  0.6 - 1.3 MG/DL Final    BUN/Creatinine ratio 01/30/2023 12  12 - 20   Final    eGFR 01/30/2023 58 (A)  >60 ml/min/1.73m2 Final    Comment:      Pediatric calculator link: [facility].at. org/professionals/kdoqi/gfr_calculatorped       These results are not intended for use in patients <25years of age. eGFR results are calculated without a race factor using  the 2021 CKD-EPI equation. Careful clinical correlation is recommended, particularly when comparing to results calculated using previous equations. The CKD-EPI equation is less accurate in patients with extremes of muscle mass, extra-renal metabolism of creatinine, excessive creatine ingestion, or following therapy that affects renal tubular secretion.       Calcium 01/30/2023 10.0  8.5 - 10.1 MG/DL Final    Bilirubin, total 01/30/2023 0.7  0.2 - 1.0 MG/DL Final    ALT (SGPT) 01/30/2023 32  13 - 56 U/L Final    AST (SGOT) 01/30/2023 15  10 - 38 U/L Final    Alk. phosphatase 01/30/2023 90  45 - 117 U/L Final    Protein, total 01/30/2023 7.3  6.4 - 8.2 g/dL Final    Albumin 01/30/2023 3.9  3.4 - 5.0 g/dL Final    Globulin 01/30/2023 3.4  2.0 - 4.0 g/dL Final    A-G Ratio 01/30/2023 1.1  0.8 - 1.7   Final    WBC 01/30/2023 10.5  4.6 - 13.2 K/uL Final    RBC 01/30/2023 4.52  4.20 - 5.30 M/uL Final    HGB 01/30/2023 13.5  12.0 - 16.0 g/dL Final    HCT 01/30/2023 41.9  35.0 - 45.0 % Final    MCV 01/30/2023 92.7  78.0 - 100.0 FL Final    MCH 01/30/2023 29.9  24.0 - 34.0 PG Final    MCHC 01/30/2023 32.2  31.0 - 37.0 g/dL Final    RDW 01/30/2023 12.5  11.6 - 14.5 % Final    PLATELET 77/92/8892 066  135 - 420 K/uL Final    MPV 01/30/2023 9.9  9.2 - 11.8 FL Final    NRBC 01/30/2023 0.0  0  WBC Final    ABSOLUTE NRBC 01/30/2023 0.00  0.00 - 0.01 K/uL Final    NEUTROPHILS 01/30/2023 55  40 - 73 % Final    LYMPHOCYTES 01/30/2023 34  21 - 52 % Final    MONOCYTES 01/30/2023 7  3 - 10 % Final    EOSINOPHILS 01/30/2023 2  0 - 5 % Final    BASOPHILS 01/30/2023 1  0 - 2 % Final    IMMATURE GRANULOCYTES 01/30/2023 0  0.0 - 0.5 % Final    ABS. NEUTROPHILS 01/30/2023 5.8  1.8 - 8.0 K/UL Final    ABS. LYMPHOCYTES 01/30/2023 3.6  0.9 - 3.6 K/UL Final    ABS. MONOCYTES 01/30/2023 0.8  0.05 - 1.2 K/UL Final    ABS. EOSINOPHILS 01/30/2023 0.2  0.0 - 0.4 K/UL Final    ABS. BASOPHILS 01/30/2023 0.1  0.0 - 0.1 K/UL Final    ABS. IMM.  GRANS. 01/30/2023 0.0  0.00 - 0.04 K/UL Final    DF 01/30/2023 AUTOMATED    Final

## 2023-02-02 NOTE — PROGRESS NOTES
Diabetes well controlled hemoglobin A1c 6.9  Urine test for microalbumin creatinine ratio is normal  Normal sodium, potassium, chloride and calcium  normal liver function  Normal hemoglobin and normal white cell count  very early stage III kidney disease advised patient to avoid all NSAIDs ibuprofen, Motrin, naproxen, Voltaren she can only to take Tylenol for headaches and pain  Reevaluate kidney function and diabetes in 3 months

## 2023-02-06 ENCOUNTER — TELEPHONE (OUTPATIENT)
Dept: FAMILY MEDICINE CLINIC | Age: 68
End: 2023-02-06

## 2023-02-06 NOTE — TELEPHONE ENCOUNTER
Patient was informed of lab results and treatment plan, Per PCP \"very early stage III kidney disease advised patient to avoid all NSAIDs ibuprofen, Motrin, naproxen, Voltaren she can only to take Tylenol for headaches and pain  Reevaluate kidney function and diabetes in 3 months\"      Patient wanted to know if any of her medications is causing this and if PCP is going to discontinue any medications due to her diagnosis.

## 2023-02-07 ENCOUNTER — TELEPHONE (OUTPATIENT)
Dept: FAMILY MEDICINE CLINIC | Age: 68
End: 2023-02-07

## 2023-02-07 DIAGNOSIS — Z79.4 TYPE 2 DIABETES MELLITUS WITH DIABETIC POLYNEUROPATHY, WITH LONG-TERM CURRENT USE OF INSULIN (HCC): ICD-10-CM

## 2023-02-07 DIAGNOSIS — N18.31 STAGE 3A CHRONIC KIDNEY DISEASE (HCC): Primary | ICD-10-CM

## 2023-02-07 DIAGNOSIS — E11.42 TYPE 2 DIABETES MELLITUS WITH DIABETIC POLYNEUROPATHY, WITH LONG-TERM CURRENT USE OF INSULIN (HCC): ICD-10-CM

## 2023-02-07 NOTE — TELEPHONE ENCOUNTER
Pt stated that she would like to know if Dr Noriega would like to take her off some of her medications due to her having early stage kidney disease. Please advise

## 2023-02-07 NOTE — TELEPHONE ENCOUNTER
Pt stated that she would like to know if Dr Deonna Frias would like to take her off some of her medications due to her having early stage kidney disease.  Please advise

## 2023-02-08 NOTE — TELEPHONE ENCOUNTER
Spoke w/ pt to inform. Patient stated that she has been experiencing left arm pain and right leg pain when bending, patient denied any swelling, she has been taking tylenol for pain, but it has not been helping. After consulting with BEN, Ganesh Rodriguez patient was offered an appt with another provider because PCP is booked, patient stated that she do not want to see another provider because she do want to explain her medical history to another provider when PCP know her history patient stated that she will wait to see PCP until he next available even if it is 2-3 weeks out.

## 2023-02-15 ENCOUNTER — TELEPHONE (OUTPATIENT)
Dept: WOMENS IMAGING | Facility: HOSPITAL | Age: 68
End: 2023-02-15

## 2023-02-23 DIAGNOSIS — Z79.4 TYPE 2 DIABETES MELLITUS WITH DIABETIC POLYNEUROPATHY, WITH LONG-TERM CURRENT USE OF INSULIN (HCC): ICD-10-CM

## 2023-02-23 DIAGNOSIS — N18.31 STAGE 3A CHRONIC KIDNEY DISEASE (HCC): Primary | ICD-10-CM

## 2023-02-23 DIAGNOSIS — Z79.4 TYPE 2 DIABETES MELLITUS WITH DIABETIC POLYNEUROPATHY, WITH LONG-TERM CURRENT USE OF INSULIN (HCC): Primary | ICD-10-CM

## 2023-02-23 DIAGNOSIS — E11.42 TYPE 2 DIABETES MELLITUS WITH DIABETIC POLYNEUROPATHY, WITH LONG-TERM CURRENT USE OF INSULIN (HCC): ICD-10-CM

## 2023-02-23 DIAGNOSIS — E11.42 TYPE 2 DIABETES MELLITUS WITH DIABETIC POLYNEUROPATHY, WITH LONG-TERM CURRENT USE OF INSULIN (HCC): Primary | ICD-10-CM

## 2023-02-28 ENCOUNTER — OFFICE VISIT (OUTPATIENT)
Dept: FAMILY MEDICINE CLINIC | Facility: CLINIC | Age: 68
End: 2023-02-28
Payer: MEDICARE

## 2023-02-28 VITALS
TEMPERATURE: 98.1 F | SYSTOLIC BLOOD PRESSURE: 130 MMHG | DIASTOLIC BLOOD PRESSURE: 76 MMHG | BODY MASS INDEX: 33.72 KG/M2 | RESPIRATION RATE: 15 BRPM | HEIGHT: 65 IN | HEART RATE: 109 BPM | OXYGEN SATURATION: 96 % | WEIGHT: 202.4 LBS

## 2023-02-28 DIAGNOSIS — Z91.81 AT HIGH RISK FOR FALLS: ICD-10-CM

## 2023-02-28 DIAGNOSIS — Z12.31 ENCOUNTER FOR SCREENING MAMMOGRAM FOR MALIGNANT NEOPLASM OF BREAST: ICD-10-CM

## 2023-02-28 DIAGNOSIS — E11.42 TYPE 2 DIABETES MELLITUS WITH DIABETIC POLYNEUROPATHY, WITHOUT LONG-TERM CURRENT USE OF INSULIN (HCC): Primary | ICD-10-CM

## 2023-02-28 DIAGNOSIS — M17.11 PRIMARY OSTEOARTHRITIS OF RIGHT KNEE: ICD-10-CM

## 2023-02-28 DIAGNOSIS — M25.561 ACUTE PAIN OF RIGHT KNEE: ICD-10-CM

## 2023-02-28 DIAGNOSIS — L70.8 OTHER ACNE: ICD-10-CM

## 2023-02-28 PROCEDURE — 3044F HG A1C LEVEL LT 7.0%: CPT | Performed by: LEGAL MEDICINE

## 2023-02-28 PROCEDURE — 3078F DIAST BP <80 MM HG: CPT | Performed by: LEGAL MEDICINE

## 2023-02-28 PROCEDURE — G8484 FLU IMMUNIZE NO ADMIN: HCPCS | Performed by: LEGAL MEDICINE

## 2023-02-28 PROCEDURE — 3017F COLORECTAL CA SCREEN DOC REV: CPT | Performed by: LEGAL MEDICINE

## 2023-02-28 PROCEDURE — 99214 OFFICE O/P EST MOD 30 MIN: CPT | Performed by: LEGAL MEDICINE

## 2023-02-28 PROCEDURE — 4004F PT TOBACCO SCREEN RCVD TLK: CPT | Performed by: LEGAL MEDICINE

## 2023-02-28 PROCEDURE — 2022F DILAT RTA XM EVC RTNOPTHY: CPT | Performed by: LEGAL MEDICINE

## 2023-02-28 PROCEDURE — G8427 DOCREV CUR MEDS BY ELIG CLIN: HCPCS | Performed by: LEGAL MEDICINE

## 2023-02-28 PROCEDURE — G8417 CALC BMI ABV UP PARAM F/U: HCPCS | Performed by: LEGAL MEDICINE

## 2023-02-28 PROCEDURE — G8399 PT W/DXA RESULTS DOCUMENT: HCPCS | Performed by: LEGAL MEDICINE

## 2023-02-28 PROCEDURE — 1123F ACP DISCUSS/DSCN MKR DOCD: CPT | Performed by: LEGAL MEDICINE

## 2023-02-28 PROCEDURE — 1090F PRES/ABSN URINE INCON ASSESS: CPT | Performed by: LEGAL MEDICINE

## 2023-02-28 PROCEDURE — 3075F SYST BP GE 130 - 139MM HG: CPT | Performed by: LEGAL MEDICINE

## 2023-02-28 RX ORDER — SENNOSIDES 8.6 MG
1300 CAPSULE ORAL AS NEEDED
COMMUNITY
Start: 2023-02-23

## 2023-02-28 ASSESSMENT — ENCOUNTER SYMPTOMS
APNEA: 0
EYE PAIN: 0
COUGH: 0
WHEEZING: 0
CHEST TIGHTNESS: 0
FACIAL SWELLING: 0
CHOKING: 0
EYE DISCHARGE: 0
BACK PAIN: 0
RECTAL PAIN: 0
ABDOMINAL PAIN: 0
SHORTNESS OF BREATH: 0
DIARRHEA: 0
CONSTIPATION: 0
SORE THROAT: 0
NAUSEA: 0
BLOOD IN STOOL: 0
VOMITING: 0
EYE ITCHING: 0
EYE REDNESS: 0
ANAL BLEEDING: 0

## 2023-02-28 ASSESSMENT — PATIENT HEALTH QUESTIONNAIRE - PHQ9
SUM OF ALL RESPONSES TO PHQ QUESTIONS 1-9: 0
SUM OF ALL RESPONSES TO PHQ QUESTIONS 1-9: 0
5. POOR APPETITE OR OVEREATING: 0
7. TROUBLE CONCENTRATING ON THINGS, SUCH AS READING THE NEWSPAPER OR WATCHING TELEVISION: 0
SUM OF ALL RESPONSES TO PHQ9 QUESTIONS 1 & 2: 0
9. THOUGHTS THAT YOU WOULD BE BETTER OFF DEAD, OR OF HURTING YOURSELF: 0
5. POOR APPETITE OR OVEREATING: 0
SUM OF ALL RESPONSES TO PHQ QUESTIONS 1-9: 0
8. MOVING OR SPEAKING SO SLOWLY THAT OTHER PEOPLE COULD HAVE NOTICED. OR THE OPPOSITE, BEING SO FIGETY OR RESTLESS THAT YOU HAVE BEEN MOVING AROUND A LOT MORE THAN USUAL: 0
10. IF YOU CHECKED OFF ANY PROBLEMS, HOW DIFFICULT HAVE THESE PROBLEMS MADE IT FOR YOU TO DO YOUR WORK, TAKE CARE OF THINGS AT HOME, OR GET ALONG WITH OTHER PEOPLE: 0
SUM OF ALL RESPONSES TO PHQ QUESTIONS 1-9: 0
SUM OF ALL RESPONSES TO PHQ QUESTIONS 1-9: 0
8. MOVING OR SPEAKING SO SLOWLY THAT OTHER PEOPLE COULD HAVE NOTICED. OR THE OPPOSITE, BEING SO FIGETY OR RESTLESS THAT YOU HAVE BEEN MOVING AROUND A LOT MORE THAN USUAL: 0
4. FEELING TIRED OR HAVING LITTLE ENERGY: 0
2. FEELING DOWN, DEPRESSED OR HOPELESS: 0
9. THOUGHTS THAT YOU WOULD BE BETTER OFF DEAD, OR OF HURTING YOURSELF: 0
3. TROUBLE FALLING OR STAYING ASLEEP: 0
SUM OF ALL RESPONSES TO PHQ QUESTIONS 1-9: 0
3. TROUBLE FALLING OR STAYING ASLEEP: 0
4. FEELING TIRED OR HAVING LITTLE ENERGY: 0
1. LITTLE INTEREST OR PLEASURE IN DOING THINGS: 0
2. FEELING DOWN, DEPRESSED OR HOPELESS: 0
10. IF YOU CHECKED OFF ANY PROBLEMS, HOW DIFFICULT HAVE THESE PROBLEMS MADE IT FOR YOU TO DO YOUR WORK, TAKE CARE OF THINGS AT HOME, OR GET ALONG WITH OTHER PEOPLE: 0
SUM OF ALL RESPONSES TO PHQ QUESTIONS 1-9: 0
SUM OF ALL RESPONSES TO PHQ QUESTIONS 1-9: 0
7. TROUBLE CONCENTRATING ON THINGS, SUCH AS READING THE NEWSPAPER OR WATCHING TELEVISION: 0
6. FEELING BAD ABOUT YOURSELF - OR THAT YOU ARE A FAILURE OR HAVE LET YOURSELF OR YOUR FAMILY DOWN: 0
6. FEELING BAD ABOUT YOURSELF - OR THAT YOU ARE A FAILURE OR HAVE LET YOURSELF OR YOUR FAMILY DOWN: 0
SUM OF ALL RESPONSES TO PHQ9 QUESTIONS 1 & 2: 0
1. LITTLE INTEREST OR PLEASURE IN DOING THINGS: 0

## 2023-02-28 ASSESSMENT — ANXIETY QUESTIONNAIRES
7. FEELING AFRAID AS IF SOMETHING AWFUL MIGHT HAPPEN: 0
GAD7 TOTAL SCORE: 0
5. BEING SO RESTLESS THAT IT IS HARD TO SIT STILL: 0
3. WORRYING TOO MUCH ABOUT DIFFERENT THINGS: 0
4. TROUBLE RELAXING: 0
IF YOU CHECKED OFF ANY PROBLEMS ON THIS QUESTIONNAIRE, HOW DIFFICULT HAVE THESE PROBLEMS MADE IT FOR YOU TO DO YOUR WORK, TAKE CARE OF THINGS AT HOME, OR GET ALONG WITH OTHER PEOPLE: NOT DIFFICULT AT ALL
6. BECOMING EASILY ANNOYED OR IRRITABLE: 0
1. FEELING NERVOUS, ANXIOUS, OR ON EDGE: 0
2. NOT BEING ABLE TO STOP OR CONTROL WORRYING: 0

## 2023-02-28 NOTE — PROGRESS NOTES
Kole Vaughn is a 79 y.o. female (: 1955) presenting to address:    Chief Complaint   Patient presents with    Pain     Left arm pain/right leg pain; pt believes its the bed she sleeps on       Vitals:    23 1301   BP: 130/76   Pulse: (!) 109   Resp: 15   Temp: 98.1 °F (36.7 °C)   SpO2: 96%       Coordination of Care Questionaire:   1. \"Have you been to the ER, urgent care clinic since your last visit? Hospitalized since your last visit? \" Yes, [facility] for fall    2. \"Have you seen or consulted any other health care providers outside of the [address] [place] [address] since your last visit? \"      3. For patients aged 39-70: Has the patient had a colonoscopy / FIT/ Cologuard? No      If the patient is female:    4. For patients aged 41-77: Has the patient had a mammogram within the past 2 years? No      5. For patients aged 21-65: Has the patient had a pap smear? No    Advanced Directive:   1. Do you have an Advanced Directive? No    2. Would you like information on Advanced Directives?  No

## 2023-02-28 NOTE — PROGRESS NOTES
Geri Counts Aleshia     Chief Complaint   Patient presents with    Pain     Left arm pain/right leg pain; pt believes its the bed she sleeps on     /76 (Site: Left Upper Arm, Position: Sitting, Cuff Size: Medium Adult)   Pulse (!) 109   Temp 98.1 °F (36.7 °C) (Temporal)   Resp 15   Ht 5' 5\" (1.651 m)   Wt 202 lb 6.4 oz (91.8 kg)   SpO2 96%   BMI 33.68 kg/m²         HPI:  Daniel Salazar is here accompanied by her girl friend Nikkie complaining  about right leg pain she was playing with 4 years child she jumped  up high and now has pain in the back of her knee 4 weeks ago and has been having knee pain with no much swelling now was seen in the ER on 2/22  She is also complaining about left arm pain start up in the upper shoulder down her arm and she also feels a tingling sensation in her fingers      Taking tylenol as needed with no much help       Left arm pain tingling  sensation started last month no trauma or repetitive movement , has DDD of cervical spine documented by Xray previously   Declined shingle vaccine   Has scheduled appointment with eye doctors     Past Medical History:   Diagnosis Date    Cataract     CVA, old, cognitive deficits     Depression 1/13/2015    Diabetes ([address]     Diabetic neuropathy ([address] 9/1/2013    Diabetic retinopathy ([address] 3/2/2012    DM (diabetes mellitus) ([address] 3/2/2012    GERD (gastroesophageal reflux disease) 4/21/2011    Glaucoma     Heel spur 11/24/2014    HTN (hypertension) 4/21/2011    Hyperlipidemia 3/2/2012    Insomnia 4/21/2011    Kidney stone     Obesity (BMI 30-39. 9) 7/16/2018    Retained ureteral stent     Schizophrenia ([address] 4/21/2011    Sleep apnea, obstructive 9/1/2013    Stroke ([address] 08/2018    pt states her Neurologist diagnosed her with stroke in August    Urinary frequency      Past Surgical History:   Procedure Laterality Date    BIOPSY OF BREAST, INCISIONAL  1970    left benign    CHOLECYSTECTOMY, OPEN      COLONOSCOPY N/A 9/2/2022 DIAGNOSTIC COLONOSCOPY  with hot snare polypectomy performed by Molly Gayle MD at [facility] [address]    [facility] STEROTACTIC LOC BREAST BIOPSY RIGHT Right 1/29/2018    [facility] STEROTACTIC LOC BREAST BIOPSY RIGHT [facility] HISTORICAL    [facility] STEROTACTIC LOC BREAST BIOPSY RIGHT Right 1/29/2018    [facility] STEROTACTIC LOC BREAST BIOPSY RIGHT [facility] HISTORICAL    UROLOGICAL SURGERY Left 09/2017    Cystoscopy, Left retrograde pyelography, Left ureteroscopic stone extraction with laser lithotripsy and basket stone extraction,Left 6 x 24 cm double-J ureteral stent placement, Interpretation of urography. [address] - Dr Vineet Lawrence      Social History     Tobacco Use    Smoking status: Every Day     Packs/day: 0.50     Types: Cigarettes    Smokeless tobacco: Former   Substance Use Topics    Alcohol use: No       No family history on file. Review of Systems   Constitutional:  Negative for activity change, appetite change, chills, diaphoresis, fatigue and fever. HENT:  Negative for congestion, ear discharge, ear pain, facial swelling, hearing loss and sore throat. Eyes:  Negative for pain, discharge, redness and itching. Respiratory:  Negative for apnea, cough, choking, chest tightness, shortness of breath and wheezing. Gastrointestinal:  Negative for abdominal pain, anal bleeding, blood in stool, constipation, diarrhea, nausea, rectal pain and vomiting. Endocrine: Negative for cold intolerance and heat intolerance. Genitourinary:  Negative for difficulty urinating, dysuria and flank pain. Musculoskeletal:  Positive for arthralgias and myalgias. Negative for back pain, gait problem, joint swelling and neck stiffness. Skin:  Negative for rash. Neurological:  Negative for dizziness, light-headedness and headaches. Psychiatric/Behavioral:  Negative for agitation, behavioral problems, confusion, sleep disturbance and suicidal ideas. The patient is not nervous/anxious.         Physical Exam  Vitals and nursing note reviewed. Exam conducted with a chaperone present. Constitutional:       General: She is not in acute distress. Appearance: Normal appearance. She is normal weight. She is not ill-appearing, toxic-appearing or diaphoretic. HENT:      Right Ear: External ear normal. There is no impacted cerumen. Left Ear: External ear normal. There is no impacted cerumen. Eyes:      General:         Right eye: No discharge. Left eye: No discharge. Cardiovascular:      Rate and Rhythm: Normal rate and regular rhythm. Pulmonary:      Effort: No respiratory distress. Breath sounds: Normal breath sounds. No wheezing or rhonchi. Abdominal:      General: There is no distension. Tenderness: There is no abdominal tenderness. There is no guarding. Musculoskeletal:         General: No tenderness. Normal range of motion. Cervical back: Normal range of motion and neck supple. No rigidity or tenderness. Right lower leg: No edema. Left lower leg: No edema. Skin:     Coloration: Skin is not jaundiced. Findings: No bruising, erythema, lesion or rash. Neurological:      General: No focal deficit present. Mental Status: She is alert and oriented to person, place, and time. Cranial Nerves: No cranial nerve deficit. Sensory: No sensory deficit. Motor: No weakness. Coordination: Coordination normal.   Psychiatric:         Mood and Affect: Mood normal.         Thought Content: Thought content normal.         Judgment: Judgment normal.        Assessment and plan     Plan of care has been discussed with the patient, he agrees to the plan and verbalized understanding. All his questions were answered  More than 50% of the time spent in this visit was counseling the patient about  illness and treatment options         ICD-10-CM    1.  Type 2 diabetes mellitus with diabetic polyneuropathy, without long-term current use of insulin ([facility])  [facility]   Last hemoglobin A1c 6.9 diabetes well controlled   2. At high risk for falls  Z91.81    Due to her knee pain she feels unsteady   3. Other acne  L70.8 External Referral To Dermatology   Patient requesting referral to dermatologist   4. Encounter for screening mammogram for malignant neoplasm of breast   Z12.31 [facility]ODY DIGITAL SCREEN BILATERAL      5. Ac perspective [place] pain of right knee  M25.561 [facility]   Patient will be referred to physical therapy     6. Primary osteoarthritis of right knee  M17.11 [facility]         Current Outpatient Medications   Medication Sig Dispense Refill    acetaminophen (TYLENOL) 650 MG extended release tablet Take 1,300 mg by mouth as needed      ferrous sulfate 27 MG TABS Take 65 mg by mouth daily      vitamin D 25 MCG (1000 UT) CAPS Take 1,000 Units by mouth in the morning and at bedtime      amLODIPine (NORVASC) 10 MG tablet TAKE 1 TABLET BY MOUTH EVERY DAY      aspirin 81 MG EC tablet Take by mouth daily      bimatoprost (LUMIGAN) 0.01 % SOLN ophthalmic drops Apply 1 drop to eye      doxepin (SINEQUAN) 25 MG capsule TAKE 1 CAPSULE BY MOUTH EVERY NIGHT      glipiZIDE (GLUCOTROL XL) 10 MG extended release tablet TAKE 1 TABLET BY MOUTH TWICE DAILY      insulin glargine (LANTUS SOLOSTAR) 100 UNIT/ML injection pen ADMINISTER 55 UNITS UNDER THE SKIN AT BEDTIME      pantoprazole (PROTONIX) 40 MG tablet Take 40 mg by mouth daily      simvastatin (ZOCOR) 10 MG tablet Take 10 mg by mouth       No current facility-administered medications for this visit. Patient Active Problem List    Diagnosis Date Noted    Chronic renal disease, stage III ([address] 06/20/2022    CVA, old, cognitive deficits     Obesity (BMI 30-39.9) 07/16/2018    Severe obesity (BMI 35.0-39. 9) with comorbidity ([address] 05/07/2018    Type 2 diabetes with nephropathy ([address] 03/05/2018    Depression 01/13/2015    Heel spur 11/24/2014    Sleep apnea, obstructive 09/01/2013    Diabetic neuropathy ([facility] 09/01/2013    Diabetic retinopathy ([facility] 03/02/2012    DM (diabetes mellitus) ([facility] 03/02/2012    Hyperlipidemia 03/02/2012    Glaucoma     Cataract     HTN (hypertension) 04/21/2011    GERD (gastroesophageal reflux disease) 04/21/2011    Schizophrenia ([facility] 04/21/2011    Insomnia 04/21/2011     No results found for this visit on 02/28/23. No visits with results within 3 Month(s) from this visit.    Latest known visit with results is:   Orders Only on 09/13/2022   Component Date Value Ref Range Status    WBC 09/13/2022 12.3  4.6 - 13.2 K/uL Final    RBC 09/13/2022 4.62  4.20 - 5.30 M/uL Final    Hemoglobin 09/13/2022 13.6  12.0 - 16.0 g/dL Final    Hematocrit 09/13/2022 42.3  35.0 - 45.0 % Final    MCV 09/13/2022 91.6  78.0 - 100.0 FL Final    MCH 09/13/2022 29.4  24.0 - 34.0 PG Final    MCHC 09/13/2022 32.2  31.0 - 37.0 g/dL Final    RDW 09/13/2022 13.0  11.6 - 14.5 % Final    Platelets 20/59/8018 433 (A)  135 - 420 K/uL Final    MPV 09/13/2022 10.4  9.2 - 11.8 FL Final    Nucleated RBCs 09/13/2022 0.0  0  WBC Final    NRBC Absolute 09/13/2022 0.00  0.00 - 0.01 K/uL Final    Neutrophils % 09/13/2022 70  40 - 73 % Final    Lymphocytes % 09/13/2022 22  21 - 52 % Final    Monocytes % 09/13/2022 6  3 - 10 % Final    Eosinophils % 09/13/2022 1  0 - 5 % Final    Basophils % 09/13/2022 1  0 - 2 % Final    Immature Granulocytes 09/13/2022 0  0.0 - 0.5 % Final    Neutrophils Absolute 09/13/2022 8.6 (A)  1.8 - 8.0 K/UL Final    Lymphocytes Absolute 09/13/2022 2.7  0.9 - 3.6 K/UL Final    Monocytes Absolute 09/13/2022 0.8  0.05 - 1.2 K/UL Final    Eosinophils Absolute 09/13/2022 0.1  0.0 - 0.4 K/UL Final    Basophils Absolute 09/13/2022 0.1  0.0 - 0.1 K/UL Final    Granulocyte Absolute Count 09/13/2022 0.0  0.00 - 0.04 K/UL Final    Differential Type 09/13/2022 AUTOMATED    Final    Sodium 09/13/2022 141  136 - 145 mmol/L Final    Potassium 09/13/2022 4.3  3.5 - 5.5 mmol/L Final    Chloride 09/13/2022 106  100 - 111 mmol/L Final    CO2 09/13/2022 28  21 - 32 mmol/L Final    Anion Gap 09/13/2022 7  3.0 - 18 mmol/L Final    Glucose 09/13/2022 63 (A)  74 - 99 mg/dL Final    BUN 09/13/2022 13  7.0 - 18 MG/DL Final    Creatinine 09/13/2022 1.07  0.6 - 1.3 MG/DL Final    Bun/Cre Ratio 09/13/2022 12  12 - 20   Final    GFR  09/13/2022 >60  >60 ml/min/1.73m2 Final    EGFR IF NonAfrican American 09/13/2022 51 (A)  >60 ml/min/1.73m2 Final    Comment: (NOTE)  Estimated GFR is calculated using the Modification of Diet in Renal   Disease (MDRD) Study equation, reported for both  Americans   (GFRAA) and non- Americans (GFRNA), and normalized to 1.73m2   body surface area. The physician must decide which value applies to   the patient. The MDRD study equation should only be used in   individuals age 25 or older. It has not been validated for the   following: pregnant women, patients with serious comorbid conditions,   or on certain medications, or persons with extremes of body size,   muscle mass, or nutritional status.       Calcium 09/13/2022 9.7  8.5 - 10.1 MG/DL Final    Total Bilirubin 09/13/2022 0.5  0.2 - 1.0 MG/DL Final    ALT 09/13/2022 27  13 - 56 U/L Final    AST 09/13/2022 18  10 - 38 U/L Final    Alkaline Phosphatase 09/13/2022 91  45 - 117 U/L Final    Total Protein 09/13/2022 7.3  6.4 - 8.2 g/dL Final    Albumin 09/13/2022 3.9  3.4 - 5.0 g/dL Final    Globulin 09/13/2022 3.4  2.0 - 4.0 g/dL Final    Albumin/Globulin Ratio 09/13/2022 1.1  0.8 - 1.7   Final    Hemoglobin A1C 09/13/2022 6.8 (A)  4.2 - 5.6 % Final    Comment: (NOTE)  HbA1C Interpretive Ranges  <5.7              Normal  5.7 - 6.4         Consider Prediabetes  >6.5              Consider Diabetes      LIPID PANEL 09/13/2022        Final    Cholesterol, Total 09/13/2022 147  <200 MG/DL Final    Triglycerides 09/13/2022 123  <150 MG/DL Final    Comment: The drugs N-acetylcysteine (NAC) and  Metamiszole have been found to cause falsely  low results in this chemical assay. Please  be sure to submit blood samples obtained  BEFORE administration of either of these  drugs to assure correct results. HDL 09/13/2022 57  40 - 60 MG/DL Final    LDL Calculated 09/13/2022 65.4  0 - 100 MG/DL Final    VLDL Cholesterol Calculated 09/13/2022 24.6  MG/DL Final    Chol/HDL Ratio 09/13/2022 2.6  0 - 5.0   Final    Vit D, 25-Hydroxy 09/13/2022 105.3 (A)  30 - 100 ng/mL Final    Comment: (NOTE)  Deficiency               <20 ng/mL  Insufficiency          20-30 ng/mL  Sufficient             ng/mL  Possible toxicity       >100 ng/mL    The Method used is Siemens Advia Centaur currently standardized to a   Center of Disease Control and Prevention (CDC) certified reference   [address] [place] [address]. Samples containing fluorescein dye can produce falsely   elevated values when tested with the ADVIA Centaur Vitamin D Assay. It is recommended that results in the toxic range, >100 ng/mL, be   retested 72 hours post fluorescein exposure. Return in about 3 months (around 5/28/2023) for medicare wellness.

## 2023-03-01 NOTE — TELEPHONE ENCOUNTER
Michelle Lopez called for their medication refill.     Last Office visit:  2/28/2023    Last Filled: 11/21/2022; Qty 15ml w/ 3 refills     Follow up visit:    Future Appointments   Date Time Provider Pauline Mon   5/4/2023 11:00 AM LAB_BS[facility] BSMA BS AMB   5/4/2023 11:30 AM Lore Castano MD [facility] BS AMB

## 2023-03-04 RX ORDER — INSULIN GLARGINE 100 [IU]/ML
INJECTION, SOLUTION SUBCUTANEOUS
Qty: 15 ML | Refills: 3 | Status: SHIPPED | OUTPATIENT
Start: 2023-03-04

## 2023-03-08 DIAGNOSIS — E78.2 MIXED HYPERLIPIDEMIA: Primary | ICD-10-CM

## 2023-03-08 RX ORDER — SIMVASTATIN 10 MG
TABLET ORAL
Qty: 90 TABLET | Refills: 3 | Status: SHIPPED | OUTPATIENT
Start: 2023-03-08

## 2023-03-13 ENCOUNTER — HOSPITAL ENCOUNTER (OUTPATIENT)
Facility: HOSPITAL | Age: 68
Setting detail: RECURRING SERIES
Discharge: HOME | End: 2023-03-16
Payer: MEDICARE

## 2023-03-13 PROCEDURE — 97162 PT EVAL MOD COMPLEX 30 MIN: CPT

## 2023-03-13 NOTE — PROGRESS NOTES
PHYSICAL / OCCUPATIONAL THERAPY - DAILY TREATMENT NOTE (updated )  For Eval visit    Patient Name: Derek Frankel    Date: 3/13/2023    : 1955  Insurance: Payor: [facility] MEDICARE / Plan: Geraldine Gore COMPLETE / Product Type: *No Product type* /      Patient  verified yes     Visit #   Current / Total 1 13   Time   In / Out 2:01 2:40   Pain   In / Out 0 0   Subjective Functional Status/Changes: See POC   Changes to:  Meds, Allergies, Med Hx, Sx Hx? If yes, update Summary List no       TREATMENT AREA =  R knee pain    OBJECTIVE       39 min   Eval - untimed                      Therapeutic Procedures: Tx Min Billable or 1:1 Min (if diff from [employer]) Procedure, Rationale, Specifics          Details if applicable:            Details if applicable:            Details if applicable:            Details if applicable:       [facility] Totals Reminder: bill using total billable min of TIMED therapeutic procedures (example: do not include dry needle or estim unattended, both untimed codes, in totals to left)  8-22 min = 1 unit; 23-37 min = 2 units; 38-52 min = 3 units; 53-67 min = 4 units; 68-82 min = 5 units   Total Total     [x]  Patient Education billed concurrently with other procedures   [x] Review HEP    [] Progressed/Changed HEP, detail:    [] Other detail:       Objective Information/Functional Measures/Assessment    See POC    Patient will continue to benefit from skilled PT / OT services to modify and progress therapeutic interventions, analyze and address functional mobility deficits, analyze and address ROM deficits, analyze and address strength deficits, analyze and address soft tissue restrictions, analyze and cue for proper movement patterns, analyze and modify for postural abnormalities, and instruct in home and community integration to address functional deficits and attain remaining goals.     Progress toward goals / Updated goals:  [x]  See POC    See POC    PLAN  yes Continue plan of care  []  Upgrade activities as tolerated  []  Discharge due to :  []  Other:    Dustin Strange, PT    3/13/2023    11:17 AM    Future Appointments   Date Time Provider Pauline Mon   3/13/2023  2:00 PM Dustin Strange, PT [facility] [id number]   5/4/2023 11:00 AM LAB_BSRACHEL OLMSTEAD BS AMB   5/4/2023 11:30 AM MD AYSHA Rogers BS AMB

## 2023-03-13 NOTE — THERAPY EVALUATION
[facility], [facility], [address] [place] [address] HL:[phone] Fx: [phone]  Plan of Care / Statement of Necessity for Physical Therapy Services     Patient Name: Giovanny James : 1955   Medical   Diagnosis: R knee pain Treatment Diagnosis: R knee pain   Onset Date: 2023     Referral Source: Serg Flower MD Start of Care [facility]): 3/13/2023   Prior Hospitalization: See medical history Provider #: [id number]   Prior Level of Function: No pain with prolonged walking   Comorbidities: Anxiety, arthritis, BMI over 30, Depression, Diabetes, HTN, sleep dysfunction, tobacco use, weight change over 10lbs   Medications: Verified on Patient Summary List     Assessment / key information: Giovanny James is a 79 y.o. female with Dx: R knee pain starting 2 weeks ago when she was exercising with a young child, jumped and heard a pop. Notes she felt like her knee was \"slipping\" and she fell coming out of the grocery store a few days later. Went to the ER due to not being able to stand back up. X-rays taken at ER were clear for fractures but showed arthritis. Reports more pain with prolonged walking over 5 min and laying down in bed; more pain in the morning. Uses electric car at the grocery store and has crutches, but doesn't use those. Also complains of burning pain down the legs sometimes; denies back pain, but notes more pain in the buttock. Pt rates pain as 6/10 max, 0/10 at best, 0/10 currently. Occupation: not working - retired  for [employer]  Objective: FOTO score = 47 (an established functional score where 100 = no disability). Gait: Decreased hip ext bilat, decreased weight acceptance on R. Palpation TTP along medial patella and medial joint line on R.   Knee ROM Flexion R 118 L 110 with pain, Extension R 7 of hyper L 0 with pain.    Strength: Hip Flexion R 4/5 L 4/5, ABD R 4-/5 L 4-/5, Extension R 3+/5 L 3+/5, Knee Flexion R 4-/5 L 4/5, Extension R 4-/5 L 4/5. Functional Squat knee angle 80 with slight pain in the knee. SLS R 6sec L 10sec  Current deficits include decreased strength and ROM in R knee leading to more pain with prolonged weight bearing. Pt will benefit from a comprehensive POC/HEP to address impairments and restore function in order to return to prior level of function and prevent secondary impairments. Evaluation Complexity:  History:  HIGH Complexity :3+ comorbidities / personal factors will impact the outcome/ POC ; Examination:  HIGH Complexity : 4+ Standardized tests and measures addressing body structure, function, activity limitation and / or participation in recreation  ;Presentation:  MEDIUM Complexity : Evolving with changing characteristics  ; Clinical Decision Making:  MEDIUM Complexity : FOTO score of 26-74 FOTO score = an established functional score where 100 = no disability  Overall Complexity Rating: MEDIUM  Problem List: pain affecting function, decrease ROM, decrease strength, impaired gait/balance, decrease ADL/functional abilities, decrease activity tolerance, decrease flexibility/joint mobility, and decrease transfer abilities    Treatment Plan may include any combination of the followin Therapeutic Exercise, 02870 Neuromuscular Re-Education, 98578 Manual Therapy, 11120 Therapeutic Activity, 00756 Self Care/Home Management, 89524 Electrical Stim unattended, 88397 Electrical Stim attended, 81807 Vasopneumatic Device, [id number] Aquatic Therapy, [id number] Gait Training, [id number] Ultrasound, [id number] Needle Insertion w/o Injection (1 or 2 muscles), 76090 Needle Insertion w/o Injection (3+ muscles), and 47039 Orthotic Management and Training  Patient / Family readiness to learn indicated by: asking questions, trying to perform skills, interest, return verbalization , and return demonstration   Persons(s) to be included in education: patient (P)  Barriers to Learning/Limitations: none  Measures taken if barriers to learning present: na  Patient Goal (s): help with me arms and legs  Patient Self Reported Health Status: fair  Rehabilitation Potential: good  Short Term Goals: To be accomplished in 3 weeks  Independent with HEP to progress to meet goals. 2. Pt to report decreased max pain levels less than 5/10 for improvement in ADLs. Long Term Goals: To be accomplished in 6 weeks  Improve FOTO score to 66/100 to show a significant functional change. 2. Pt to report 50% decrease in symptoms for improvement in QoL. 3. Pt to report walking 30min to resume grocery shopping independently. Frequency / Duration: Patient to be seen 2 times per week for 6 weeks    Patient/ Caregiver education and instruction: Diagnosis, prognosis, self care, activity modification, and exercises [x]  Plan of care has been reviewed with PTA    Certification Period: saira Strange, PT       3/13/2023       11:18 AM  ===================================================================  I certify that the above Therapy Services are being furnished while the patient is under my care. I agree with the treatment plan and certify that this therapy is necessary. [de-identified] Signature:_________________________   DATE:_________   TIME:________                           Eloy Zimmerman MD    ** Signature, Date and Time must be completed for valid certification **  Please sign and fax to [facility] [phone].   Thank you

## 2023-03-15 ENCOUNTER — HOSPITAL ENCOUNTER (OUTPATIENT)
Facility: HOSPITAL | Age: 68
Setting detail: RECURRING SERIES
Discharge: HOME | End: 2023-03-18
Payer: MEDICARE

## 2023-03-15 PROCEDURE — 97112 NEUROMUSCULAR REEDUCATION: CPT

## 2023-03-15 PROCEDURE — 97110 THERAPEUTIC EXERCISES: CPT

## 2023-03-15 PROCEDURE — 97535 SELF CARE MNGMENT TRAINING: CPT

## 2023-03-15 RX ORDER — PANTOPRAZOLE SODIUM 40 MG/1
40 TABLET, DELAYED RELEASE ORAL DAILY
Qty: 30 TABLET | Refills: 2 | Status: SHIPPED | OUTPATIENT
Start: 2023-03-15 | End: 2023-04-14

## 2023-03-15 NOTE — PROGRESS NOTES
PHYSICAL / OCCUPATIONAL THERAPY - DAILY TREATMENT NOTE (updated )    Patient Name: Madeline Can    Date: 3/15/2023    : 1955  Insurance: Payor: [facility] MEDICARE / Plan: Debborah Gentleman COMPLETE / Product Type: *No Product type* /      Patient  verified Yes     Visit #   Current / Total 2 13   Time   In / Out 200 244   Pain   In / Out 6 0   Subjective Functional Status/Changes: Patient says that she wants to get stronger. Says that the knee hurts when she stands too long. Says that she will use the motorized carts at the store. Changes to:  Meds, Allergies, Med Hx, Sx Hx? If yes, update Summary List no       TREATMENT AREA =  Pain in right knee [M25.561]    OBJECTIVE         Therapeutic Procedures: Tx Min Billable or 1:1 Min (if diff from Tx Min) Procedure, Rationale, Specifics   20  47125 Therapeutic Exercise (timed):  increase ROM, strength, coordination, balance, and proprioception to improve patient's ability to progress to PLOF and address remaining functional goals. (see flow sheet as applicable)     Details if applicable:       14  20458 Neuromuscular Re-Education (timed):  improve balance, coordination, kinesthetic sense, posture, core stability and proprioception to improve patient's ability to develop conscious control of individual muscles and awareness of position of extremities in order to progress to PLOF and address remaining functional goals. (see flow sheet as applicable)     Details if applicable:       41020 Manual Therapy (timed):  decrease pain, increase ROM, increase tissue extensibility, decrease edema, correct positional vertigo, decrease trigger points, and increase postural awareness to improve patient's ability to progress to PLOF and address remaining functional goals. The manual therapy interventions were performed at a separate and distinct time from the therapeutic activities interventions .  (see flow sheet as applicable)     Details if applicable: 53839 Therapeutic Activity (timed):  use of dynamic activities replicating functional movements to increase ROM, strength, coordination, balance, and proprioception in order to improve patient's ability to progress to PLOF and address remaining functional goals. (see flow sheet as applicable)     Details if applicable:     10  58839 Self Care/Home Management (timed):  improve patient knowledge and understanding of pain reducing techniques, positioning, posture/ergonomics, home safety, activity modification, diagnosis/prognosis, and physical therapy expectations, procedures and progression  to improve patient's ability to progress to PLOF and address remaining functional goals. (see flow sheet as applicable)     Details if applicable:     40  [facility] Totals Reminder: bill using total billable min of TIMED therapeutic procedures (example: do not include dry needle or estim unattended, both untimed codes, in totals to left)  8-22 min = 1 unit; 23-37 min = 2 units; 38-52 min = 3 units; 53-67 min = 4 units; 68-82 min = 5 units   Total Total     [x]  Patient Education billed concurrently with other procedures   [x] Review HEP    [x] Progressed/Changed HEP, detail:   Access Code: [email or web address]  URL: [email or web address]  Date: 03/15/2023  Prepared by: Zenaida Odom    Exercises  Supine Bridge - 1 x daily - 4 x weekly - 3 sets - 10 reps  Supine Active Straight Leg Raise - 1 x daily - 4 x weekly - 3 sets - 10 reps  Clamshell with Resistance - 1 x daily - 4 x weekly - 3 sets - 10 reps  Side Stepping with Resistance at Feet - 1 x daily - 4 x weekly - 2 sets - 10 reps  Gastroc Stretch on Wall - 1 x daily - 7 x weekly - 1 sets - 3 reps - 30 seconds hold    [] Other detail:       Objective Information/Functional Measures/Assessment    See Flow Sheet  Chart reviewed and subjective taken. Pt requires cues and instruction for all drills, form, therapeutic focus, and carryover.  Followed up on HEP plan to ensure compliance. Patient began session with 6/10 and ended with 0/10 - she noted that the exercises helped. Patient will continue to benefit from skilled PT / OT services to modify and progress therapeutic interventions, analyze and address functional mobility deficits, analyze and address ROM deficits, analyze and address strength deficits, analyze and address soft tissue restrictions, analyze and cue for proper movement patterns, analyze and modify for postural abnormalities, analyze and address imbalance/dizziness, and instruct in home and community integration to address functional deficits and attain remaining goals.     Progress toward goals / Updated goals:  []  See Progress Note/Recertification    First follow up since IE    PLAN  Yes  Continue plan of care  [x]  Upgrade activities as tolerated  []  Discharge due to :  []  Other:    Chrissy Valverde, PHAM    3/15/2023    2:50 PM    Future Appointments   Date Time Provider Pauline Mon   3/20/2023 12:00 PM Jayna Flower, PT [address]   3/22/2023 12:00 PM Jayna Flower, PT [address]   3/27/2023 12:00 PM Jayna Flower PT [address]   3/29/2023 12:00 PM Ernestine Hi PT [address]   5/4/2023 11:00 AM LAB_[facility] BS AMB   5/4/2023 11:30 AM Kaz Robles MD [facility] BS AMB

## 2023-03-15 NOTE — TELEPHONE ENCOUNTER
Deanna Carlos called for their medication refill.     Last Office visit:  2/28/2023    Last Filled: 7/28/2022; Qty 90 w/ 1 refill     Follow up visit:    Future Appointments   Date Time Provider Pauline Mon   3/15/2023  2:00 PM Arpita Jaramillo [facility]   3/20/2023 12:00 PM Walter Diaz, PT [address]   3/22/2023 12:00 PM Walter Diaz, PT [address]   3/27/2023 12:00 PM Clydegeorge Diaz, PT [address]   3/29/2023 12:00 PM Ernestine Hi, PT [address]   5/4/2023 11:00 AM LAB_AYSHA EDWARDSMA BS AMB   5/4/2023 11:30 AM Shekhar Hart MD [facility] BS AMB

## 2023-03-15 NOTE — TELEPHONE ENCOUNTER
Pt called to requesting a refill on Pantoprazole 40mg. Pharmacy:[facility] [address], [address] [facility] [phone]   [address] [facility] [address]     Future Appointments   Date Time Provider Pauline Mon   5/4/2023 11:00 AM LAB_[facility] BS AMB   5/4/2023 11:30 AM Everett Panda MD [facility] BS AMB        Pt states she has been out of the medication for about 2 or 3 days.

## 2023-03-17 RX ORDER — PANTOPRAZOLE SODIUM 40 MG/1
TABLET, DELAYED RELEASE ORAL
Qty: 90 TABLET | OUTPATIENT
Start: 2023-03-17

## 2023-03-20 ENCOUNTER — HOSPITAL ENCOUNTER (OUTPATIENT)
Facility: HOSPITAL | Age: 68
Setting detail: RECURRING SERIES
Discharge: HOME | End: 2023-03-23
Payer: MEDICARE

## 2023-03-20 PROCEDURE — 97112 NEUROMUSCULAR REEDUCATION: CPT | Performed by: PHYSICAL THERAPIST

## 2023-03-20 PROCEDURE — 97110 THERAPEUTIC EXERCISES: CPT | Performed by: PHYSICAL THERAPIST

## 2023-03-20 NOTE — PROGRESS NOTES
Min) Procedure, Rationale, Specifics   16  02317 Therapeutic Exercise (timed):  increase ROM, strength, coordination, balance, and proprioception to improve patient's ability to progress to PLOF and address remaining functional goals. (see flow sheet as applicable)     Details if applicable:       10  99861 Neuromuscular Re-Education (timed):  improve balance, coordination, kinesthetic sense, posture, core stability and proprioception to improve patient's ability to develop conscious control of individual muscles and awareness of position of extremities in order to progress to PLOF and address remaining functional goals. (see flow sheet as applicable)     Details if applicable:       05103 Manual Therapy (timed):  decrease pain, increase ROM, increase tissue extensibility, and decrease trigger points to improve patient's ability to progress to PLOF and address remaining functional goals. The manual therapy interventions were performed at a separate and distinct time from the therapeutic activities interventions . (see flow sheet as applicable)     Details if applicable:       05983 Therapeutic Activity (timed):  use of dynamic activities replicating functional movements to increase ROM, strength, coordination, balance, and proprioception in order to improve patient's ability to progress to PLOF and address remaining functional goals. (see flow sheet as applicable)     Details if applicable:       30335 Self Care/Home Management (timed):  improve patient knowledge and understanding of pain reducing techniques, positioning, posture/ergonomics, home safety, activity modification, diagnosis/prognosis, and physical therapy expectations, procedures and progression  to improve patient's ability to progress to PLOF and address remaining functional goals.   (see flow sheet as applicable)     Details if applicable:       [facility] Totals Reminder: bill using total billable min of TIMED therapeutic procedures (example: do not

## 2023-03-22 ENCOUNTER — HOSPITAL ENCOUNTER (OUTPATIENT)
Facility: HOSPITAL | Age: 68
Setting detail: RECURRING SERIES
Discharge: HOME | End: 2023-03-25
Payer: MEDICARE

## 2023-03-22 PROCEDURE — 97140 MANUAL THERAPY 1/> REGIONS: CPT | Performed by: PHYSICAL THERAPIST

## 2023-03-22 PROCEDURE — 97110 THERAPEUTIC EXERCISES: CPT | Performed by: PHYSICAL THERAPIST

## 2023-03-22 NOTE — PROGRESS NOTES
or estim unattended, both untimed codes, in totals to left)  8-22 min = 1 unit; 23-37 min = 2 units; 38-52 min = 3 units; 53-67 min = 4 units; 68-82 min = 5 units   Total Total     [x]  Patient Education billed concurrently with other procedures   [x] Review HEP    [] Progressed/Changed HEP, detail:    [] Other detail:       Objective Information/Functional Measures/Assessment    Te as per flow sheet with full slr without lag     Patient will continue to benefit from skilled PT / OT services to modify and progress therapeutic interventions, analyze and address functional mobility deficits, analyze and address ROM deficits, analyze and address strength deficits, analyze and address soft tissue restrictions, analyze and cue for proper movement patterns, analyze and modify for postural abnormalities, analyze and address imbalance/dizziness, and instruct in home and community integration to address functional deficits and attain remaining goals.     Progress toward goals / Updated goals:  []  See Progress Note/Recertification    Progress with pain reduction goal but could be attributed to medication vs PT    PLAN  Yes  Continue plan of care  [x]  Upgrade activities as tolerated  []  Discharge due to :  []  Other:    Lillian Burt, PT    3/22/2023    2:10 PM    Future Appointments   Date Time Provider Pauline Yaa   3/27/2023 12:00 PM Lillian Burt PT [facility]   3/29/2023 12:00 PM Lillian Burt PT [facility] [address]   5/4/2023 11:00 AM LAB_BSMA JERRYMA BS AMB   5/4/2023 11:30 AM MD AYSHA Bates BS AMB

## 2023-03-26 ENCOUNTER — TELEPHONE (OUTPATIENT)
Dept: FAMILY MEDICINE CLINIC | Facility: CLINIC | Age: 68
End: 2023-03-26

## 2023-03-26 NOTE — TELEPHONE ENCOUNTER
Please as the patient went with the last visit for her eye doctor for her glaucoma as well as diabetes eye exam    The medication she is taking doxepin can make her glaucoma worse it might need to to stop it to avoid any further complication for her glaucoma, and to consider a different medication for sleep

## 2023-03-27 ENCOUNTER — HOSPITAL ENCOUNTER (OUTPATIENT)
Facility: HOSPITAL | Age: 68
Setting detail: RECURRING SERIES
Discharge: HOME | End: 2023-03-30
Payer: MEDICARE

## 2023-03-27 PROCEDURE — 97140 MANUAL THERAPY 1/> REGIONS: CPT | Performed by: PHYSICAL THERAPIST

## 2023-03-27 PROCEDURE — 97110 THERAPEUTIC EXERCISES: CPT | Performed by: PHYSICAL THERAPIST

## 2023-03-27 NOTE — PROGRESS NOTES
therapeutic procedures (example: do not include dry needle or estim unattended, both untimed codes, in totals to left)  8-22 min = 1 unit; 23-37 min = 2 units; 38-52 min = 3 units; 53-67 min = 4 units; 68-82 min = 5 units   Total Total     [x]  Patient Education billed concurrently with other procedures   [x] Review HEP    [] Progressed/Changed HEP, detail:    [] Other detail:       Objective Information/Functional Measures/Assessment    Increased weights as per flow sheet no pain reported     Patient will continue to benefit from skilled PT / OT services to modify and progress therapeutic interventions, analyze and address functional mobility deficits, analyze and address ROM deficits, analyze and address strength deficits, analyze and address soft tissue restrictions, analyze and cue for proper movement patterns, analyze and modify for postural abnormalities, analyze and address imbalance/dizziness, and instruct in home and community integration to address functional deficits and attain remaining goals.     Progress toward goals / Updated goals:  []  See Progress Note/Recertification    Progress with pain reduction since last visit     PLAN  Yes  Continue plan of care  [x]  Upgrade activities as tolerated  []  Discharge due to :  []  Other:    Jyoti Toure, PT    3/27/2023    12:31 PM    Future Appointments   Date Time Provider Pauline Yaa   3/29/2023 12:00 PM Jyoti Toure PT [facility]   5/4/2023 11:00 AM LAB_BS[facility] BS AMB   5/4/2023 11:30 AM Roxanne Diaz MD [facility] BS AMB

## 2023-03-29 ENCOUNTER — HOSPITAL ENCOUNTER (OUTPATIENT)
Facility: HOSPITAL | Age: 68
Setting detail: RECURRING SERIES
Discharge: HOME | End: 2023-04-01
Payer: MEDICARE

## 2023-03-29 PROCEDURE — 97140 MANUAL THERAPY 1/> REGIONS: CPT | Performed by: PHYSICAL THERAPIST

## 2023-03-29 PROCEDURE — 97110 THERAPEUTIC EXERCISES: CPT | Performed by: PHYSICAL THERAPIST

## 2023-03-29 NOTE — PROGRESS NOTES
Measures/Assessment    Full rom of knee  Te as per flow sheet with cues required for sets only    Patient will continue to benefit from skilled PT / OT services to modify and progress therapeutic interventions, analyze and address functional mobility deficits, analyze and address ROM deficits, analyze and address strength deficits, analyze and address soft tissue restrictions, analyze and cue for proper movement patterns, analyze and modify for postural abnormalities, analyze and address imbalance/dizziness, and instruct in home and community integration to address functional deficits and attain remaining goals.     Progress toward goals / Updated goals:  []  See Progress Note/Recertification    Ltg #2 and 3    PLAN  Yes  Continue plan of care  []  Upgrade activities as tolerated  []  Discharge due to :  []  Other:    Fields Body, PT    3/29/2023    2:07 PM    Future Appointments   Date Time Provider Pauline Mon   5/4/2023 11:00 AM LAB_[facility] BS[facility] BS AMB   5/4/2023 11:30 AM Amira Angele Rinne, MD [facility] BS AMB

## 2023-04-21 NOTE — TELEPHONE ENCOUNTER
Gurvinder Roper called for their medication refill.     Last Office visit:  2/28/23    Last Filled: 1/23/2023; Qty 180 w/ 0 refills     Follow up visit:    Future Appointments   Date Time Provider Pauline Mon   5/4/2023 11:00 AM LAB_[facility] JERRYMA BS AMB   5/4/2023 11:30 AM MD AYSHA Fritz BS AMB

## 2023-04-23 RX ORDER — GLIPIZIDE 10 MG/1
TABLET, FILM COATED, EXTENDED RELEASE ORAL
Qty: 180 TABLET | Refills: 0 | Status: SHIPPED | OUTPATIENT
Start: 2023-04-23

## 2023-05-04 ENCOUNTER — HOSPITAL ENCOUNTER (OUTPATIENT)
Facility: HOSPITAL | Age: 68
Setting detail: SPECIMEN
Discharge: HOME | End: 2023-05-04
Payer: MEDICARE

## 2023-05-04 ENCOUNTER — OFFICE VISIT (OUTPATIENT)
Dept: FAMILY MEDICINE CLINIC | Facility: CLINIC | Age: 68
End: 2023-05-04
Payer: MEDICARE

## 2023-05-04 VITALS
SYSTOLIC BLOOD PRESSURE: 116 MMHG | HEART RATE: 89 BPM | BODY MASS INDEX: 33.15 KG/M2 | WEIGHT: 199 LBS | HEIGHT: 65 IN | OXYGEN SATURATION: 98 % | TEMPERATURE: 98.3 F | RESPIRATION RATE: 17 BRPM | DIASTOLIC BLOOD PRESSURE: 70 MMHG

## 2023-05-04 DIAGNOSIS — F32.A DEPRESSION, UNSPECIFIED DEPRESSION TYPE: ICD-10-CM

## 2023-05-04 DIAGNOSIS — H91.93 DECREASED HEARING OF BOTH EARS: ICD-10-CM

## 2023-05-04 DIAGNOSIS — E78.2 MIXED HYPERLIPIDEMIA: ICD-10-CM

## 2023-05-04 DIAGNOSIS — E11.42 TYPE 2 DIABETES MELLITUS WITH DIABETIC POLYNEUROPATHY, WITHOUT LONG-TERM CURRENT USE OF INSULIN (HCC): ICD-10-CM

## 2023-05-04 DIAGNOSIS — M17.11 PRIMARY OSTEOARTHRITIS OF RIGHT KNEE: ICD-10-CM

## 2023-05-04 DIAGNOSIS — F20.9 SCHIZOPHRENIA, UNSPECIFIED TYPE (HCC): ICD-10-CM

## 2023-05-04 DIAGNOSIS — F41.1 GENERALIZED ANXIETY DISORDER: ICD-10-CM

## 2023-05-04 DIAGNOSIS — E11.42 TYPE 2 DIABETES MELLITUS WITH DIABETIC POLYNEUROPATHY, WITH LONG-TERM CURRENT USE OF INSULIN (HCC): ICD-10-CM

## 2023-05-04 DIAGNOSIS — Z00.00 MEDICARE ANNUAL WELLNESS VISIT, SUBSEQUENT: Primary | ICD-10-CM

## 2023-05-04 DIAGNOSIS — M47.896 OTHER OSTEOARTHRITIS OF SPINE, LUMBAR REGION: ICD-10-CM

## 2023-05-04 DIAGNOSIS — Z91.81 AT HIGH RISK FOR FALLS: ICD-10-CM

## 2023-05-04 DIAGNOSIS — Z79.4 TYPE 2 DIABETES MELLITUS WITH DIABETIC POLYNEUROPATHY, WITH LONG-TERM CURRENT USE OF INSULIN (HCC): ICD-10-CM

## 2023-05-04 DIAGNOSIS — N18.31 STAGE 3A CHRONIC KIDNEY DISEASE (HCC): ICD-10-CM

## 2023-05-04 DIAGNOSIS — E11.42 TYPE 2 DIABETES MELLITUS WITH DIABETIC POLYNEUROPATHY, WITHOUT LONG-TERM CURRENT USE OF INSULIN (HCC): Primary | ICD-10-CM

## 2023-05-04 LAB
ANION GAP SERPL CALC-SCNC: 3 MMOL/L (ref 3–18)
BUN SERPL-MCNC: 13 MG/DL (ref 7–18)
BUN/CREAT SERPL: 15 (ref 12–20)
CALCIUM SERPL-MCNC: 9.9 MG/DL (ref 8.5–10.1)
CHLORIDE SERPL-SCNC: 105 MMOL/L (ref 100–111)
CO2 SERPL-SCNC: 33 MMOL/L (ref 21–32)
CREAT SERPL-MCNC: 0.85 MG/DL (ref 0.6–1.3)
EST. AVERAGE GLUCOSE BLD GHB EST-MCNC: 140 MG/DL
GLUCOSE SERPL-MCNC: 82 MG/DL (ref 74–99)
HBA1C MFR BLD: 6.5 % (ref 4.2–5.6)
POTASSIUM SERPL-SCNC: 4.2 MMOL/L (ref 3.5–5.5)
SODIUM SERPL-SCNC: 141 MMOL/L (ref 136–145)

## 2023-05-04 PROCEDURE — 3078F DIAST BP <80 MM HG: CPT | Performed by: LEGAL MEDICINE

## 2023-05-04 PROCEDURE — 80048 BASIC METABOLIC PNL TOTAL CA: CPT

## 2023-05-04 PROCEDURE — 1123F ACP DISCUSS/DSCN MKR DOCD: CPT | Performed by: LEGAL MEDICINE

## 2023-05-04 PROCEDURE — 3044F HG A1C LEVEL LT 7.0%: CPT | Performed by: LEGAL MEDICINE

## 2023-05-04 PROCEDURE — 83036 HEMOGLOBIN GLYCOSYLATED A1C: CPT

## 2023-05-04 PROCEDURE — 36415 COLL VENOUS BLD VENIPUNCTURE: CPT

## 2023-05-04 PROCEDURE — 3017F COLORECTAL CA SCREEN DOC REV: CPT | Performed by: LEGAL MEDICINE

## 2023-05-04 PROCEDURE — 3074F SYST BP LT 130 MM HG: CPT | Performed by: LEGAL MEDICINE

## 2023-05-04 PROCEDURE — G0439 PPPS, SUBSEQ VISIT: HCPCS | Performed by: LEGAL MEDICINE

## 2023-05-04 RX ORDER — QUETIAPINE FUMARATE 100 MG/1
100 TABLET, FILM COATED ORAL NIGHTLY
Qty: 90 TABLET | Refills: 0 | Status: SHIPPED | OUTPATIENT
Start: 2023-05-04 | End: 2023-08-02

## 2023-05-04 SDOH — ECONOMIC STABILITY: FOOD INSECURITY: WITHIN THE PAST 12 MONTHS, YOU WORRIED THAT YOUR FOOD WOULD RUN OUT BEFORE YOU GOT MONEY TO BUY MORE.: NEVER TRUE

## 2023-05-04 SDOH — ECONOMIC STABILITY: INCOME INSECURITY: HOW HARD IS IT FOR YOU TO PAY FOR THE VERY BASICS LIKE FOOD, HOUSING, MEDICAL CARE, AND HEATING?: SOMEWHAT HARD

## 2023-05-04 SDOH — ECONOMIC STABILITY: HOUSING INSECURITY
IN THE LAST 12 MONTHS, WAS THERE A TIME WHEN YOU DID NOT HAVE A STEADY PLACE TO SLEEP OR SLEPT IN A SHELTER (INCLUDING NOW)?: YES

## 2023-05-04 SDOH — ECONOMIC STABILITY: FOOD INSECURITY: WITHIN THE PAST 12 MONTHS, THE FOOD YOU BOUGHT JUST DIDN'T LAST AND YOU DIDN'T HAVE MONEY TO GET MORE.: NEVER TRUE

## 2023-05-04 ASSESSMENT — PATIENT HEALTH QUESTIONNAIRE - PHQ9
SUM OF ALL RESPONSES TO PHQ QUESTIONS 1-9: 0
7. TROUBLE CONCENTRATING ON THINGS, SUCH AS READING THE NEWSPAPER OR WATCHING TELEVISION: 0
3. TROUBLE FALLING OR STAYING ASLEEP: 0
SUM OF ALL RESPONSES TO PHQ QUESTIONS 1-9: 0
9. THOUGHTS THAT YOU WOULD BE BETTER OFF DEAD, OR OF HURTING YOURSELF: 0
SUM OF ALL RESPONSES TO PHQ QUESTIONS 1-9: 14
2. FEELING DOWN, DEPRESSED OR HOPELESS: 0
SUM OF ALL RESPONSES TO PHQ9 QUESTIONS 1 & 2: 0
1. LITTLE INTEREST OR PLEASURE IN DOING THINGS: 1
6. FEELING BAD ABOUT YOURSELF - OR THAT YOU ARE A FAILURE OR HAVE LET YOURSELF OR YOUR FAMILY DOWN: 0
1. LITTLE INTEREST OR PLEASURE IN DOING THINGS: 0
3. TROUBLE FALLING OR STAYING ASLEEP: 1
8. MOVING OR SPEAKING SO SLOWLY THAT OTHER PEOPLE COULD HAVE NOTICED. OR THE OPPOSITE, BEING SO FIGETY OR RESTLESS THAT YOU HAVE BEEN MOVING AROUND A LOT MORE THAN USUAL: 3
9. THOUGHTS THAT YOU WOULD BE BETTER OFF DEAD, OR OF HURTING YOURSELF: 2
4. FEELING TIRED OR HAVING LITTLE ENERGY: 0
5. POOR APPETITE OR OVEREATING: 0
10. IF YOU CHECKED OFF ANY PROBLEMS, HOW DIFFICULT HAVE THESE PROBLEMS MADE IT FOR YOU TO DO YOUR WORK, TAKE CARE OF THINGS AT HOME, OR GET ALONG WITH OTHER PEOPLE: 2
SUM OF ALL RESPONSES TO PHQ QUESTIONS 1-9: 0
5. POOR APPETITE OR OVEREATING: 1
SUM OF ALL RESPONSES TO PHQ QUESTIONS 1-9: 16
8. MOVING OR SPEAKING SO SLOWLY THAT OTHER PEOPLE COULD HAVE NOTICED. OR THE OPPOSITE, BEING SO FIGETY OR RESTLESS THAT YOU HAVE BEEN MOVING AROUND A LOT MORE THAN USUAL: 0
4. FEELING TIRED OR HAVING LITTLE ENERGY: 1
SUM OF ALL RESPONSES TO PHQ QUESTIONS 1-9: 16
2. FEELING DOWN, DEPRESSED OR HOPELESS: 1
SUM OF ALL RESPONSES TO PHQ QUESTIONS 1-9: 16
7. TROUBLE CONCENTRATING ON THINGS, SUCH AS READING THE NEWSPAPER OR WATCHING TELEVISION: 3
6. FEELING BAD ABOUT YOURSELF - OR THAT YOU ARE A FAILURE OR HAVE LET YOURSELF OR YOUR FAMILY DOWN: 3
SUM OF ALL RESPONSES TO PHQ9 QUESTIONS 1 & 2: 2
SUM OF ALL RESPONSES TO PHQ QUESTIONS 1-9: 0
10. IF YOU CHECKED OFF ANY PROBLEMS, HOW DIFFICULT HAVE THESE PROBLEMS MADE IT FOR YOU TO DO YOUR WORK, TAKE CARE OF THINGS AT HOME, OR GET ALONG WITH OTHER PEOPLE: 0

## 2023-05-04 ASSESSMENT — ANXIETY QUESTIONNAIRES
7. FEELING AFRAID AS IF SOMETHING AWFUL MIGHT HAPPEN: 3
1. FEELING NERVOUS, ANXIOUS, OR ON EDGE: 2
6. BECOMING EASILY ANNOYED OR IRRITABLE: 3
4. TROUBLE RELAXING: 3
GAD7 TOTAL SCORE: 17
2. NOT BEING ABLE TO STOP OR CONTROL WORRYING: 2
5. BEING SO RESTLESS THAT IT IS HARD TO SIT STILL: 2
3. WORRYING TOO MUCH ABOUT DIFFERENT THINGS: 2
IF YOU CHECKED OFF ANY PROBLEMS ON THIS QUESTIONNAIRE, HOW DIFFICULT HAVE THESE PROBLEMS MADE IT FOR YOU TO DO YOUR WORK, TAKE CARE OF THINGS AT HOME, OR GET ALONG WITH OTHER PEOPLE: VERY DIFFICULT

## 2023-05-04 ASSESSMENT — LIFESTYLE VARIABLES
HOW OFTEN DO YOU HAVE A DRINK CONTAINING ALCOHOL: NEVER
HOW MANY STANDARD DRINKS CONTAINING ALCOHOL DO YOU HAVE ON A TYPICAL DAY: PATIENT DOES NOT DRINK

## 2023-05-11 ENCOUNTER — CARE COORDINATION (OUTPATIENT)
Facility: CLINIC | Age: 68
End: 2023-05-11

## 2023-05-11 PROBLEM — I69.319 CVA, OLD, COGNITIVE DEFICITS: Status: ACTIVE | Noted: 2020-10-27

## 2023-05-11 PROBLEM — E11.9 TYPE 2 DIABETES MELLITUS (HCC): Status: ACTIVE | Noted: 2022-05-02

## 2023-05-11 PROBLEM — I10 HYPERTENSIVE DISORDER: Status: ACTIVE | Noted: 2022-05-02

## 2023-05-11 PROBLEM — H40.9 GLAUCOMA: Status: ACTIVE | Noted: 2020-10-27

## 2023-05-11 PROBLEM — H26.9 CATARACT: Status: ACTIVE | Noted: 2020-10-27

## 2023-05-11 ASSESSMENT — PATIENT HEALTH QUESTIONNAIRE - PHQ9
SUM OF ALL RESPONSES TO PHQ QUESTIONS 1-9: 2

## 2023-05-11 NOTE — CARE COORDINATION
care/services in place and adequate   Are current services involved with this patient well-coordinated? (Include coordination with other services you are now recommendation): Required care/services in place and adequately coordinated   Suggested Interventions and Community Resources  Diabetes Education: In Process   Fall Risk Prevention: In Process            . Goals        Care Coordination Self Management      CC Self Management Goal  Patient Goal (What steps will patient take to achieve goal?): >>>  Patient is able to discuss self-management of condition(s):  Pt demonstrates adherence to medications  Pt demonstrates understanding of self-monitoring  Patient is able to identify Red Flags:  Alert to potential adverse drug reactions(s) or side effects and actions to take should they arise  Discuss target symptoms and actions to take should they arise  Identify problems that require immediate PCP or specialist visit  Patient demonstrates understanding of access to PCP/Specialist:  Understands about scheduling routine Follow Up appointments   Understands about sick day appointment options for worsening of symptoms/progression (Same Day, E Visits)       Follows diet recommendations and achieves/maintains goal weight      Exercise as tolerated        Self-schedules and keeps appointments       Take all medications as ordered      Patient will take all medications as prescribed. To decrease or maintain patient's biometrics:  HgA1c < ?7 mg/dL, /80 or less. LDL of less than 100 and/or less than 70 in a patient with CAD. Maintain A1c < 7.0 ( 6.5 ( 5/4)                No future appointments.

## 2023-05-15 DIAGNOSIS — E11.42 TYPE 2 DIABETES MELLITUS WITH DIABETIC POLYNEUROPATHY, WITHOUT LONG-TERM CURRENT USE OF INSULIN (HCC): Primary | ICD-10-CM

## 2023-05-16 ENCOUNTER — TELEPHONE (OUTPATIENT)
Dept: FAMILY MEDICINE CLINIC | Facility: CLINIC | Age: 68
End: 2023-05-16

## 2023-05-16 RX ORDER — PEN NEEDLE, DIABETIC 32GX 5/32"
NEEDLE, DISPOSABLE MISCELLANEOUS
Qty: 100 EACH | Refills: 5 | Status: SHIPPED | OUTPATIENT
Start: 2023-05-16

## 2023-05-16 NOTE — TELEPHONE ENCOUNTER
Pt called requesting a refill of Insulin Needles, Disposable, (Krupa Pen Needle) 32 gauge x 5/32\" ndle. Please advise.   *Pharmacy confirmed as [facility] [address]

## 2023-06-11 RX ORDER — INSULIN GLARGINE 100 [IU]/ML
INJECTION, SOLUTION SUBCUTANEOUS
Qty: 15 ML | Refills: 3 | Status: SHIPPED | OUTPATIENT
Start: 2023-06-11

## 2023-06-23 ENCOUNTER — CARE COORDINATION (OUTPATIENT)
Facility: CLINIC | Age: 68
End: 2023-06-23

## 2023-06-23 NOTE — CARE COORDINATION
.Ambulatory Care Coordination Note  6/23/2023    Patient Current Location:  Home: [address] [place] [address] [address] [facility] [id number]     ACM contacted the patient by telephone. No answer ACM left return call message  with contact information. No ED or Hospital admissions noted. .     Challenges to be reviewed by the provider   Additional needs identified to be addressed with provider: No  none               Method of communication with provider: none. ACM: Sharyn Zhong RN    Offered patient enrollment in the Remote Patient Monitoring (RPM) program for in-home monitoring: .    Lab Results       None            Care Coordination Interventions    Referral from Primary Care Provider: No  Suggested Interventions and Community Resources  Diabetes Education: In Process  Fall Risk Prevention: In Process (Comment: knee pains. quit PT after 6 secessions)          Goals Addressed    None         No future appointments.

## 2023-06-26 RX ORDER — AMLODIPINE BESYLATE 10 MG/1
TABLET ORAL
Qty: 90 TABLET | Refills: 1 | Status: SHIPPED | OUTPATIENT
Start: 2023-06-26

## 2023-07-21 DIAGNOSIS — E11.42 TYPE 2 DIABETES MELLITUS WITH DIABETIC POLYNEUROPATHY, WITHOUT LONG-TERM CURRENT USE OF INSULIN (HCC): Primary | ICD-10-CM

## 2023-07-21 RX ORDER — GLIPIZIDE 10 MG/1
TABLET, FILM COATED, EXTENDED RELEASE ORAL
Qty: 180 TABLET | Refills: 0 | Status: SHIPPED | OUTPATIENT
Start: 2023-07-21

## 2023-07-21 NOTE — TELEPHONE ENCOUNTER
Kenzie Carranza called for their medication refill. Last Office visit:  05/04/23    Last Filled: 04/23/23 qty 180 w/ 0 refills    Follow up visit:  No future appointments.

## 2023-07-22 DIAGNOSIS — F20.9 SCHIZOPHRENIA, UNSPECIFIED TYPE (HCC): ICD-10-CM

## 2023-07-22 DIAGNOSIS — F41.1 GENERALIZED ANXIETY DISORDER: ICD-10-CM

## 2023-07-25 RX ORDER — QUETIAPINE FUMARATE 100 MG/1
100 TABLET, FILM COATED ORAL NIGHTLY
Qty: 90 TABLET | Refills: 1 | Status: SHIPPED | OUTPATIENT
Start: 2023-07-25 | End: 2024-01-21

## 2023-07-28 ENCOUNTER — CARE COORDINATION (OUTPATIENT)
Facility: CLINIC | Age: 68
End: 2023-07-28

## 2023-07-28 NOTE — CARE COORDINATION
.Ambulatory Care Coordination Note  2023    Patient Current Location:  Home: [address] [place] [id number]     ACM contacted the patient by telephone. Verified name and  with patient as identifiers. Provided introduction to self, and explanation of the ACM role. Challenges to be reviewed by the provider   Additional needs identified to be addressed with provider: Yes  Patient asking about medication refills Dr Nkiki Goodwin refill Seroquel  . Asking about female provider Dr Sven Edmond @ [facility] is excepting patients telephone  # given to patient [phone]               Method of communication with provider: chart routing. ACM: Charlie Shafer RN    Offered patient enrollment in the Remote Patient Monitoring (RPM) program for in-home monitoring: Patient is not eligible for RPM program.    Lab Results       None            Care Coordination Interventions    Referral from Primary Care Provider: No  Suggested Interventions and Community Resources  Diabetes Education: In Process  Fall Risk Prevention: In Process (Comment: knee pains.  quit PT after 6 secessions)          Goals Addressed                   This Visit's Progress     Care Coordination Self Management   On track     CC Self Management Goal  Patient Goal (What steps will patient take to achieve goal?): >>>  Patient is able to discuss self-management of condition(s):  Pt demonstrates adherence to medications  Pt demonstrates understanding of self-monitoring  Patient is able to identify Red Flags:  Alert to potential adverse drug reactions(s) or side effects and actions to take should they arise  Discuss target symptoms and actions to take should they arise  Identify problems that require immediate PCP or specialist visit  Patient demonstrates understanding of access to PCP/Specialist:  Understands about scheduling routine Follow Up appointments   Understands about sick day appointment options for worsening of

## 2023-08-15 ENCOUNTER — HOSPITAL ENCOUNTER (OUTPATIENT)
Facility: HOSPITAL | Age: 68
Setting detail: SPECIMEN
Discharge: HOME | End: 2023-08-18
Payer: MEDICARE

## 2023-08-15 ENCOUNTER — OFFICE VISIT (OUTPATIENT)
Facility: CLINIC | Age: 68
End: 2023-08-15
Payer: MEDICARE

## 2023-08-15 VITALS
HEART RATE: 94 BPM | SYSTOLIC BLOOD PRESSURE: 122 MMHG | RESPIRATION RATE: 16 BRPM | BODY MASS INDEX: 33.99 KG/M2 | TEMPERATURE: 97.6 F | HEIGHT: 65 IN | OXYGEN SATURATION: 98 % | DIASTOLIC BLOOD PRESSURE: 74 MMHG | WEIGHT: 204 LBS

## 2023-08-15 DIAGNOSIS — E78.2 MIXED HYPERLIPIDEMIA: ICD-10-CM

## 2023-08-15 DIAGNOSIS — H40.2230 CHRONIC PRIMARY ANGLE-CLOSURE GLAUCOMA OF BOTH EYES, UNSPECIFIED GLAUCOMA STAGE: ICD-10-CM

## 2023-08-15 DIAGNOSIS — I10 PRIMARY HYPERTENSION: ICD-10-CM

## 2023-08-15 DIAGNOSIS — E61.1 IRON DEFICIENCY: ICD-10-CM

## 2023-08-15 DIAGNOSIS — Z72.0 TOBACCO USE: ICD-10-CM

## 2023-08-15 DIAGNOSIS — Z12.31 ENCOUNTER FOR SCREENING MAMMOGRAM FOR MALIGNANT NEOPLASM OF BREAST: ICD-10-CM

## 2023-08-15 DIAGNOSIS — Z86.73 HISTORY OF CVA IN ADULTHOOD: ICD-10-CM

## 2023-08-15 DIAGNOSIS — Z79.4 CONTROLLED TYPE 2 DIABETES MELLITUS WITH COMPLICATION, WITH LONG-TERM CURRENT USE OF INSULIN (HCC): ICD-10-CM

## 2023-08-15 DIAGNOSIS — Z76.89 ENCOUNTER TO ESTABLISH CARE: Primary | ICD-10-CM

## 2023-08-15 DIAGNOSIS — Z62.810 HX OF SEXUAL MOLESTATION IN CHILDHOOD: ICD-10-CM

## 2023-08-15 DIAGNOSIS — F20.9 SCHIZOPHRENIA, UNSPECIFIED TYPE (HCC): ICD-10-CM

## 2023-08-15 DIAGNOSIS — K21.9 GASTROESOPHAGEAL REFLUX DISEASE, UNSPECIFIED WHETHER ESOPHAGITIS PRESENT: ICD-10-CM

## 2023-08-15 DIAGNOSIS — E11.8 CONTROLLED TYPE 2 DIABETES MELLITUS WITH COMPLICATION, WITH LONG-TERM CURRENT USE OF INSULIN (HCC): ICD-10-CM

## 2023-08-15 DIAGNOSIS — G47.33 OSA (OBSTRUCTIVE SLEEP APNEA): ICD-10-CM

## 2023-08-15 PROBLEM — E11.9 TYPE 2 DIABETES MELLITUS (HCC): Status: RESOLVED | Noted: 2022-05-02 | Resolved: 2023-08-15

## 2023-08-15 PROBLEM — E66.811 CLASS 1 OBESITY DUE TO EXCESS CALORIES WITH SERIOUS COMORBIDITY AND BODY MASS INDEX (BMI) OF 33.0 TO 33.9 IN ADULT: Status: ACTIVE | Noted: 2018-05-07

## 2023-08-15 PROBLEM — E66.09 CLASS 1 OBESITY DUE TO EXCESS CALORIES WITH SERIOUS COMORBIDITY AND BODY MASS INDEX (BMI) OF 33.0 TO 33.9 IN ADULT: Status: ACTIVE | Noted: 2018-05-07

## 2023-08-15 PROBLEM — N18.30 CHRONIC RENAL DISEASE, STAGE III (HCC): Status: RESOLVED | Noted: 2022-06-20 | Resolved: 2023-08-15

## 2023-08-15 PROBLEM — E66.9 OBESITY (BMI 30-39.9): Status: RESOLVED | Noted: 2018-07-16 | Resolved: 2023-08-15

## 2023-08-15 LAB
FERRITIN SERPL-MCNC: 132 NG/ML (ref 8–388)
IRON SATN MFR SERPL: 10 % (ref 20–50)
IRON SERPL-MCNC: 33 UG/DL (ref 50–175)
TIBC SERPL-MCNC: 323 UG/DL (ref 250–450)
TSH SERPL DL<=0.05 MIU/L-ACNC: 1.72 UIU/ML (ref 0.36–3.74)

## 2023-08-15 PROCEDURE — 3074F SYST BP LT 130 MM HG: CPT | Performed by: FAMILY MEDICINE

## 2023-08-15 PROCEDURE — 3078F DIAST BP <80 MM HG: CPT | Performed by: FAMILY MEDICINE

## 2023-08-15 PROCEDURE — 82728 ASSAY OF FERRITIN: CPT

## 2023-08-15 PROCEDURE — 1123F ACP DISCUSS/DSCN MKR DOCD: CPT | Performed by: FAMILY MEDICINE

## 2023-08-15 PROCEDURE — 83550 IRON BINDING TEST: CPT

## 2023-08-15 PROCEDURE — 84443 ASSAY THYROID STIM HORMONE: CPT

## 2023-08-15 PROCEDURE — 83540 ASSAY OF IRON: CPT

## 2023-08-15 PROCEDURE — 3044F HG A1C LEVEL LT 7.0%: CPT | Performed by: FAMILY MEDICINE

## 2023-08-15 PROCEDURE — 99204 OFFICE O/P NEW MOD 45 MIN: CPT | Performed by: FAMILY MEDICINE

## 2023-08-15 RX ORDER — TELMISARTAN 40 MG/1
40 TABLET ORAL DAILY
Qty: 90 TABLET | Refills: 0 | Status: SHIPPED | OUTPATIENT
Start: 2023-08-15

## 2023-08-15 SDOH — ECONOMIC STABILITY: FOOD INSECURITY: WITHIN THE PAST 12 MONTHS, THE FOOD YOU BOUGHT JUST DIDN'T LAST AND YOU DIDN'T HAVE MONEY TO GET MORE.: NEVER TRUE

## 2023-08-15 SDOH — ECONOMIC STABILITY: FOOD INSECURITY: WITHIN THE PAST 12 MONTHS, YOU WORRIED THAT YOUR FOOD WOULD RUN OUT BEFORE YOU GOT MONEY TO BUY MORE.: NEVER TRUE

## 2023-08-15 SDOH — ECONOMIC STABILITY: HOUSING INSECURITY
IN THE LAST 12 MONTHS, WAS THERE A TIME WHEN YOU DID NOT HAVE A STEADY PLACE TO SLEEP OR SLEPT IN A SHELTER (INCLUDING NOW)?: NO

## 2023-08-15 SDOH — ECONOMIC STABILITY: INCOME INSECURITY: HOW HARD IS IT FOR YOU TO PAY FOR THE VERY BASICS LIKE FOOD, HOUSING, MEDICAL CARE, AND HEATING?: NOT HARD AT ALL

## 2023-08-15 ASSESSMENT — PATIENT HEALTH QUESTIONNAIRE - PHQ9
2. FEELING DOWN, DEPRESSED OR HOPELESS: 0
SUM OF ALL RESPONSES TO PHQ QUESTIONS 1-9: 0
SUM OF ALL RESPONSES TO PHQ QUESTIONS 1-9: 0
SUM OF ALL RESPONSES TO PHQ9 QUESTIONS 1 & 2: 0
1. LITTLE INTEREST OR PLEASURE IN DOING THINGS: 0
SUM OF ALL RESPONSES TO PHQ QUESTIONS 1-9: 0
SUM OF ALL RESPONSES TO PHQ QUESTIONS 1-9: 0

## 2023-08-15 ASSESSMENT — ENCOUNTER SYMPTOMS
RHINORRHEA: 0
DIARRHEA: 0
VOMITING: 0
COUGH: 0
NAUSEA: 0
SHORTNESS OF BREATH: 0
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
## 2023-08-28 ENCOUNTER — CARE COORDINATION (OUTPATIENT)
Facility: CLINIC | Age: 68
End: 2023-08-28

## 2023-08-28 NOTE — CARE COORDINATION
.Attempted to contact patient for Complex Care follow up. No answer, left message with contact information provided. Will attempt to contact at a later time patient did keep new patient appointment with new PCP TWILA Dennison on 8/15/23.  No ED or hospital admission noted

## 2023-08-29 ENCOUNTER — TELEPHONE (OUTPATIENT)
Facility: CLINIC | Age: 68
End: 2023-08-29

## 2023-08-29 DIAGNOSIS — Z12.11 SCREEN FOR COLON CANCER: ICD-10-CM

## 2023-08-29 DIAGNOSIS — E61.1 IRON DEFICIENCY: Primary | ICD-10-CM

## 2023-08-29 NOTE — TELEPHONE ENCOUNTER
Please advise pt that iron levels are still LOW; recommend colon cancer screening given iron deficiency; had stool test last year but best test is colonoscopy    Recommend she take iron supplement with vitamin C to help it absorb better and repeat labs in 3 months

## 2023-09-15 ENCOUNTER — OFFICE VISIT (OUTPATIENT)
Facility: CLINIC | Age: 68
End: 2023-09-15
Payer: MEDICARE

## 2023-09-15 VITALS
HEIGHT: 65 IN | DIASTOLIC BLOOD PRESSURE: 85 MMHG | RESPIRATION RATE: 16 BRPM | BODY MASS INDEX: 34.99 KG/M2 | TEMPERATURE: 98.1 F | HEART RATE: 88 BPM | SYSTOLIC BLOOD PRESSURE: 135 MMHG | WEIGHT: 210 LBS | OXYGEN SATURATION: 97 %

## 2023-09-15 DIAGNOSIS — I10 ESSENTIAL HYPERTENSION: ICD-10-CM

## 2023-09-15 DIAGNOSIS — E61.1 IRON DEFICIENCY: Primary | ICD-10-CM

## 2023-09-15 DIAGNOSIS — D21.9 ANGIOLEIOMYOMA: ICD-10-CM

## 2023-09-15 DIAGNOSIS — F20.9 SCHIZOPHRENIA, UNSPECIFIED TYPE (HCC): ICD-10-CM

## 2023-09-15 DIAGNOSIS — Z28.21 PNEUMOCOCCAL VACCINATION DECLINED: ICD-10-CM

## 2023-09-15 DIAGNOSIS — Z12.31 ENCOUNTER FOR SCREENING MAMMOGRAM FOR MALIGNANT NEOPLASM OF BREAST: ICD-10-CM

## 2023-09-15 DIAGNOSIS — I10 PRIMARY HYPERTENSION: ICD-10-CM

## 2023-09-15 PROCEDURE — 3079F DIAST BP 80-89 MM HG: CPT | Performed by: FAMILY MEDICINE

## 2023-09-15 PROCEDURE — 1123F ACP DISCUSS/DSCN MKR DOCD: CPT | Performed by: FAMILY MEDICINE

## 2023-09-15 PROCEDURE — 3075F SYST BP GE 130 - 139MM HG: CPT | Performed by: FAMILY MEDICINE

## 2023-09-15 PROCEDURE — 99214 OFFICE O/P EST MOD 30 MIN: CPT | Performed by: FAMILY MEDICINE

## 2023-09-15 RX ORDER — FERROUS SULFATE 325(65) MG
325 TABLET ORAL 2 TIMES DAILY
COMMUNITY

## 2023-09-15 RX ORDER — TELMISARTAN 40 MG/1
40 TABLET ORAL DAILY
Qty: 90 TABLET | Refills: 2 | Status: SHIPPED | OUTPATIENT
Start: 2023-09-15

## 2023-09-15 ASSESSMENT — ENCOUNTER SYMPTOMS
VOMITING: 0
COUGH: 0
SHORTNESS OF BREATH: 0
DIARRHEA: 0
RHINORRHEA: 0
NAUSEA: 0

## 2023-09-15 ASSESSMENT — PATIENT HEALTH QUESTIONNAIRE - PHQ9
2. FEELING DOWN, DEPRESSED OR HOPELESS: 0
SUM OF ALL RESPONSES TO PHQ QUESTIONS 1-9: 5
5. POOR APPETITE OR OVEREATING: 0
SUM OF ALL RESPONSES TO PHQ9 QUESTIONS 1 & 2: 1
4. FEELING TIRED OR HAVING LITTLE ENERGY: 1
6. FEELING BAD ABOUT YOURSELF - OR THAT YOU ARE A FAILURE OR HAVE LET YOURSELF OR YOUR FAMILY DOWN: 1
8. MOVING OR SPEAKING SO SLOWLY THAT OTHER PEOPLE COULD HAVE NOTICED. OR THE OPPOSITE, BEING SO FIGETY OR RESTLESS THAT YOU HAVE BEEN MOVING AROUND A LOT MORE THAN USUAL: 1
SUM OF ALL RESPONSES TO PHQ QUESTIONS 1-9: 5
10. IF YOU CHECKED OFF ANY PROBLEMS, HOW DIFFICULT HAVE THESE PROBLEMS MADE IT FOR YOU TO DO YOUR WORK, TAKE CARE OF THINGS AT HOME, OR GET ALONG WITH OTHER PEOPLE: 1
SUM OF ALL RESPONSES TO PHQ QUESTIONS 1-9: 5
7. TROUBLE CONCENTRATING ON THINGS, SUCH AS READING THE NEWSPAPER OR WATCHING TELEVISION: 0
SUM OF ALL RESPONSES TO PHQ QUESTIONS 1-9: 5
9. THOUGHTS THAT YOU WOULD BE BETTER OFF DEAD, OR OF HURTING YOURSELF: 0
1. LITTLE INTEREST OR PLEASURE IN DOING THINGS: 1
3. TROUBLE FALLING OR STAYING ASLEEP: 1

## 2023-09-15 NOTE — PROGRESS NOTES
patients over 45: Has the patient had a colonoscopy? Yes - no Care Gap present     If the patient is female:    4. For patients over 40: Has the patient had a mammogram? Yes - no Care Gap present    5. For patients over 21: Has the patient had a pap smear?  Yes - no Care Gap present
normal.   Cardiovascular:      Rate and Rhythm: Normal rate and regular rhythm. Heart sounds: Normal heart sounds. No murmur heard. No friction rub. No gallop. Pulmonary:      Effort: Pulmonary effort is normal. No respiratory distress. Breath sounds: Normal breath sounds. No stridor. No wheezing, rhonchi or rales. Musculoskeletal:      Cervical back: Normal range of motion. Right lower leg: No edema. Left lower leg: No edema. Neurological:      Mental Status: She is alert and oriented to person, place, and time. Gait: Gait normal.   Psychiatric:         Mood and Affect: Mood normal.         Behavior: Behavior normal.         Thought Content: Thought content normal.         Judgment: Judgment normal.         PHQ-9 Total Score: 5 (9/15/2023 11:34 AM)  Thoughts that you would be better off dead, or of hurting yourself in some way: 0 (9/15/2023 11:34 AM)            I have discussed the diagnosis with the patient and the intended plan as seen in the above orders. The patient has received an After-Visit Summary and questions were answered concerning future plans. An After Visit Summary was printed and given to the patient. All diagnoses have been discussed with the patient and all of the patient's questions have been answered.            Brandin Lopez MD  [address] [place] [address] Dr Group   [address] [place] [address] Pauline Ma

## 2023-09-22 ENCOUNTER — TELEPHONE (OUTPATIENT)
Facility: CLINIC | Age: 68
End: 2023-09-22

## 2023-09-22 DIAGNOSIS — E11.42 TYPE 2 DIABETES MELLITUS WITH DIABETIC POLYNEUROPATHY, WITHOUT LONG-TERM CURRENT USE OF INSULIN (HCC): ICD-10-CM

## 2023-09-22 DIAGNOSIS — E78.2 MIXED HYPERLIPIDEMIA: ICD-10-CM

## 2023-09-22 RX ORDER — INSULIN GLARGINE 100 [IU]/ML
INJECTION, SOLUTION SUBCUTANEOUS
Qty: 15 ML | Refills: 5 | Status: SHIPPED | OUTPATIENT
Start: 2023-09-22

## 2023-09-22 RX ORDER — LANCETS 30 GAUGE
EACH MISCELLANEOUS
Qty: 200 EACH | Refills: 3 | Status: SHIPPED | OUTPATIENT
Start: 2023-09-22

## 2023-09-22 RX ORDER — BLOOD SUGAR DIAGNOSTIC
STRIP MISCELLANEOUS
Qty: 200 EACH | Refills: 3 | Status: SHIPPED | OUTPATIENT
Start: 2023-09-22

## 2023-09-22 RX ORDER — SIMVASTATIN 10 MG
10 TABLET ORAL NIGHTLY
Qty: 90 TABLET | Refills: 2 | Status: SHIPPED | OUTPATIENT
Start: 2023-09-22

## 2023-09-22 RX ORDER — GLUCOSAMINE HCL/CHONDROITIN SU 500-400 MG
CAPSULE ORAL
Qty: 200 STRIP | Refills: 3 | Status: SHIPPED | OUTPATIENT
Start: 2023-09-22

## 2023-09-22 RX ORDER — GLIPIZIDE 10 MG/1
10 TABLET, FILM COATED, EXTENDED RELEASE ORAL 2 TIMES DAILY
Qty: 180 TABLET | Refills: 2 | Status: SHIPPED | OUTPATIENT
Start: 2023-09-22

## 2023-09-22 NOTE — TELEPHONE ENCOUNTER
Received after hours call; pt had been giving herself insulin and not testing her sugars prior; moved into motel and has her items in boxes;  I stressed importance of checking sugars to prevent hypoglycemia;  refill provided on meds; states protonix discontinued by another provider

## 2023-10-09 ENCOUNTER — TELEPHONE (OUTPATIENT)
Facility: CLINIC | Age: 68
End: 2023-10-09

## 2023-10-10 RX ORDER — PANTOPRAZOLE SODIUM 40 MG/1
40 FOR SUSPENSION ORAL
COMMUNITY
End: 2023-10-10 | Stop reason: SDUPTHER

## 2023-10-10 RX ORDER — PANTOPRAZOLE SODIUM 40 MG/1
40 FOR SUSPENSION ORAL
Qty: 30 EACH | Refills: 2 | Status: CANCELLED | OUTPATIENT
Start: 2023-10-10

## 2023-10-10 RX ORDER — PANTOPRAZOLE SODIUM 40 MG/1
40 TABLET, DELAYED RELEASE ORAL
Qty: 90 TABLET | Refills: 2 | Status: SHIPPED | OUTPATIENT
Start: 2023-10-10

## 2023-10-10 NOTE — TELEPHONE ENCOUNTER
Please advise pt Dr. Too Causey sent 6 month supply in July; she needs to call pharmacy to  her prescription

## 2023-11-01 ENCOUNTER — CARE COORDINATION (OUTPATIENT)
Facility: CLINIC | Age: 68
End: 2023-11-01

## 2023-11-01 SDOH — ECONOMIC STABILITY: HOUSING INSECURITY: IN THE LAST 12 MONTHS, HOW MANY PLACES HAVE YOU LIVED?: 2

## 2023-11-01 SDOH — ECONOMIC STABILITY: TRANSPORTATION INSECURITY
IN THE PAST 12 MONTHS, HAS LACK OF TRANSPORTATION KEPT YOU FROM MEETINGS, WORK, OR FROM GETTING THINGS NEEDED FOR DAILY LIVING?: NO

## 2023-11-01 SDOH — ECONOMIC STABILITY: TRANSPORTATION INSECURITY
IN THE PAST 12 MONTHS, HAS THE LACK OF TRANSPORTATION KEPT YOU FROM MEDICAL APPOINTMENTS OR FROM GETTING MEDICATIONS?: NO

## 2023-11-01 SDOH — HEALTH STABILITY: PHYSICAL HEALTH: ON AVERAGE, HOW MANY DAYS PER WEEK DO YOU ENGAGE IN MODERATE TO STRENUOUS EXERCISE (LIKE A BRISK WALK)?: 1 DAY

## 2023-11-01 SDOH — ECONOMIC STABILITY: INCOME INSECURITY: IN THE LAST 12 MONTHS, WAS THERE A TIME WHEN YOU WERE NOT ABLE TO PAY THE MORTGAGE OR RENT ON TIME?: NO

## 2023-11-01 SDOH — HEALTH STABILITY: PHYSICAL HEALTH: ON AVERAGE, HOW MANY MINUTES DO YOU ENGAGE IN EXERCISE AT THIS LEVEL?: 10 MIN

## 2023-11-01 ASSESSMENT — PATIENT HEALTH QUESTIONNAIRE - PHQ9
7. TROUBLE CONCENTRATING ON THINGS, SUCH AS READING THE NEWSPAPER OR WATCHING TELEVISION: 1
SUM OF ALL RESPONSES TO PHQ9 QUESTIONS 1 & 2: 3
SUM OF ALL RESPONSES TO PHQ QUESTIONS 1-9: 8
SUM OF ALL RESPONSES TO PHQ QUESTIONS 1-9: 8
4. FEELING TIRED OR HAVING LITTLE ENERGY: 1
8. MOVING OR SPEAKING SO SLOWLY THAT OTHER PEOPLE COULD HAVE NOTICED. OR THE OPPOSITE, BEING SO FIGETY OR RESTLESS THAT YOU HAVE BEEN MOVING AROUND A LOT MORE THAN USUAL: 1
10. IF YOU CHECKED OFF ANY PROBLEMS, HOW DIFFICULT HAVE THESE PROBLEMS MADE IT FOR YOU TO DO YOUR WORK, TAKE CARE OF THINGS AT HOME, OR GET ALONG WITH OTHER PEOPLE: 1
5. POOR APPETITE OR OVEREATING: 1
9. THOUGHTS THAT YOU WOULD BE BETTER OFF DEAD, OR OF HURTING YOURSELF: 0
SUM OF ALL RESPONSES TO PHQ QUESTIONS 1-9: 8
1. LITTLE INTEREST OR PLEASURE IN DOING THINGS: 2
3. TROUBLE FALLING OR STAYING ASLEEP: 1
SUM OF ALL RESPONSES TO PHQ QUESTIONS 1-9: 8
2. FEELING DOWN, DEPRESSED OR HOPELESS: 1

## 2023-11-01 ASSESSMENT — SOCIAL DETERMINANTS OF HEALTH (SDOH)
HOW OFTEN DO YOU ATTENT MEETINGS OF THE CLUB OR ORGANIZATION YOU BELONG TO?: 1 TO 4 TIMES PER YEAR
IN A TYPICAL WEEK, HOW MANY TIMES DO YOU TALK ON THE PHONE WITH FAMILY, FRIENDS, OR NEIGHBORS?: MORE THAN THREE TIMES A WEEK
DO YOU BELONG TO ANY CLUBS OR ORGANIZATIONS SUCH AS CHURCH GROUPS UNIONS, FRATERNAL OR ATHLETIC GROUPS, OR SCHOOL GROUPS?: NO
WITHIN THE LAST YEAR, HAVE YOU BEEN KICKED, HIT, SLAPPED, OR OTHERWISE PHYSICALLY HURT BY YOUR PARTNER OR EX-PARTNER?: NO
WITHIN THE LAST YEAR, HAVE TO BEEN RAPED OR FORCED TO HAVE ANY KIND OF SEXUAL ACTIVITY BY YOUR PARTNER OR EX-PARTNER?: NO
WITHIN THE LAST YEAR, HAVE YOU BEEN HUMILIATED OR EMOTIONALLY ABUSED IN OTHER WAYS BY YOUR PARTNER OR EX-PARTNER?: NO
HOW OFTEN DO YOU GET TOGETHER WITH FRIENDS OR RELATIVES?: MORE THAN THREE TIMES A WEEK
WITHIN THE LAST YEAR, HAVE YOU BEEN AFRAID OF YOUR PARTNER OR EX-PARTNER?: NO
HOW OFTEN DO YOU ATTEND CHURCH OR RELIGIOUS SERVICES?: MORE THAN 4 TIMES PER YEAR

## 2023-11-08 ENCOUNTER — CARE COORDINATION (OUTPATIENT)
Facility: CLINIC | Age: 68
End: 2023-11-08

## 2023-11-08 NOTE — CARE COORDINATION
.Attempted to contact patient for [facility] ( High Risk Case Management ). No answer, left message with contact information provided. Will attempt to contact at a later time . EMR reviewed No Ed or hospital admissions noted.

## 2023-11-15 ENCOUNTER — CARE COORDINATION (OUTPATIENT)
Facility: CLINIC | Age: 68
End: 2023-11-15

## 2023-11-15 NOTE — CARE COORDINATION
.Patient has graduated from the Complex Care program on 11/15/23. Patient/family has the ability to self-manage at this time. Care management goals have been completed. No further Ambulatory Care Manager follow up scheduled. Goals Addressed                   This Visit's Progress     COMPLETED: Care Coordination Self Management   On track     CC Self Management Goal  Patient Goal (What steps will patient take to achieve goal?): >>>  Patient is able to discuss self-management of condition(s):  Pt demonstrates adherence to medications  Pt demonstrates understanding of self-monitoring  Patient is able to identify Red Flags:  Alert to potential adverse drug reactions(s) or side effects and actions to take should they arise  Discuss target symptoms and actions to take should they arise  Identify problems that require immediate PCP or specialist visit  Patient demonstrates understanding of access to PCP/Specialist:  Understands about scheduling routine Follow Up appointments   Understands about sick day appointment options for worsening of symptoms/progression (Same Day, E Visits)  7/28/23  Ongoing       COMPLETED: Follows diet recommendations and achieves/maintains goal weight   On track     Exercise as tolerated   7/28/23  Voicing as tolerated it's been too hot outside. She walks around the grocery stores       COMPLETED: Take all medications as ordered   On track     Patient will take all medications as prescribed. 7/28/23  Patient calls for her refills on time       COMPLETED: To decrease or maintain patient's biometrics:  HgA1c < ?7 mg/dL, /80 or less. LDL of less than 100 and/or less than 70 in a patient with CAD. On track     Maintain A1c < 7.0 ( 6.5 ( 5/4)  7/28/23  No a1c pending. Doing well blood glucose averaging 102-126              Patient has Ambulatory Care Manager's contact information for any further questions, concerns, or needs.   Patients upcoming visits:    Future Appointments   Date

## 2023-11-27 ENCOUNTER — TELEPHONE (OUTPATIENT)
Facility: CLINIC | Age: 68
End: 2023-11-27

## 2023-11-27 NOTE — TELEPHONE ENCOUNTER
Please contact pt to advise her to schedule her mammogram overdue    Call [phone] for mammogram scheduling or she can perform as walk-in at [address] [place] [address]

## 2023-11-29 ENCOUNTER — TELEPHONE (OUTPATIENT)
Dept: PHARMACY | Facility: CLINIC | Age: 68
End: 2023-11-29

## 2023-11-29 NOTE — TELEPHONE ENCOUNTER
[facility] CLINICAL PHARMACY: ADHERENCE REVIEW  Identified care gap per [employer]: fills at [facility]: ACE/ARB adherence    Patient also appears to be prescribed: Statin    ASSESSMENT    ACE/ARB ADHERENCE    Insurance Records claims through  23  (Prior Year [address] = not reported; YTD [address] = FIRST FILL; Potential Fail Date: 12.10.23):   TELMISARTAN TAB 40 MG last filled on 08.15.23 for 90 day supply. Next refill due: 23    Prescribed si tablet/capsule daily    Per Insurer Portal: last filled on 23 for 90 day supply per pt's request    Per [facility]: last picked up on 23 for 90 day supply. Billed through [employer] [place] [employer] 2 refills remaining. BP Readings from Last 3 Encounters:   09/15/23 135/85   08/15/23 122/74   23 116/70     CrCl cannot be calculated (Patient's most recent lab result is older than the maximum 180 days allowed. ). Lab Results   Component Value Date    CREATININE 0.85 2023     Lab Results   Component Value Date    K 4.2 2023 [employer] Records claims through  23  (Prior Year [address] = not reported; YTD PDC = 98% - PASSED):   SIMVASTATIN TAB 10 MG last filled on 23 for 90 day supply. Next refill due: 23    Prescribed si tablet/capsule daily    Per Insurer Portal: last filled on 23 for 90 day supply. Per [facility]: last picked up on 23 for 90 day supply. Billed through [employer] [place] 3 refills remaining. Lab Results   Component Value Date    CHOL 147 2022    TRIG 123 2022    HDL 57 2022    LDLCALC 65.4 2022     ALT   Date Value Ref Range Status   2023 32 13 - 56 U/L Final     AST   Date Value Ref Range Status   2023 15 10 - 38 U/L Final     The ASCVD Risk score (Tylor DK, et al., 2019) failed to calculate for the following reasons:     The patient has a prior MI or stroke diagnosis     PLAN    Per insurer report, LIS-1 - may be able to receive up to a

## 2023-12-07 ENCOUNTER — TELEPHONE (OUTPATIENT)
Facility: CLINIC | Age: 68
End: 2023-12-07

## 2023-12-07 DIAGNOSIS — Z79.4 CONTROLLED TYPE 2 DIABETES MELLITUS WITH COMPLICATION, WITH LONG-TERM CURRENT USE OF INSULIN (HCC): Primary | ICD-10-CM

## 2023-12-07 DIAGNOSIS — E61.1 IRON DEFICIENCY: ICD-10-CM

## 2023-12-07 DIAGNOSIS — E11.8 CONTROLLED TYPE 2 DIABETES MELLITUS WITH COMPLICATION, WITH LONG-TERM CURRENT USE OF INSULIN (HCC): Primary | ICD-10-CM

## 2023-12-07 NOTE — TELEPHONE ENCOUNTER
Please schedule pt for labs before her pending appt 12/15; lab is scheduled 12/18 and should be completed at least 4 days before her appointment with me    Also please remind her to have her mammogram completed

## 2023-12-11 ENCOUNTER — HOSPITAL ENCOUNTER (OUTPATIENT)
Facility: HOSPITAL | Age: 68
Setting detail: SPECIMEN
Discharge: HOME | End: 2023-12-14
Payer: MEDICARE

## 2023-12-11 LAB
ALBUMIN SERPL-MCNC: 3.8 G/DL (ref 3.4–5)
ALBUMIN/GLOB SERPL: 1.1 (ref 0.8–1.7)
ALP SERPL-CCNC: 103 U/L (ref 45–117)
ALT SERPL-CCNC: 26 U/L (ref 13–56)
ANION GAP SERPL CALC-SCNC: 3 MMOL/L (ref 3–18)
APPEARANCE UR: CLEAR
AST SERPL-CCNC: 15 U/L (ref 10–38)
BACTERIA URNS QL MICRO: ABNORMAL /HPF
BASOPHILS # BLD: 0.1 K/UL (ref 0–0.1)
BASOPHILS NFR BLD: 1 % (ref 0–2)
BILIRUB SERPL-MCNC: 0.2 MG/DL (ref 0.2–1)
BILIRUB UR QL: NEGATIVE
BUN SERPL-MCNC: 11 MG/DL (ref 7–18)
BUN/CREAT SERPL: 13 (ref 12–20)
CALCIUM SERPL-MCNC: 9.3 MG/DL (ref 8.5–10.1)
CHLORIDE SERPL-SCNC: 106 MMOL/L (ref 100–111)
CHOLEST SERPL-MCNC: 156 MG/DL
CO2 SERPL-SCNC: 30 MMOL/L (ref 21–32)
COLOR UR: YELLOW
CREAT SERPL-MCNC: 0.85 MG/DL (ref 0.6–1.3)
CREAT UR-MCNC: 190 MG/DL (ref 30–125)
DIFFERENTIAL METHOD BLD: ABNORMAL
EOSINOPHIL # BLD: 0.3 K/UL (ref 0–0.4)
EOSINOPHIL NFR BLD: 3 % (ref 0–5)
EPITH CASTS URNS QL MICRO: ABNORMAL /LPF (ref 0–5)
ERYTHROCYTE [DISTWIDTH] IN BLOOD BY AUTOMATED COUNT: 12.3 % (ref 11.6–14.5)
EST. AVERAGE GLUCOSE BLD GHB EST-MCNC: 134 MG/DL
FERRITIN SERPL-MCNC: 100 NG/ML (ref 8–388)
GLOBULIN SER CALC-MCNC: 3.4 G/DL (ref 2–4)
GLUCOSE SERPL-MCNC: 155 MG/DL (ref 74–99)
GLUCOSE UR STRIP.AUTO-MCNC: NEGATIVE MG/DL
HBA1C MFR BLD: 6.3 % (ref 4.2–5.6)
HCT VFR BLD AUTO: 39.9 % (ref 35–45)
HDLC SERPL-MCNC: 52 MG/DL (ref 40–60)
HDLC SERPL: 3 (ref 0–5)
HGB BLD-MCNC: 12.6 G/DL (ref 12–16)
HGB UR QL STRIP: NEGATIVE
IMM GRANULOCYTES # BLD AUTO: 0 K/UL (ref 0–0.04)
IMM GRANULOCYTES NFR BLD AUTO: 0 % (ref 0–0.5)
IRON SATN MFR SERPL: 17 % (ref 20–50)
IRON SERPL-MCNC: 55 UG/DL (ref 50–175)
KETONES UR QL STRIP.AUTO: NEGATIVE MG/DL
LDLC SERPL CALC-MCNC: 66.6 MG/DL (ref 0–100)
LEUKOCYTE ESTERASE UR QL STRIP.AUTO: ABNORMAL
LIPID PANEL: ABNORMAL
LYMPHOCYTES # BLD: 4 K/UL (ref 0.9–3.6)
LYMPHOCYTES NFR BLD: 38 % (ref 21–52)
MCH RBC QN AUTO: 30.9 PG (ref 24–34)
MCHC RBC AUTO-ENTMCNC: 31.6 G/DL (ref 31–37)
MCV RBC AUTO: 97.8 FL (ref 78–100)
MICROALBUMIN UR-MCNC: 1.67 MG/DL (ref 0–3)
MICROALBUMIN/CREAT UR-RTO: 9 MG/G (ref 0–30)
MONOCYTES # BLD: 0.8 K/UL (ref 0.05–1.2)
MONOCYTES NFR BLD: 8 % (ref 3–10)
NEUTS SEG # BLD: 5.2 K/UL (ref 1.8–8)
NEUTS SEG NFR BLD: 50 % (ref 40–73)
NITRITE UR QL STRIP.AUTO: NEGATIVE
NRBC # BLD: 0 K/UL (ref 0–0.01)
NRBC BLD-RTO: 0 PER 100 WBC
PH UR STRIP: 5.5 (ref 5–8)
PLATELET # BLD AUTO: 361 K/UL (ref 135–420)
PMV BLD AUTO: 9.6 FL (ref 9.2–11.8)
POTASSIUM SERPL-SCNC: 4.3 MMOL/L (ref 3.5–5.5)
PROT SERPL-MCNC: 7.2 G/DL (ref 6.4–8.2)
PROT UR STRIP-MCNC: NEGATIVE MG/DL
RBC # BLD AUTO: 4.08 M/UL (ref 4.2–5.3)
RBC #/AREA URNS HPF: ABNORMAL /HPF (ref 0–5)
SODIUM SERPL-SCNC: 139 MMOL/L (ref 136–145)
SP GR UR REFRACTOMETRY: 1.02 (ref 1–1.03)
TIBC SERPL-MCNC: 320 UG/DL (ref 250–450)
TRIGL SERPL-MCNC: 187 MG/DL
TSH SERPL DL<=0.05 MIU/L-ACNC: 2.56 UIU/ML (ref 0.36–3.74)
UROBILINOGEN UR QL STRIP.AUTO: 0.2 EU/DL (ref 0.2–1)
VLDLC SERPL CALC-MCNC: 37.4 MG/DL
WBC # BLD AUTO: 10.4 K/UL (ref 4.6–13.2)
WBC URNS QL MICRO: ABNORMAL /HPF (ref 0–4)

## 2023-12-11 PROCEDURE — 82728 ASSAY OF FERRITIN: CPT

## 2023-12-11 PROCEDURE — 85025 COMPLETE CBC W/AUTO DIFF WBC: CPT

## 2023-12-11 PROCEDURE — 84443 ASSAY THYROID STIM HORMONE: CPT

## 2023-12-11 PROCEDURE — 83550 IRON BINDING TEST: CPT

## 2023-12-11 PROCEDURE — 83036 HEMOGLOBIN GLYCOSYLATED A1C: CPT

## 2023-12-11 PROCEDURE — 80053 COMPREHEN METABOLIC PANEL: CPT

## 2023-12-11 PROCEDURE — 36415 COLL VENOUS BLD VENIPUNCTURE: CPT

## 2023-12-11 PROCEDURE — 82570 ASSAY OF URINE CREATININE: CPT

## 2023-12-11 PROCEDURE — 83540 ASSAY OF IRON: CPT

## 2023-12-11 PROCEDURE — 80061 LIPID PANEL: CPT

## 2023-12-11 PROCEDURE — 81001 URINALYSIS AUTO W/SCOPE: CPT

## 2023-12-11 PROCEDURE — 82043 UR ALBUMIN QUANTITATIVE: CPT

## 2023-12-15 ENCOUNTER — OFFICE VISIT (OUTPATIENT)
Facility: CLINIC | Age: 68
End: 2023-12-15
Payer: MEDICARE

## 2023-12-15 VITALS
HEART RATE: 86 BPM | TEMPERATURE: 97.9 F | HEIGHT: 65 IN | RESPIRATION RATE: 16 BRPM | SYSTOLIC BLOOD PRESSURE: 106 MMHG | DIASTOLIC BLOOD PRESSURE: 62 MMHG | WEIGHT: 204 LBS | BODY MASS INDEX: 33.99 KG/M2 | OXYGEN SATURATION: 98 %

## 2023-12-15 DIAGNOSIS — E78.1 HYPERTRIGLYCERIDEMIA: ICD-10-CM

## 2023-12-15 DIAGNOSIS — E11.42 TYPE 2 DIABETES MELLITUS WITH DIABETIC POLYNEUROPATHY, WITHOUT LONG-TERM CURRENT USE OF INSULIN (HCC): ICD-10-CM

## 2023-12-15 DIAGNOSIS — Z12.31 ENCOUNTER FOR SCREENING MAMMOGRAM FOR MALIGNANT NEOPLASM OF BREAST: ICD-10-CM

## 2023-12-15 DIAGNOSIS — K62.5 RECTAL BLEEDING: ICD-10-CM

## 2023-12-15 DIAGNOSIS — D72.820 LYMPHOCYTOSIS: ICD-10-CM

## 2023-12-15 DIAGNOSIS — E55.9 VITAMIN D DEFICIENCY: ICD-10-CM

## 2023-12-15 DIAGNOSIS — Z79.4 CONTROLLED TYPE 2 DIABETES MELLITUS WITH COMPLICATION, WITH LONG-TERM CURRENT USE OF INSULIN (HCC): Primary | ICD-10-CM

## 2023-12-15 DIAGNOSIS — E61.1 IRON DEFICIENCY: ICD-10-CM

## 2023-12-15 DIAGNOSIS — E11.8 CONTROLLED TYPE 2 DIABETES MELLITUS WITH COMPLICATION, WITH LONG-TERM CURRENT USE OF INSULIN (HCC): Primary | ICD-10-CM

## 2023-12-15 PROCEDURE — 99214 OFFICE O/P EST MOD 30 MIN: CPT | Performed by: FAMILY MEDICINE

## 2023-12-15 PROCEDURE — 3044F HG A1C LEVEL LT 7.0%: CPT | Performed by: FAMILY MEDICINE

## 2023-12-15 PROCEDURE — 2022F DILAT RTA XM EVC RTNOPTHY: CPT | Performed by: FAMILY MEDICINE

## 2023-12-15 PROCEDURE — G8427 DOCREV CUR MEDS BY ELIG CLIN: HCPCS | Performed by: FAMILY MEDICINE

## 2023-12-15 PROCEDURE — 4004F PT TOBACCO SCREEN RCVD TLK: CPT | Performed by: FAMILY MEDICINE

## 2023-12-15 PROCEDURE — 1090F PRES/ABSN URINE INCON ASSESS: CPT | Performed by: FAMILY MEDICINE

## 2023-12-15 PROCEDURE — G8484 FLU IMMUNIZE NO ADMIN: HCPCS | Performed by: FAMILY MEDICINE

## 2023-12-15 PROCEDURE — G8399 PT W/DXA RESULTS DOCUMENT: HCPCS | Performed by: FAMILY MEDICINE

## 2023-12-15 PROCEDURE — 3074F SYST BP LT 130 MM HG: CPT | Performed by: FAMILY MEDICINE

## 2023-12-15 PROCEDURE — 1123F ACP DISCUSS/DSCN MKR DOCD: CPT | Performed by: FAMILY MEDICINE

## 2023-12-15 PROCEDURE — 3017F COLORECTAL CA SCREEN DOC REV: CPT | Performed by: FAMILY MEDICINE

## 2023-12-15 PROCEDURE — 3078F DIAST BP <80 MM HG: CPT | Performed by: FAMILY MEDICINE

## 2023-12-15 PROCEDURE — G8417 CALC BMI ABV UP PARAM F/U: HCPCS | Performed by: FAMILY MEDICINE

## 2023-12-15 RX ORDER — ACETAMINOPHEN 160 MG
TABLET,DISINTEGRATING ORAL
Qty: 90 CAPSULE | Refills: 3 | Status: SHIPPED | OUTPATIENT
Start: 2023-12-15

## 2023-12-15 RX ORDER — INSULIN GLARGINE 100 [IU]/ML
INJECTION, SOLUTION SUBCUTANEOUS
Qty: 15 ML | Refills: 5 | Status: SHIPPED | OUTPATIENT
Start: 2023-12-15

## 2023-12-15 SDOH — ECONOMIC STABILITY: FOOD INSECURITY: WITHIN THE PAST 12 MONTHS, THE FOOD YOU BOUGHT JUST DIDN'T LAST AND YOU DIDN'T HAVE MONEY TO GET MORE.: NEVER TRUE

## 2023-12-15 SDOH — ECONOMIC STABILITY: FOOD INSECURITY: WITHIN THE PAST 12 MONTHS, YOU WORRIED THAT YOUR FOOD WOULD RUN OUT BEFORE YOU GOT MONEY TO BUY MORE.: NEVER TRUE

## 2023-12-15 SDOH — ECONOMIC STABILITY: INCOME INSECURITY: HOW HARD IS IT FOR YOU TO PAY FOR THE VERY BASICS LIKE FOOD, HOUSING, MEDICAL CARE, AND HEATING?: NOT HARD AT ALL

## 2023-12-15 ASSESSMENT — PATIENT HEALTH QUESTIONNAIRE - PHQ9
2. FEELING DOWN, DEPRESSED OR HOPELESS: 2
7. TROUBLE CONCENTRATING ON THINGS, SUCH AS READING THE NEWSPAPER OR WATCHING TELEVISION: 0
8. MOVING OR SPEAKING SO SLOWLY THAT OTHER PEOPLE COULD HAVE NOTICED. OR THE OPPOSITE, BEING SO FIGETY OR RESTLESS THAT YOU HAVE BEEN MOVING AROUND A LOT MORE THAN USUAL: 0
1. LITTLE INTEREST OR PLEASURE IN DOING THINGS: 2
10. IF YOU CHECKED OFF ANY PROBLEMS, HOW DIFFICULT HAVE THESE PROBLEMS MADE IT FOR YOU TO DO YOUR WORK, TAKE CARE OF THINGS AT HOME, OR GET ALONG WITH OTHER PEOPLE: 1
SUM OF ALL RESPONSES TO PHQ QUESTIONS 1-9: 8
SUM OF ALL RESPONSES TO PHQ QUESTIONS 1-9: 8
SUM OF ALL RESPONSES TO PHQ9 QUESTIONS 1 & 2: 4
6. FEELING BAD ABOUT YOURSELF - OR THAT YOU ARE A FAILURE OR HAVE LET YOURSELF OR YOUR FAMILY DOWN: 1
5. POOR APPETITE OR OVEREATING: 1
3. TROUBLE FALLING OR STAYING ASLEEP: 2
4. FEELING TIRED OR HAVING LITTLE ENERGY: 0
9. THOUGHTS THAT YOU WOULD BE BETTER OFF DEAD, OR OF HURTING YOURSELF: 0
SUM OF ALL RESPONSES TO PHQ QUESTIONS 1-9: 8
SUM OF ALL RESPONSES TO PHQ QUESTIONS 1-9: 8

## 2023-12-15 ASSESSMENT — COLUMBIA-SUICIDE SEVERITY RATING SCALE - C-SSRS
5. HAVE YOU STARTED TO WORK OUT OR WORKED OUT THE DETAILS OF HOW TO KILL YOURSELF? DO YOU INTEND TO CARRY OUT THIS PLAN?: NO
3. HAVE YOU BEEN THINKING ABOUT HOW YOU MIGHT KILL YOURSELF?: NO
4. HAVE YOU HAD THESE THOUGHTS AND HAD SOME INTENTION OF ACTING ON THEM?: NO
7. DID THIS OCCUR IN THE LAST THREE MONTHS: NO

## 2023-12-15 NOTE — PROGRESS NOTES
Selene Veras presents today for   Chief Complaint   Patient presents with    Follow-up     Lab results       Is someone accompanying this pt? Yes    Is the patient using any DME equipment during OV? No    Depression Screenin/15/2023    11:48 AM   PHQ-9    Little interest or pleasure in doing things 2   Feeling down, depressed, or hopeless 2   Trouble falling or staying asleep, or sleeping too much 2   Feeling tired or having little energy 0   Poor appetite or overeating 1   Feeling bad about yourself - or that you are a failure or have let yourself or your family down 1   Trouble concentrating on things, such as reading the newspaper or watching television 0   Moving or speaking so slowly that other people could have noticed. Or the opposite - being so fidgety or restless that you have been moving around a lot more than usual 0   Thoughts that you would be better off dead, or of hurting yourself in some way 0   PHQ-2 Score 4   PHQ-9 Total Score 8   If you checked off any problems, how difficult have these problems made it for you to do your work, take care of things at home, or get along with other people? 1               Health Maintenance reviewed and discussed and ordered per Provider. Health Maintenance Due   Topic Date Due    COVID-19 Vaccine (1) Never done    DTaP/Tdap/Td vaccine (1 - Tdap) Never done    Shingles vaccine (1 of 2) Never done    Hepatitis B vaccine (1 of 3 - Risk 3-dose series) Never done    Respiratory Syncytial Virus (RSV) age 61 yrs+ (3 - 1-dose 60+ series) Never done    Pneumococcal 65+ years Vaccine (2 - PCV) 2018    Diabetic retinal exam  2019    Breast cancer screen  05/10/2021    Diabetic foot exam  2022    Flu vaccine (1) 2023   . 1. \"Have you been to the ER, urgent care clinic since your last visit? Hospitalized since your last visit? \" No    2.  \"Have you seen or consulted any other health care providers outside of the [facility]

## 2023-12-15 NOTE — PATIENT INSTRUCTIONS
Call [address], [address] [facility] to schedule mammogram with Richard Kearns for [facility] sent in September; need to schedule appointment

## 2023-12-15 NOTE — PROGRESS NOTES
[address] [facility], [facility]               [phone]        Tatiana Lind is a 79 y.o. female and presents with Follow-up (Lab results)           Assessment/Plan:  Lakesha Flynn was seen today for follow-up. Diagnoses and all orders for this visit:    Controlled type 2 diabetes mellitus with complication, with long-term current use of insulin ([address])    Lymphocytosis    Iron deficiency    Hypertriglyceridemia    Encounter for screening mammogram for malignant neoplasm of breast    Type 2 diabetes mellitus with diabetic polyneuropathy, without long-term current use of insulin ([facility])  -     insulin glargine (LANTUS SOLOSTAR) 100 UNIT/ML injection pen; ADMINISTER 30 UNITS UNDER THE SKIN AT BEDTIME    Vitamin D deficiency  -     Cholecalciferol (VITAMIN D3) 50 MCG (2000 UT) CAPS; Take one capsule daily po    Rectal bleeding      DM2: controlled; states she knows what to eat now; reduce insulin to 30 units nightly;     Encouraged supportive care with mucinex/sinus rinse for lymphocytosis/cough; likely viral infection    Hypertriglyceridemia: improving with diabetes improvement    Mammo order sent to [facility] to perform; gave pt number to call    Vitamin D: refill provided    Advised to schedule appointment with Gustavo Alvarenga for evaluation downstairs            Follow up and disposition:   Return in about 4 weeks (around 1/12/2024), or if symptoms worsen or fail to improve.       Health Maintenance:   Health Maintenance   Topic Date Due    COVID-19 Vaccine (1) Never done    DTaP/Tdap/Td vaccine (1 - Tdap) Never done    Shingles vaccine (1 of 2) Never done    Hepatitis B vaccine (1 of 3 - Risk 3-dose series) Never done    Respiratory Syncytial Virus (RSV) age 61 yrs+ (3 - 1-dose 60+ series) Never done    Pneumococcal 65+ years Vaccine (2 - PCV) 01/13/2018    Diabetic retinal exam  09/26/2019    Breast cancer screen  05/10/2021    Diabetic foot exam  12/08/2022    Flu vaccine (1) 08/01/2023

## 2024-01-08 ENCOUNTER — TELEPHONE (OUTPATIENT)
Facility: CLINIC | Age: 69
End: 2024-01-08

## 2024-01-08 NOTE — TELEPHONE ENCOUNTER
Called patient, she insists that is not one her medications.  She states she knows what she takes and telmisartan is not one of them.

## 2024-01-08 NOTE — TELEPHONE ENCOUNTER
Patient stated she was prescribed Telmisartan 40 mg tablet, and took her first dose on 1/5 and started vomiting.

## 2024-01-19 ENCOUNTER — OFFICE VISIT (OUTPATIENT)
Facility: CLINIC | Age: 69
End: 2024-01-19
Payer: MEDICARE

## 2024-01-19 VITALS
TEMPERATURE: 98.1 F | OXYGEN SATURATION: 97 % | WEIGHT: 204 LBS | DIASTOLIC BLOOD PRESSURE: 74 MMHG | SYSTOLIC BLOOD PRESSURE: 132 MMHG | HEIGHT: 65 IN | HEART RATE: 105 BPM | BODY MASS INDEX: 33.99 KG/M2 | RESPIRATION RATE: 16 BRPM

## 2024-01-19 DIAGNOSIS — I10 ESSENTIAL HYPERTENSION: ICD-10-CM

## 2024-01-19 DIAGNOSIS — F20.9 SCHIZOPHRENIA, UNSPECIFIED TYPE (HCC): ICD-10-CM

## 2024-01-19 DIAGNOSIS — K62.5 RECTAL BLEEDING: ICD-10-CM

## 2024-01-19 DIAGNOSIS — Z12.31 ENCOUNTER FOR SCREENING MAMMOGRAM FOR MALIGNANT NEOPLASM OF BREAST: ICD-10-CM

## 2024-01-19 DIAGNOSIS — E11.42 TYPE 2 DIABETES MELLITUS WITH DIABETIC POLYNEUROPATHY, WITHOUT LONG-TERM CURRENT USE OF INSULIN (HCC): Primary | ICD-10-CM

## 2024-01-19 PROCEDURE — 3017F COLORECTAL CA SCREEN DOC REV: CPT | Performed by: FAMILY MEDICINE

## 2024-01-19 PROCEDURE — G8427 DOCREV CUR MEDS BY ELIG CLIN: HCPCS | Performed by: FAMILY MEDICINE

## 2024-01-19 PROCEDURE — G8417 CALC BMI ABV UP PARAM F/U: HCPCS | Performed by: FAMILY MEDICINE

## 2024-01-19 PROCEDURE — 3078F DIAST BP <80 MM HG: CPT | Performed by: FAMILY MEDICINE

## 2024-01-19 PROCEDURE — 1123F ACP DISCUSS/DSCN MKR DOCD: CPT | Performed by: FAMILY MEDICINE

## 2024-01-19 PROCEDURE — 3075F SYST BP GE 130 - 139MM HG: CPT | Performed by: FAMILY MEDICINE

## 2024-01-19 PROCEDURE — 2022F DILAT RTA XM EVC RTNOPTHY: CPT | Performed by: FAMILY MEDICINE

## 2024-01-19 PROCEDURE — 3046F HEMOGLOBIN A1C LEVEL >9.0%: CPT | Performed by: FAMILY MEDICINE

## 2024-01-19 PROCEDURE — G8484 FLU IMMUNIZE NO ADMIN: HCPCS | Performed by: FAMILY MEDICINE

## 2024-01-19 PROCEDURE — 1090F PRES/ABSN URINE INCON ASSESS: CPT | Performed by: FAMILY MEDICINE

## 2024-01-19 PROCEDURE — 99214 OFFICE O/P EST MOD 30 MIN: CPT | Performed by: FAMILY MEDICINE

## 2024-01-19 PROCEDURE — 4004F PT TOBACCO SCREEN RCVD TLK: CPT | Performed by: FAMILY MEDICINE

## 2024-01-19 PROCEDURE — G8399 PT W/DXA RESULTS DOCUMENT: HCPCS | Performed by: FAMILY MEDICINE

## 2024-01-19 RX ORDER — TELMISARTAN 40 MG/1
40 TABLET ORAL DAILY
Qty: 90 TABLET | Refills: 2 | Status: SHIPPED | OUTPATIENT
Start: 2024-01-19

## 2024-01-19 RX ORDER — AMLODIPINE BESYLATE 10 MG/1
TABLET ORAL
COMMUNITY
Start: 2023-11-27 | End: 2024-01-19 | Stop reason: ALTCHOICE

## 2024-01-19 NOTE — PROGRESS NOTES
telmisartan (MICARDIS) 40 MG tablet Take 1 tablet by mouth daily 90 tablet 2    insulin glargine (LANTUS SOLOSTAR) 100 UNIT/ML injection pen ADMINISTER 30 UNITS UNDER THE SKIN AT BEDTIME 15 mL 5    Cholecalciferol (VITAMIN D3) 50 MCG (2000 UT) CAPS Take one capsule daily po 90 capsule 3    pantoprazole (PROTONIX) 40 MG tablet Take 1 tablet by mouth every morning (before breakfast) 90 tablet 2    glipiZIDE (GLUCOTROL XL) 10 MG extended release tablet Take 1 tablet by mouth 2 times daily 180 tablet 2    simvastatin (ZOCOR) 10 MG tablet Take 1 tablet by mouth nightly 90 tablet 2    ferrous sulfate (IRON 325) 325 (65 Fe) MG tablet Take 1 tablet by mouth in the morning and at bedtime      Insulin Pen Needle (BD PEN NEEDLE NIRMALA 2ND GEN) 32G X 4 MM MISC USE AS DIRECTED ONCE DAILY 100 each 5    acetaminophen (TYLENOL) 650 MG extended release tablet Take 2 tablets by mouth as needed      aspirin 81 MG EC tablet Take by mouth daily      bimatoprost (LUMIGAN) 0.01 % SOLN ophthalmic drops Apply 1 drop to eye      blood glucose monitor kit and supplies Use once twice daily for glucose testing for type 2 diabetes E11.65 Dispense sufficient amount for indicated testing frequency plus additional to accommodate PRN testing needs. Dispense all needed supplies to include: monitor, strips, lancing device, lancets, control solutions, alcohol swabs. 1 kit 0    Lancets MISC Use twice daily for glucose testing for type 2 diabetes E11.65 200 each 3    blood glucose monitor strips Test blood glucose once a day prior to eating and [prior to insulin administration as needed for symptoms of irregular blood glucose for type 2 diabetes E11.65. Dispense sufficient amount for indicated testing frequency plus additional to accommodate PRN testing needs. 200 strip 3    Alcohol Swabs (ALCOHOL PADS) 70 % PADS Apply topically to digit for glucose monitoring twice daily for type 2 diabetes E11.65 200 each 3    QUEtiapine (SEROQUEL) 100 MG tablet TAKE 1

## 2024-01-25 ENCOUNTER — OFFICE VISIT (OUTPATIENT)
Facility: CLINIC | Age: 69
End: 2024-01-25

## 2024-01-25 VITALS — SYSTOLIC BLOOD PRESSURE: 133 MMHG | DIASTOLIC BLOOD PRESSURE: 76 MMHG

## 2024-01-25 DIAGNOSIS — I10 ESSENTIAL HYPERTENSION: Primary | ICD-10-CM

## 2024-01-29 RX ORDER — FERROUS SULFATE 325(65) MG
325 TABLET ORAL 2 TIMES DAILY
Qty: 90 TABLET | Refills: 0 | Status: SHIPPED | OUTPATIENT
Start: 2024-01-29

## 2024-04-24 ENCOUNTER — TELEPHONE (OUTPATIENT)
Facility: CLINIC | Age: 69
End: 2024-04-24

## 2024-04-24 NOTE — TELEPHONE ENCOUNTER
Pharmacy sent referral for medication and needs to be sent back or verbal consent.   Fax number [phone]

## 2024-04-24 NOTE — TELEPHONE ENCOUNTER
PT stated when she got an mammogram in 2019 and they found something on her right breast and wanted to see about getting another one

## 2024-04-25 NOTE — TELEPHONE ENCOUNTER
Please advise pt that she was advised to obtain a mammogram/order placed in August 2023    Please advise her to call to schedule her mammogram overdue    Please remind her she needs to perform once/year

## 2024-04-26 ENCOUNTER — TELEPHONE (OUTPATIENT)
Facility: CLINIC | Age: 69
End: 2024-04-26

## 2024-04-26 NOTE — TELEPHONE ENCOUNTER
Received [facility] form requesting CGM (glucose monitoring device); please advise pt that she is not taking insulin with meals and sugars are controlled so I do not recommend any glucose monitoring device    She only needs to test sugar prior to insulin use (once/daily)    Forms not signed

## 2024-05-03 ENCOUNTER — TELEPHONE (OUTPATIENT)
Facility: CLINIC | Age: 69
End: 2024-05-03

## 2024-05-03 NOTE — TELEPHONE ENCOUNTER
PT needs provider to fax over the sleep study results over to [facility], fax number [phone]

## 2024-05-13 ENCOUNTER — HOSPITAL ENCOUNTER (OUTPATIENT)
Facility: HOSPITAL | Age: 69
Setting detail: SPECIMEN
Discharge: HOME | End: 2024-05-16
Payer: MEDICARE

## 2024-05-13 DIAGNOSIS — E11.42 TYPE 2 DIABETES MELLITUS WITH DIABETIC POLYNEUROPATHY, WITHOUT LONG-TERM CURRENT USE OF INSULIN (HCC): ICD-10-CM

## 2024-05-13 LAB
ALBUMIN SERPL-MCNC: 3.8 G/DL (ref 3.4–5)
ALBUMIN/GLOB SERPL: 1.2 (ref 0.8–1.7)
ALP SERPL-CCNC: 79 U/L (ref 45–117)
ALT SERPL-CCNC: 23 U/L (ref 13–56)
ANION GAP SERPL CALC-SCNC: 3 MMOL/L (ref 3–18)
APPEARANCE UR: CLEAR
AST SERPL-CCNC: 13 U/L (ref 10–38)
BACTERIA URNS QL MICRO: NEGATIVE /HPF
BASOPHILS # BLD: 0.1 K/UL (ref 0–0.1)
BASOPHILS NFR BLD: 1 % (ref 0–2)
BILIRUB SERPL-MCNC: 0.5 MG/DL (ref 0.2–1)
BILIRUB UR QL: NEGATIVE
BUN SERPL-MCNC: 14 MG/DL (ref 7–18)
BUN/CREAT SERPL: 15 (ref 12–20)
CALCIUM SERPL-MCNC: 9.4 MG/DL (ref 8.5–10.1)
CHLORIDE SERPL-SCNC: 106 MMOL/L (ref 100–111)
CHOLEST SERPL-MCNC: 164 MG/DL
CO2 SERPL-SCNC: 30 MMOL/L (ref 21–32)
COLOR UR: YELLOW
CREAT SERPL-MCNC: 0.91 MG/DL (ref 0.6–1.3)
CREAT UR-MCNC: 147 MG/DL (ref 30–125)
DIFFERENTIAL METHOD BLD: NORMAL
EOSINOPHIL # BLD: 0.2 K/UL (ref 0–0.4)
EOSINOPHIL NFR BLD: 2 % (ref 0–5)
EPITH CASTS URNS QL MICRO: NORMAL /LPF (ref 0–5)
ERYTHROCYTE [DISTWIDTH] IN BLOOD BY AUTOMATED COUNT: 12.3 % (ref 11.6–14.5)
EST. AVERAGE GLUCOSE BLD GHB EST-MCNC: 157 MG/DL
GLOBULIN SER CALC-MCNC: 3.3 G/DL (ref 2–4)
GLUCOSE SERPL-MCNC: 109 MG/DL (ref 74–99)
GLUCOSE UR STRIP.AUTO-MCNC: NEGATIVE MG/DL
HBA1C MFR BLD: 7.1 % (ref 4.2–5.6)
HCT VFR BLD AUTO: 43.1 % (ref 35–45)
HDLC SERPL-MCNC: 48 MG/DL (ref 40–60)
HDLC SERPL: 3.4 (ref 0–5)
HGB BLD-MCNC: 13.6 G/DL (ref 12–16)
HGB UR QL STRIP: NEGATIVE
IMM GRANULOCYTES # BLD AUTO: 0 K/UL (ref 0–0.04)
IMM GRANULOCYTES NFR BLD AUTO: 0 % (ref 0–0.5)
KETONES UR QL STRIP.AUTO: NEGATIVE MG/DL
LDLC SERPL CALC-MCNC: 84.6 MG/DL (ref 0–100)
LEUKOCYTE ESTERASE UR QL STRIP.AUTO: NEGATIVE
LIPID PANEL: ABNORMAL
LYMPHOCYTES # BLD: 3 K/UL (ref 0.9–3.6)
LYMPHOCYTES NFR BLD: 31 % (ref 21–52)
MCH RBC QN AUTO: 30.2 PG (ref 24–34)
MCHC RBC AUTO-ENTMCNC: 31.6 G/DL (ref 31–37)
MCV RBC AUTO: 95.8 FL (ref 78–100)
MICROALBUMIN UR-MCNC: 2.62 MG/DL (ref 0–3)
MICROALBUMIN/CREAT UR-RTO: 18 MG/G (ref 0–30)
MONOCYTES # BLD: 0.7 K/UL (ref 0.05–1.2)
MONOCYTES NFR BLD: 8 % (ref 3–10)
NEUTS SEG # BLD: 5.5 K/UL (ref 1.8–8)
NEUTS SEG NFR BLD: 58 % (ref 40–73)
NITRITE UR QL STRIP.AUTO: NEGATIVE
NRBC # BLD: 0 K/UL (ref 0–0.01)
NRBC BLD-RTO: 0 PER 100 WBC
PH UR STRIP: 5.5 (ref 5–8)
PLATELET # BLD AUTO: 392 K/UL (ref 135–420)
PMV BLD AUTO: 9.9 FL (ref 9.2–11.8)
POTASSIUM SERPL-SCNC: 4.3 MMOL/L (ref 3.5–5.5)
PROT SERPL-MCNC: 7.1 G/DL (ref 6.4–8.2)
PROT UR STRIP-MCNC: NEGATIVE MG/DL
RBC # BLD AUTO: 4.5 M/UL (ref 4.2–5.3)
RBC #/AREA URNS HPF: NORMAL /HPF (ref 0–5)
SODIUM SERPL-SCNC: 139 MMOL/L (ref 136–145)
SP GR UR REFRACTOMETRY: 1.02 (ref 1–1.03)
TRIGL SERPL-MCNC: 157 MG/DL
TSH SERPL DL<=0.05 MIU/L-ACNC: 1.4 UIU/ML (ref 0.36–3.74)
UROBILINOGEN UR QL STRIP.AUTO: 0.2 EU/DL (ref 0.2–1)
VLDLC SERPL CALC-MCNC: 31.4 MG/DL
WBC # BLD AUTO: 9.6 K/UL (ref 4.6–13.2)
WBC URNS QL MICRO: NORMAL /HPF (ref 0–4)

## 2024-05-13 PROCEDURE — 81001 URINALYSIS AUTO W/SCOPE: CPT

## 2024-05-13 PROCEDURE — 84443 ASSAY THYROID STIM HORMONE: CPT

## 2024-05-13 PROCEDURE — 80053 COMPREHEN METABOLIC PANEL: CPT

## 2024-05-13 PROCEDURE — 82570 ASSAY OF URINE CREATININE: CPT

## 2024-05-13 PROCEDURE — 36415 COLL VENOUS BLD VENIPUNCTURE: CPT

## 2024-05-13 PROCEDURE — 80061 LIPID PANEL: CPT

## 2024-05-13 PROCEDURE — 85025 COMPLETE CBC W/AUTO DIFF WBC: CPT

## 2024-05-13 PROCEDURE — 83036 HEMOGLOBIN GLYCOSYLATED A1C: CPT

## 2024-05-13 PROCEDURE — 82043 UR ALBUMIN QUANTITATIVE: CPT

## 2024-05-20 ENCOUNTER — OFFICE VISIT (OUTPATIENT)
Facility: CLINIC | Age: 69
End: 2024-05-20

## 2024-05-20 VITALS — DIASTOLIC BLOOD PRESSURE: 86 MMHG | SYSTOLIC BLOOD PRESSURE: 139 MMHG

## 2024-05-20 DIAGNOSIS — Z79.4 CONTROLLED TYPE 2 DIABETES MELLITUS WITH COMPLICATION, WITH LONG-TERM CURRENT USE OF INSULIN (HCC): ICD-10-CM

## 2024-05-20 DIAGNOSIS — N64.4 BREAST PAIN: Primary | ICD-10-CM

## 2024-05-20 DIAGNOSIS — E11.69 HYPERLIPIDEMIA ASSOCIATED WITH TYPE 2 DIABETES MELLITUS (HCC): ICD-10-CM

## 2024-05-20 DIAGNOSIS — R07.9 CHEST PAIN, UNSPECIFIED TYPE: ICD-10-CM

## 2024-05-20 DIAGNOSIS — E11.8 CONTROLLED TYPE 2 DIABETES MELLITUS WITH COMPLICATION, WITH LONG-TERM CURRENT USE OF INSULIN (HCC): ICD-10-CM

## 2024-05-20 DIAGNOSIS — E78.5 HYPERLIPIDEMIA ASSOCIATED WITH TYPE 2 DIABETES MELLITUS (HCC): ICD-10-CM

## 2024-05-20 DIAGNOSIS — I69.319 CVA, OLD, COGNITIVE DEFICITS: ICD-10-CM

## 2024-05-20 NOTE — PROGRESS NOTES
[address] [place] [address]               [phone]        Trudy Monk is a 69 y.o. female and presents with No chief complaint on file.           Assessment/Plan:  There are no diagnoses linked to this encounter.        Follow up and disposition:   No follow-ups on file.      Health Maintenance:   Health Maintenance   Topic Date Due    COVID-19 Vaccine (1) Never done    DTaP/Tdap/Td vaccine (1 - Tdap) Never done    Shingles vaccine (1 of 2) Never done    Respiratory Syncytial Virus (RSV) Pregnant or age 60 yrs+ (1 - 1-dose 60+ series) Never done    Pneumococcal 65+ years Vaccine (2 of 2 - PCV) 01/13/2018    Diabetic retinal exam  09/26/2019    Breast cancer screen  05/10/2021    Diabetic foot exam  12/08/2022    Annual Wellness Visit (Medicare Advantage)  01/01/2024    Flu vaccine (Season Ended) 08/01/2024    A1C test (Diabetic or Prediabetic)  11/13/2024    Depression Monitoring  12/15/2024    Diabetic Alb to Cr ratio (uACR) test  05/13/2025    Lipids  05/13/2025    GFR test (Diabetes, CKD 3-4, OR last GFR 15-59)  05/13/2025    Colorectal Cancer Screen  09/02/2025    DEXA (modify frequency per FRAX score)  Completed    Hepatitis C screen  Completed    Hepatitis A vaccine  Aged Out    Hepatitis B vaccine  Aged Out    Hib vaccine  Aged Out    Polio vaccine  Aged Out    Meningococcal (ACWY) vaccine  Aged Out    HIV screen  Discontinued    Cervical cancer screen  Discontinued        Subjective   70 y/o female here for follow up    Had her partner bring us a note last week--c/o breast pain, will need diagnostic mammogram; advised to discuss today  Advised previously to schedule mammogram overdue    C/o chest pain and was advised to go to ER    HLD: Goal LDL <70 due to hx of CVA    DM2: on Lantus nightly; advised CGM not indicated given she only needs to test prior to her insulin administration    Labs 5/13  Normal thyroid  Urine micro ratio 18  ,  (was 187),

## 2024-06-03 ENCOUNTER — TELEPHONE (OUTPATIENT)
Facility: CLINIC | Age: 69
End: 2024-06-03

## 2024-06-03 NOTE — TELEPHONE ENCOUNTER
Pt called to get an order for diagnostic mammogram to be sent ot [address] [facility] [address] phone number:[phone].  Pt already made appt for mammogram OV with their office for 7/25.  Pt has appt with PCP on 6/10.

## 2024-06-10 ENCOUNTER — OFFICE VISIT (OUTPATIENT)
Facility: CLINIC | Age: 69
End: 2024-06-10
Payer: MEDICARE

## 2024-06-10 ENCOUNTER — TELEPHONE (OUTPATIENT)
Facility: CLINIC | Age: 69
End: 2024-06-10

## 2024-06-10 VITALS
DIASTOLIC BLOOD PRESSURE: 88 MMHG | BODY MASS INDEX: 31.32 KG/M2 | RESPIRATION RATE: 16 BRPM | WEIGHT: 188 LBS | SYSTOLIC BLOOD PRESSURE: 136 MMHG | TEMPERATURE: 98.1 F | HEIGHT: 65 IN | HEART RATE: 86 BPM

## 2024-06-10 DIAGNOSIS — Z79.4 CONTROLLED TYPE 2 DIABETES MELLITUS WITH MICROALBUMINURIA, WITH LONG-TERM CURRENT USE OF INSULIN (HCC): ICD-10-CM

## 2024-06-10 DIAGNOSIS — E11.42 DIABETIC POLYNEUROPATHY ASSOCIATED WITH TYPE 2 DIABETES MELLITUS (HCC): ICD-10-CM

## 2024-06-10 DIAGNOSIS — R80.9 CONTROLLED TYPE 2 DIABETES MELLITUS WITH MICROALBUMINURIA, WITH LONG-TERM CURRENT USE OF INSULIN (HCC): ICD-10-CM

## 2024-06-10 DIAGNOSIS — E11.29 CONTROLLED TYPE 2 DIABETES MELLITUS WITH MICROALBUMINURIA, WITH LONG-TERM CURRENT USE OF INSULIN (HCC): ICD-10-CM

## 2024-06-10 DIAGNOSIS — I69.319 CVA, OLD, COGNITIVE DEFICITS: ICD-10-CM

## 2024-06-10 DIAGNOSIS — E78.5 HYPERLIPIDEMIA ASSOCIATED WITH TYPE 2 DIABETES MELLITUS (HCC): Primary | ICD-10-CM

## 2024-06-10 DIAGNOSIS — N64.4 BREAST PAIN: ICD-10-CM

## 2024-06-10 DIAGNOSIS — I10 ESSENTIAL HYPERTENSION: ICD-10-CM

## 2024-06-10 DIAGNOSIS — E61.1 IRON DEFICIENCY: ICD-10-CM

## 2024-06-10 DIAGNOSIS — R74.8 LOW SERUM HDL: ICD-10-CM

## 2024-06-10 DIAGNOSIS — E66.09 CLASS 1 OBESITY DUE TO EXCESS CALORIES WITH SERIOUS COMORBIDITY AND BODY MASS INDEX (BMI) OF 31.0 TO 31.9 IN ADULT: ICD-10-CM

## 2024-06-10 DIAGNOSIS — K21.9 GASTROESOPHAGEAL REFLUX DISEASE, UNSPECIFIED WHETHER ESOPHAGITIS PRESENT: ICD-10-CM

## 2024-06-10 DIAGNOSIS — E11.69 HYPERLIPIDEMIA ASSOCIATED WITH TYPE 2 DIABETES MELLITUS (HCC): Primary | ICD-10-CM

## 2024-06-10 PROCEDURE — G8399 PT W/DXA RESULTS DOCUMENT: HCPCS | Performed by: FAMILY MEDICINE

## 2024-06-10 PROCEDURE — 3051F HG A1C>EQUAL 7.0%<8.0%: CPT | Performed by: FAMILY MEDICINE

## 2024-06-10 PROCEDURE — 99214 OFFICE O/P EST MOD 30 MIN: CPT | Performed by: FAMILY MEDICINE

## 2024-06-10 PROCEDURE — G8417 CALC BMI ABV UP PARAM F/U: HCPCS | Performed by: FAMILY MEDICINE

## 2024-06-10 PROCEDURE — 3075F SYST BP GE 130 - 139MM HG: CPT | Performed by: FAMILY MEDICINE

## 2024-06-10 PROCEDURE — 1123F ACP DISCUSS/DSCN MKR DOCD: CPT | Performed by: FAMILY MEDICINE

## 2024-06-10 PROCEDURE — 3017F COLORECTAL CA SCREEN DOC REV: CPT | Performed by: FAMILY MEDICINE

## 2024-06-10 PROCEDURE — G8427 DOCREV CUR MEDS BY ELIG CLIN: HCPCS | Performed by: FAMILY MEDICINE

## 2024-06-10 PROCEDURE — 1090F PRES/ABSN URINE INCON ASSESS: CPT | Performed by: FAMILY MEDICINE

## 2024-06-10 PROCEDURE — 4004F PT TOBACCO SCREEN RCVD TLK: CPT | Performed by: FAMILY MEDICINE

## 2024-06-10 PROCEDURE — 2022F DILAT RTA XM EVC RTNOPTHY: CPT | Performed by: FAMILY MEDICINE

## 2024-06-10 PROCEDURE — 3079F DIAST BP 80-89 MM HG: CPT | Performed by: FAMILY MEDICINE

## 2024-06-10 RX ORDER — GLIPIZIDE 10 MG/1
10 TABLET, FILM COATED, EXTENDED RELEASE ORAL 2 TIMES DAILY
Qty: 180 TABLET | Refills: 2 | Status: SHIPPED | OUTPATIENT
Start: 2024-06-10

## 2024-06-10 RX ORDER — TELMISARTAN 40 MG/1
40 TABLET ORAL DAILY
Qty: 90 TABLET | Refills: 2 | Status: SHIPPED | OUTPATIENT
Start: 2024-06-10

## 2024-06-10 RX ORDER — SIMVASTATIN 10 MG
10 TABLET ORAL NIGHTLY
Qty: 90 TABLET | Refills: 2 | Status: SHIPPED | OUTPATIENT
Start: 2024-06-10

## 2024-06-10 RX ORDER — INSULIN GLARGINE 100 [IU]/ML
INJECTION, SOLUTION SUBCUTANEOUS
Qty: 15 ML | Refills: 5 | Status: SHIPPED | OUTPATIENT
Start: 2024-06-10

## 2024-06-10 RX ORDER — PEN NEEDLE, DIABETIC 32GX 5/32"
NEEDLE, DISPOSABLE MISCELLANEOUS
Qty: 100 EACH | Refills: 5 | Status: SHIPPED | OUTPATIENT
Start: 2024-06-10

## 2024-06-10 RX ORDER — PANTOPRAZOLE SODIUM 40 MG/1
40 TABLET, DELAYED RELEASE ORAL
Qty: 90 TABLET | Refills: 2 | Status: SHIPPED | OUTPATIENT
Start: 2024-06-10

## 2024-06-10 ASSESSMENT — PATIENT HEALTH QUESTIONNAIRE - PHQ9
2. FEELING DOWN, DEPRESSED OR HOPELESS: NOT AT ALL
SUM OF ALL RESPONSES TO PHQ QUESTIONS 1-9: 0

## 2024-06-10 ASSESSMENT — ENCOUNTER SYMPTOMS
DIARRHEA: 0
COUGH: 0
VOMITING: 0
SHORTNESS OF BREATH: 0
RHINORRHEA: 0
NAUSEA: 0

## 2024-06-10 NOTE — PROGRESS NOTES
[address] [place] [address]               [phone]        Trudy Monk is a 69 y.o. female and presents with Follow-up (Lab results)           Assessment/Plan:  Trudy was seen today for follow-up.    Diagnoses and all orders for this visit:    Hyperlipidemia associated with type 2 diabetes mellitus (HCC)    CVA, old, cognitive deficits    Low serum HDL    Breast pain  -     VICK KODY DIGITAL DIAGNOSTIC BILATERAL; Future    Controlled type 2 diabetes mellitus with microalbuminuria, with long-term current use of insulin (HCC)  -     TSH; Future  -     Microalbumin / Creatinine Urine Ratio; Future  -     Lipid Panel; Future  -     Hemoglobin A1C; Future  -     Comprehensive Metabolic Panel; Future  -     CBC with Auto Differential; Future  -     Urinalysis with Microscopic; Future    Class 1 obesity due to excess calories with serious comorbidity and body mass index (BMI) of 31.0 to 31.9 in adult    Diabetic polyneuropathy associated with type 2 diabetes mellitus (HCC)  -     telmisartan (MICARDIS) 40 MG tablet; Take 1 tablet by mouth daily  -     simvastatin (ZOCOR) 10 MG tablet; Take 1 tablet by mouth nightly  -     insulin glargine (LANTUS SOLOSTAR) 100 UNIT/ML injection pen; ADMINISTER 30 UNITS UNDER THE SKIN AT BEDTIME  -     Insulin Pen Needle (BD PEN NEEDLE NIRMALA 2ND GEN) 32G X 4 MM MISC; USE AS DIRECTED ONCE DAILY  -     glipiZIDE (GLUCOTROL XL) 10 MG extended release tablet; Take 1 tablet by mouth 2 times daily    Essential hypertension  -     telmisartan (MICARDIS) 40 MG tablet; Take 1 tablet by mouth daily    Gastroesophageal reflux disease, unspecified whether esophagitis present  -     pantoprazole (PROTONIX) 40 MG tablet; Take 1 tablet by mouth every morning (before breakfast)    Iron deficiency  -     Iron and TIBC; Future  -     Ferritin; Future      HLD: LDL uncontrolled, goal <70; states she will return to her usual diet; declines increase in statin at

## 2024-06-10 NOTE — TELEPHONE ENCOUNTER
Fax referral as per pt request    diagnostic mammogram to be sent ot [address] [facility] phone number:[phone].

## 2024-06-21 ENCOUNTER — TELEPHONE (OUTPATIENT)
Facility: CLINIC | Age: 69
End: 2024-06-21

## 2024-07-26 ENCOUNTER — TELEPHONE (OUTPATIENT)
Facility: CLINIC | Age: 69
End: 2024-07-26

## 2024-07-26 DIAGNOSIS — R92.8 ABNORMAL MAMMOGRAM OF LEFT BREAST: Primary | ICD-10-CM
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
## 2024-12-02 ENCOUNTER — HOSPITAL ENCOUNTER (OUTPATIENT)
Facility: HOSPITAL | Age: 69
Setting detail: SPECIMEN
Discharge: HOME | End: 2024-12-05
Payer: MEDICARE

## 2024-12-02 ENCOUNTER — OFFICE VISIT (OUTPATIENT)
Facility: CLINIC | Age: 69
End: 2024-12-02
Payer: MEDICARE

## 2024-12-02 VITALS
HEART RATE: 81 BPM | HEIGHT: 65 IN | RESPIRATION RATE: 12 BRPM | DIASTOLIC BLOOD PRESSURE: 87 MMHG | WEIGHT: 186 LBS | SYSTOLIC BLOOD PRESSURE: 152 MMHG | BODY MASS INDEX: 30.99 KG/M2

## 2024-12-02 DIAGNOSIS — E66.09 CLASS 1 OBESITY DUE TO EXCESS CALORIES WITH SERIOUS COMORBIDITY AND BODY MASS INDEX (BMI) OF 30.0 TO 30.9 IN ADULT: ICD-10-CM

## 2024-12-02 DIAGNOSIS — E66.811 CLASS 1 OBESITY DUE TO EXCESS CALORIES WITH SERIOUS COMORBIDITY AND BODY MASS INDEX (BMI) OF 30.0 TO 30.9 IN ADULT: ICD-10-CM

## 2024-12-02 DIAGNOSIS — R80.9 CONTROLLED TYPE 2 DIABETES MELLITUS WITH MICROALBUMINURIA, WITH LONG-TERM CURRENT USE OF INSULIN (HCC): ICD-10-CM

## 2024-12-02 DIAGNOSIS — E11.29 CONTROLLED TYPE 2 DIABETES MELLITUS WITH MICROALBUMINURIA, WITH LONG-TERM CURRENT USE OF INSULIN (HCC): ICD-10-CM

## 2024-12-02 DIAGNOSIS — E61.1 IRON DEFICIENCY: ICD-10-CM

## 2024-12-02 DIAGNOSIS — Z79.4 CONTROLLED TYPE 2 DIABETES MELLITUS WITH MICROALBUMINURIA, WITH LONG-TERM CURRENT USE OF INSULIN (HCC): ICD-10-CM

## 2024-12-02 DIAGNOSIS — I10 PRIMARY HYPERTENSION: Primary | ICD-10-CM

## 2024-12-02 LAB
ALBUMIN SERPL-MCNC: 3.9 G/DL (ref 3.4–5)
ALBUMIN/GLOB SERPL: 1.1 (ref 0.8–1.7)
ALP SERPL-CCNC: 95 U/L (ref 45–117)
ALT SERPL-CCNC: 22 U/L (ref 13–56)
ANION GAP SERPL CALC-SCNC: 7 MMOL/L (ref 3–18)
APPEARANCE UR: CLEAR
AST SERPL-CCNC: 13 U/L (ref 10–38)
BACTERIA URNS QL MICRO: ABNORMAL /HPF
BASOPHILS # BLD: 0.1 K/UL (ref 0–0.1)
BASOPHILS NFR BLD: 1 % (ref 0–2)
BILIRUB SERPL-MCNC: 0.3 MG/DL (ref 0.2–1)
BILIRUB UR QL: NEGATIVE
BUN SERPL-MCNC: 17 MG/DL (ref 7–18)
BUN/CREAT SERPL: 16 (ref 12–20)
CALCIUM SERPL-MCNC: 9.6 MG/DL (ref 8.5–10.1)
CHLORIDE SERPL-SCNC: 106 MMOL/L (ref 100–111)
CHOLEST SERPL-MCNC: 181 MG/DL
CO2 SERPL-SCNC: 26 MMOL/L (ref 21–32)
COLOR UR: YELLOW
CREAT SERPL-MCNC: 1.06 MG/DL (ref 0.6–1.3)
CREAT UR-MCNC: 266 MG/DL (ref 30–125)
DIFFERENTIAL METHOD BLD: NORMAL
EOSINOPHIL # BLD: 0.2 K/UL (ref 0–0.4)
EOSINOPHIL NFR BLD: 2 % (ref 0–5)
EPITH CASTS URNS QL MICRO: ABNORMAL /LPF (ref 0–5)
ERYTHROCYTE [DISTWIDTH] IN BLOOD BY AUTOMATED COUNT: 12.2 % (ref 11.6–14.5)
EST. AVERAGE GLUCOSE BLD GHB EST-MCNC: 143 MG/DL
FERRITIN SERPL-MCNC: 140 NG/ML (ref 8–388)
GLOBULIN SER CALC-MCNC: 3.5 G/DL (ref 2–4)
GLUCOSE SERPL-MCNC: 136 MG/DL (ref 74–99)
GLUCOSE UR STRIP.AUTO-MCNC: NEGATIVE MG/DL
HBA1C MFR BLD: 6.6 % (ref 4.2–5.6)
HCT VFR BLD AUTO: 44.7 % (ref 35–45)
HDLC SERPL-MCNC: 53 MG/DL (ref 40–60)
HDLC SERPL: 3.4 (ref 0–5)
HGB BLD-MCNC: 14.2 G/DL (ref 12–16)
HGB UR QL STRIP: NEGATIVE
IMM GRANULOCYTES # BLD AUTO: 0 K/UL (ref 0–0.04)
IMM GRANULOCYTES NFR BLD AUTO: 0 % (ref 0–0.5)
IRON SATN MFR SERPL: 16 % (ref 20–50)
IRON SERPL-MCNC: 52 UG/DL (ref 50–175)
KETONES UR QL STRIP.AUTO: NEGATIVE MG/DL
LDLC SERPL CALC-MCNC: 104.2 MG/DL (ref 0–100)
LEUKOCYTE ESTERASE UR QL STRIP.AUTO: NEGATIVE
LIPID PANEL: ABNORMAL
LYMPHOCYTES # BLD: 3.2 K/UL (ref 0.9–3.6)
LYMPHOCYTES NFR BLD: 32 % (ref 21–52)
MCH RBC QN AUTO: 30.3 PG (ref 24–34)
MCHC RBC AUTO-ENTMCNC: 31.8 G/DL (ref 31–37)
MCV RBC AUTO: 95.3 FL (ref 78–100)
MICROALBUMIN UR-MCNC: 3.21 MG/DL (ref 0–3)
MICROALBUMIN/CREAT UR-RTO: 12 MG/G (ref 0–30)
MONOCYTES # BLD: 0.6 K/UL (ref 0.05–1.2)
MONOCYTES NFR BLD: 6 % (ref 3–10)
NEUTS SEG # BLD: 5.9 K/UL (ref 1.8–8)
NEUTS SEG NFR BLD: 59 % (ref 40–73)
NITRITE UR QL STRIP.AUTO: NEGATIVE
NRBC # BLD: 0 K/UL (ref 0–0.01)
NRBC BLD-RTO: 0 PER 100 WBC
PH UR STRIP: 5.5 (ref 5–8)
PLATELET # BLD AUTO: 414 K/UL (ref 135–420)
PMV BLD AUTO: 9.7 FL (ref 9.2–11.8)
POTASSIUM SERPL-SCNC: 4.1 MMOL/L (ref 3.5–5.5)
PROT SERPL-MCNC: 7.4 G/DL (ref 6.4–8.2)
PROT UR STRIP-MCNC: NEGATIVE MG/DL
RBC # BLD AUTO: 4.69 M/UL (ref 4.2–5.3)
RBC #/AREA URNS HPF: NEGATIVE /HPF (ref 0–5)
SODIUM SERPL-SCNC: 139 MMOL/L (ref 136–145)
SP GR UR REFRACTOMETRY: 1.02 (ref 1–1.03)
TIBC SERPL-MCNC: 323 UG/DL (ref 250–450)
TRIGL SERPL-MCNC: 119 MG/DL
TSH SERPL DL<=0.05 MIU/L-ACNC: 1.24 UIU/ML (ref 0.36–3.74)
UROBILINOGEN UR QL STRIP.AUTO: 0.2 EU/DL (ref 0.2–1)
VLDLC SERPL CALC-MCNC: 23.8 MG/DL
WBC # BLD AUTO: 9.9 K/UL (ref 4.6–13.2)
WBC URNS QL MICRO: NEGATIVE /HPF (ref 0–5)

## 2024-12-02 PROCEDURE — 83550 IRON BINDING TEST: CPT

## 2024-12-02 PROCEDURE — 1090F PRES/ABSN URINE INCON ASSESS: CPT | Performed by: FAMILY MEDICINE

## 2024-12-02 PROCEDURE — 1159F MED LIST DOCD IN RCRD: CPT | Performed by: FAMILY MEDICINE

## 2024-12-02 PROCEDURE — 85025 COMPLETE CBC W/AUTO DIFF WBC: CPT

## 2024-12-02 PROCEDURE — 82570 ASSAY OF URINE CREATININE: CPT

## 2024-12-02 PROCEDURE — G8399 PT W/DXA RESULTS DOCUMENT: HCPCS | Performed by: FAMILY MEDICINE

## 2024-12-02 PROCEDURE — 3017F COLORECTAL CA SCREEN DOC REV: CPT | Performed by: FAMILY MEDICINE

## 2024-12-02 PROCEDURE — 4004F PT TOBACCO SCREEN RCVD TLK: CPT | Performed by: FAMILY MEDICINE

## 2024-12-02 PROCEDURE — 99214 OFFICE O/P EST MOD 30 MIN: CPT | Performed by: FAMILY MEDICINE

## 2024-12-02 PROCEDURE — G8484 FLU IMMUNIZE NO ADMIN: HCPCS | Performed by: FAMILY MEDICINE

## 2024-12-02 PROCEDURE — 82043 UR ALBUMIN QUANTITATIVE: CPT

## 2024-12-02 PROCEDURE — 83540 ASSAY OF IRON: CPT

## 2024-12-02 PROCEDURE — 1123F ACP DISCUSS/DSCN MKR DOCD: CPT | Performed by: FAMILY MEDICINE

## 2024-12-02 PROCEDURE — 3077F SYST BP >= 140 MM HG: CPT | Performed by: FAMILY MEDICINE

## 2024-12-02 PROCEDURE — G8427 DOCREV CUR MEDS BY ELIG CLIN: HCPCS | Performed by: FAMILY MEDICINE

## 2024-12-02 PROCEDURE — G8417 CALC BMI ABV UP PARAM F/U: HCPCS | Performed by: FAMILY MEDICINE

## 2024-12-02 PROCEDURE — 1160F RVW MEDS BY RX/DR IN RCRD: CPT | Performed by: FAMILY MEDICINE

## 2024-12-02 PROCEDURE — 84443 ASSAY THYROID STIM HORMONE: CPT

## 2024-12-02 PROCEDURE — 83036 HEMOGLOBIN GLYCOSYLATED A1C: CPT

## 2024-12-02 PROCEDURE — 80061 LIPID PANEL: CPT

## 2024-12-02 PROCEDURE — 82728 ASSAY OF FERRITIN: CPT

## 2024-12-02 PROCEDURE — 81001 URINALYSIS AUTO W/SCOPE: CPT

## 2024-12-02 PROCEDURE — 80053 COMPREHEN METABOLIC PANEL: CPT

## 2024-12-02 PROCEDURE — 36415 COLL VENOUS BLD VENIPUNCTURE: CPT

## 2024-12-02 PROCEDURE — 3079F DIAST BP 80-89 MM HG: CPT | Performed by: FAMILY MEDICINE

## 2024-12-02 ASSESSMENT — ENCOUNTER SYMPTOMS: SHORTNESS OF BREATH: 0

## 2024-12-02 NOTE — PROGRESS NOTES
[address] [place] [address]               [phone]        Trudy Monk is a 69 y.o. female and presents with Follow-up           Assessment/Plan:  Trudy was seen today for follow-up.    Diagnoses and all orders for this visit:    Primary hypertension    Class 1 obesity due to excess calories with serious comorbidity and body mass index (BMI) of 30.0 to 30.9 in adult      HTN: uncontrolled; did not take her medications today  Obesity: I advised her that based on ingredients, ok to take with her current medications; I advised her that if her sugar increases due to elizabet/fruit powder noted in the ingredients, would recommend she discontinue the medication      Follow up and disposition:   Return in about 2 weeks (around 12/16/2024), or if symptoms worsen or fail to improve.      Health Maintenance:   Health Maintenance   Topic Date Due    DTaP/Tdap/Td vaccine (1 - Tdap) Never done    Shingles vaccine (1 of 2) Never done    Respiratory Syncytial Virus (RSV) Pregnant or age 60 yrs+ (1 - Risk 60-74 years 1-dose series) Never done    Pneumococcal 65+ years Vaccine (2 of 2 - PCV) 01/13/2018    Diabetic foot exam  12/08/2022    Annual Wellness Visit (Medicare Advantage)  01/01/2024    Flu vaccine (1) 08/01/2024    COVID-19 Vaccine (1 - 2023-24 season) Never done    A1C test (Diabetic or Prediabetic)  11/13/2024    Diabetic Alb to Cr ratio (uACR) test  05/13/2025    Lipids  05/13/2025    GFR test (Diabetes, CKD 3-4, OR last GFR 15-59)  05/13/2025    Depression Monitoring  06/10/2025    Breast cancer screen  07/25/2025    Colorectal Cancer Screen  09/02/2025    Diabetic retinal exam  11/05/2025    DEXA (modify frequency per FRAX score)  Completed    Hepatitis C screen  Completed    Hepatitis A vaccine  Aged Out    Hepatitis B vaccine  Aged Out    Hib vaccine  Aged Out    Polio vaccine  Aged Out    Meningococcal (ACWY) vaccine  Aged Out    HIV screen  Discontinued    Cervical

## 2025-01-03 DIAGNOSIS — E11.42 DIABETIC POLYNEUROPATHY ASSOCIATED WITH TYPE 2 DIABETES MELLITUS (HCC): ICD-10-CM

## 2025-01-03 DIAGNOSIS — I10 ESSENTIAL HYPERTENSION: ICD-10-CM

## 2025-01-03 RX ORDER — TELMISARTAN 40 MG/1
40 TABLET ORAL DAILY
Qty: 90 TABLET | OUTPATIENT
Start: 2025-01-03

## 2025-01-03 RX ORDER — TELMISARTAN 40 MG/1
40 TABLET ORAL DAILY
Qty: 30 TABLET | Refills: 0 | Status: SHIPPED | OUTPATIENT
Start: 2025-01-03

## 2025-01-15 ENCOUNTER — TELEPHONE (OUTPATIENT)
Facility: CLINIC | Age: 70
End: 2025-01-15

## 2025-01-15 DIAGNOSIS — E11.42 DIABETIC POLYNEUROPATHY ASSOCIATED WITH TYPE 2 DIABETES MELLITUS: ICD-10-CM

## 2025-01-15 RX ORDER — PEN NEEDLE, DIABETIC 32GX 5/32"
NEEDLE, DISPOSABLE MISCELLANEOUS
Qty: 100 EACH | Refills: 5 | Status: SHIPPED | OUTPATIENT
Start: 2025-01-15

## 2025-01-15 RX ORDER — INSULIN GLARGINE 100 [IU]/ML
INJECTION, SOLUTION SUBCUTANEOUS
Qty: 15 ML | Refills: 0 | Status: SHIPPED | OUTPATIENT
Start: 2025-01-15 | End: 2025-02-24 | Stop reason: SDUPTHER

## 2025-01-15 NOTE — TELEPHONE ENCOUNTER
Mrs. Monk called and requested these medications:    Insulin glargine (LANTUS SOLOSTAR) 100 unit/ML injection pen    Insulin Pen Needle 32G X 4 MM    Thanks

## 2025-01-23 RX ORDER — FERROUS SULFATE 325(65) MG
TABLET ORAL
Qty: 90 TABLET | Refills: 0 | Status: SHIPPED | OUTPATIENT
Start: 2025-01-23

## 2025-01-30 DIAGNOSIS — E11.42 DIABETIC POLYNEUROPATHY ASSOCIATED WITH TYPE 2 DIABETES MELLITUS (HCC): ICD-10-CM

## 2025-01-30 DIAGNOSIS — I10 ESSENTIAL HYPERTENSION: ICD-10-CM

## 2025-01-31 RX ORDER — TELMISARTAN 40 MG/1
40 TABLET ORAL DAILY
Qty: 30 TABLET | Refills: 0 | Status: SHIPPED | OUTPATIENT
Start: 2025-01-31

## 2025-02-24 ENCOUNTER — OFFICE VISIT (OUTPATIENT)
Facility: CLINIC | Age: 70
End: 2025-02-24

## 2025-02-24 VITALS
BODY MASS INDEX: 31.49 KG/M2 | HEART RATE: 97 BPM | SYSTOLIC BLOOD PRESSURE: 122 MMHG | RESPIRATION RATE: 16 BRPM | WEIGHT: 189 LBS | TEMPERATURE: 97.4 F | OXYGEN SATURATION: 96 % | HEIGHT: 65 IN | DIASTOLIC BLOOD PRESSURE: 86 MMHG

## 2025-02-24 DIAGNOSIS — I10 ESSENTIAL HYPERTENSION: ICD-10-CM

## 2025-02-24 DIAGNOSIS — E11.9 ENCOUNTER FOR DIABETIC FOOT EXAM (HCC): ICD-10-CM

## 2025-02-24 DIAGNOSIS — Z00.00 MEDICARE ANNUAL WELLNESS VISIT, SUBSEQUENT: Primary | ICD-10-CM

## 2025-02-24 DIAGNOSIS — E55.9 VITAMIN D DEFICIENCY: ICD-10-CM

## 2025-02-24 DIAGNOSIS — E61.1 IRON DEFICIENCY: ICD-10-CM

## 2025-02-24 DIAGNOSIS — E11.29 CONTROLLED TYPE 2 DIABETES MELLITUS WITH MICROALBUMINURIA, WITH LONG-TERM CURRENT USE OF INSULIN (HCC): ICD-10-CM

## 2025-02-24 DIAGNOSIS — R80.9 CONTROLLED TYPE 2 DIABETES MELLITUS WITH MICROALBUMINURIA, WITH LONG-TERM CURRENT USE OF INSULIN (HCC): ICD-10-CM

## 2025-02-24 DIAGNOSIS — K21.9 GASTROESOPHAGEAL REFLUX DISEASE, UNSPECIFIED WHETHER ESOPHAGITIS PRESENT: ICD-10-CM

## 2025-02-24 DIAGNOSIS — E78.5 HYPERLIPIDEMIA ASSOCIATED WITH TYPE 2 DIABETES MELLITUS (HCC): ICD-10-CM

## 2025-02-24 DIAGNOSIS — F20.9 SCHIZOPHRENIA, UNSPECIFIED TYPE (HCC): ICD-10-CM

## 2025-02-24 DIAGNOSIS — Z79.4 CONTROLLED TYPE 2 DIABETES MELLITUS WITH MICROALBUMINURIA, WITH LONG-TERM CURRENT USE OF INSULIN (HCC): ICD-10-CM

## 2025-02-24 DIAGNOSIS — E66.09 CLASS 1 OBESITY DUE TO EXCESS CALORIES WITH SERIOUS COMORBIDITY AND BODY MASS INDEX (BMI) OF 31.0 TO 31.9 IN ADULT: ICD-10-CM

## 2025-02-24 DIAGNOSIS — E11.42 DIABETIC POLYNEUROPATHY ASSOCIATED WITH TYPE 2 DIABETES MELLITUS (HCC): ICD-10-CM

## 2025-02-24 DIAGNOSIS — E11.69 HYPERLIPIDEMIA ASSOCIATED WITH TYPE 2 DIABETES MELLITUS (HCC): ICD-10-CM

## 2025-02-24 DIAGNOSIS — N18.30 CKD STAGE 3 DUE TO TYPE 2 DIABETES MELLITUS (HCC): ICD-10-CM

## 2025-02-24 DIAGNOSIS — E11.22 CKD STAGE 3 DUE TO TYPE 2 DIABETES MELLITUS (HCC): ICD-10-CM

## 2025-02-24 DIAGNOSIS — E66.811 CLASS 1 OBESITY DUE TO EXCESS CALORIES WITH SERIOUS COMORBIDITY AND BODY MASS INDEX (BMI) OF 31.0 TO 31.9 IN ADULT: ICD-10-CM

## 2025-02-24 RX ORDER — TELMISARTAN 40 MG/1
40 TABLET ORAL DAILY
Qty: 90 TABLET | Refills: 1 | Status: SHIPPED | OUTPATIENT
Start: 2025-02-24

## 2025-02-24 RX ORDER — PANTOPRAZOLE SODIUM 40 MG/1
40 TABLET, DELAYED RELEASE ORAL
Qty: 90 TABLET | Refills: 2 | Status: SHIPPED | OUTPATIENT
Start: 2025-02-24

## 2025-02-24 RX ORDER — GLIPIZIDE 10 MG/1
10 TABLET, FILM COATED, EXTENDED RELEASE ORAL 2 TIMES DAILY
Qty: 180 TABLET | Refills: 2 | Status: SHIPPED | OUTPATIENT
Start: 2025-02-24

## 2025-02-24 RX ORDER — INSULIN GLARGINE 100 [IU]/ML
INJECTION, SOLUTION SUBCUTANEOUS
Qty: 15 ML | Refills: 3 | Status: SHIPPED | OUTPATIENT
Start: 2025-02-24

## 2025-02-24 RX ORDER — ACETAMINOPHEN 160 MG
TABLET,DISINTEGRATING ORAL
Qty: 90 CAPSULE | Refills: 3 | Status: SHIPPED | OUTPATIENT
Start: 2025-02-24

## 2025-02-24 RX ORDER — ATORVASTATIN CALCIUM 20 MG/1
20 TABLET, FILM COATED ORAL EVERY EVENING
Qty: 90 TABLET | Refills: 2 | Status: SHIPPED | OUTPATIENT
Start: 2025-02-24

## 2025-02-24 SDOH — ECONOMIC STABILITY: FOOD INSECURITY: WITHIN THE PAST 12 MONTHS, THE FOOD YOU BOUGHT JUST DIDN'T LAST AND YOU DIDN'T HAVE MONEY TO GET MORE.: NEVER TRUE

## 2025-02-24 SDOH — ECONOMIC STABILITY: FOOD INSECURITY: WITHIN THE PAST 12 MONTHS, YOU WORRIED THAT YOUR FOOD WOULD RUN OUT BEFORE YOU GOT MONEY TO BUY MORE.: NEVER TRUE

## 2025-02-24 ASSESSMENT — PATIENT HEALTH QUESTIONNAIRE - PHQ9
8. MOVING OR SPEAKING SO SLOWLY THAT OTHER PEOPLE COULD HAVE NOTICED. OR THE OPPOSITE, BEING SO FIGETY OR RESTLESS THAT YOU HAVE BEEN MOVING AROUND A LOT MORE THAN USUAL: NOT AT ALL
SUM OF ALL RESPONSES TO PHQ QUESTIONS 1-9: 4
3. TROUBLE FALLING OR STAYING ASLEEP: SEVERAL DAYS
SUM OF ALL RESPONSES TO PHQ9 QUESTIONS 1 & 2: 2
SUM OF ALL RESPONSES TO PHQ QUESTIONS 1-9: 4
7. TROUBLE CONCENTRATING ON THINGS, SUCH AS READING THE NEWSPAPER OR WATCHING TELEVISION: NOT AT ALL
10. IF YOU CHECKED OFF ANY PROBLEMS, HOW DIFFICULT HAVE THESE PROBLEMS MADE IT FOR YOU TO DO YOUR WORK, TAKE CARE OF THINGS AT HOME, OR GET ALONG WITH OTHER PEOPLE: SOMEWHAT DIFFICULT
2. FEELING DOWN, DEPRESSED OR HOPELESS: SEVERAL DAYS
5. POOR APPETITE OR OVEREATING: SEVERAL DAYS
6. FEELING BAD ABOUT YOURSELF - OR THAT YOU ARE A FAILURE OR HAVE LET YOURSELF OR YOUR FAMILY DOWN: NOT AT ALL
1. LITTLE INTEREST OR PLEASURE IN DOING THINGS: SEVERAL DAYS
4. FEELING TIRED OR HAVING LITTLE ENERGY: NOT AT ALL
SUM OF ALL RESPONSES TO PHQ QUESTIONS 1-9: 4
SUM OF ALL RESPONSES TO PHQ QUESTIONS 1-9: 4
9. THOUGHTS THAT YOU WOULD BE BETTER OFF DEAD, OR OF HURTING YOURSELF: NOT AT ALL

## 2025-02-24 ASSESSMENT — ENCOUNTER SYMPTOMS: SHORTNESS OF BREATH: 0

## 2025-02-24 NOTE — PROGRESS NOTES
Medicare Annual Wellness Visit Express Report     Sexual Activity    Not currently sexually active; Partners: Female.     Sexual Activity Last Reviewed    Sexual Activity    Reviewed By Date/Time   Sharonda Romero LPN 2/24/2025  9:55 AM   Dalia Mackenzie LPN 8/15/2023  1:29 PM   Janet Elena LPN 5/4/2023 11:09 AM     Fall Risk Screening Results    Flowsheet [facility] Office Visit from 2/24/2025 in [facility] Office Visit from 12/15/2023 in [facility]   Fall Risk Assessment     Do you feel unsteady or are you worried about falling? no no   2 or more falls in past year? yes Abnormal  no   Fall with injury in past year? no no   Timed Up and Go Test > 12 seconds? -- --     Cognitive Screening Results    Flowsheet [facility] Office Visit from 2/24/2025 in [facility] Office Visit from 5/4/2023 in [facility]   Cognitive Screening: Mini-Cog     Cognitive Unable to Assess -- --   Clock Drawing Test (CDT) Normal Normal   Words recalled 1 Word Recalled 3 Words Recalled   Total Score 3 5   Total Score Interpretation Normal Mini-Cog Normal Mini-Cog     Depression Screening Results    Flowsheet [facility] Office Visit from 2/24/2025 in [facility] Office Visit from 6/10/2024 in [facility]   PHQ-2 Over the past 2 weeks, how often have you been bothered by any of the following problems?     Little interest or pleasure in doing things Several days --   Feeling down, depressed, or hopeless Several days Not at all   PHQ-2 Score 2 --   PHQ-9 Over the past 2 weeks, how often have you been bothered by any of the following problems?     Trouble falling or staying asleep, or sleeping too much Several days  [falling asleep] --   Feeling tired or having little energy Not at all --   Poor appetite or overeating Several days  [overeating] --   Feeling bad about yourself - or that you are a failure or have let yourself or your family down Not at all -- 
\"Have you been to the ER, urgent care clinic since your last visit?  Hospitalized since your last visit?\"    Yes- 10-2-24 [facility] Dx: Benign paroxysmal positional vertigo, unspecified laterality     “Have you seen or consulted any other health care providers outside our system since your last visit?”    NO             
distress.     Appearance: Normal appearance. She is obese. She is not ill-appearing, toxic-appearing or diaphoretic.   HENT:      Head: Normocephalic and atraumatic.      Right Ear: Tympanic membrane, ear canal and external ear normal. There is no impacted cerumen.      Left Ear: Tympanic membrane, ear canal and external ear normal. There is no impacted cerumen.   Eyes:      Extraocular Movements: Extraocular movements intact.      Conjunctiva/sclera: Conjunctivae normal.      Pupils: Pupils are equal, round, and reactive to light.   Cardiovascular:      Rate and Rhythm: Normal rate and regular rhythm.      Heart sounds: Normal heart sounds. No murmur heard.     No friction rub. No gallop.   Pulmonary:      Effort: Pulmonary effort is normal. No respiratory distress.      Breath sounds: Normal breath sounds. No stridor. No wheezing, rhonchi or rales.   Abdominal:      General: Bowel sounds are normal. There is no distension.      Palpations: Abdomen is soft. There is no mass.      Tenderness: There is no abdominal tenderness. There is no guarding or rebound.      Hernia: No hernia is present.   Musculoskeletal:         General: No tenderness.      Cervical back: Normal range of motion.      Right lower leg: No edema.      Left lower leg: No edema.      Right foot: Normal range of motion. Bunion present. No deformity or foot drop.      Left foot: Normal range of motion. No deformity, bunion or foot drop.   Feet:      Right foot:      Protective Sensation: 10 sites tested.  10 sites sensed.      Skin integrity: No ulcer, blister, skin breakdown, erythema, warmth, callus, dry skin or fissure.      Toenail Condition: Right toenails are ingrown.      Left foot:      Protective Sensation: 10 sites tested.  10 sites sensed.      Skin integrity: No ulcer, blister, skin breakdown, erythema, warmth, callus, dry skin or fissure.      Toenail Condition: Left toenails are normal.   Skin:     General: Skin is warm.      Findings:

## 2025-02-24 NOTE — PATIENT INSTRUCTIONS
information.    Personalized Preventive Plan for Trudy Monk - 2/24/2025  Medicare offers a range of preventive health benefits. Some of the tests and screenings are paid in full while other may be subject to a deductible, co-insurance, and/or copay.  Some of these benefits include a comprehensive review of your medical history including lifestyle, illnesses that may run in your family, and various assessments and screenings as appropriate.  After reviewing your medical record and screening and assessments performed today your provider may have ordered immunizations, labs, imaging, and/or referrals for you.  A list of these orders (if applicable) as well as your Preventive Care list are included within your After Visit Summary for your review.

## 2025-03-07 ENCOUNTER — TELEPHONE (OUTPATIENT)
Facility: CLINIC | Age: 70
End: 2025-03-07

## 2025-03-07 DIAGNOSIS — Z12.11 SCREEN FOR COLON CANCER: ICD-10-CM

## 2025-03-07 DIAGNOSIS — K62.5 RECTAL BLEEDING: Primary | ICD-10-CM

## 2025-03-07 NOTE — TELEPHONE ENCOUNTER
Please advise pt to use specialist she was referred to in the past below and provide contact information  Please fax updated referral      [facility]  [facility]  [address], [facility]  Ph: [phone]  Fax: [phone]

## 2025-03-07 NOTE — TELEPHONE ENCOUNTER
Ms. Monk called and was asking if there was a way she can get a referral to a GI specialist. She said she has been experiencing rectal bleeding and would like to see a specialist regarding this. Thank you

## 2025-03-14 RX ORDER — FERROUS SULFATE 325(65) MG
325 TABLET ORAL 2 TIMES DAILY
Qty: 180 TABLET | Refills: 0 | OUTPATIENT
Start: 2025-03-14

## 2025-03-14 NOTE — TELEPHONE ENCOUNTER
Requested Prescriptions     Pending Prescriptions Disp Refills    ferrous sulfate (FEROSUL) 325 (65 Fe) MG tablet 180 tablet 0     Sig: Take 1 tablet by mouth in the morning and at bedtime     Last seen: 2/24/25  Next seen: 5/5/25    Last filled: 1/23/25 Qty 90 w/0 refills

## 2025-03-17 RX ORDER — FERROUS SULFATE 325(65) MG
325 TABLET ORAL 2 TIMES DAILY
Qty: 180 TABLET | Refills: 0 | OUTPATIENT
Start: 2025-03-17

## 2025-03-18 ENCOUNTER — TELEPHONE (OUTPATIENT)
Facility: CLINIC | Age: 70
End: 2025-03-18

## 2025-03-18 NOTE — TELEPHONE ENCOUNTER
Staff did not get contact information for callback      I called patient to advise her of correct rx-atorvastatin  I advised her to discard the rosuvastatin.  She expressed understanding  She thanked us for the call

## 2025-03-19 DIAGNOSIS — R80.9 CONTROLLED TYPE 2 DIABETES MELLITUS WITH MICROALBUMINURIA, WITH LONG-TERM CURRENT USE OF INSULIN (HCC): Primary | ICD-10-CM

## 2025-03-19 DIAGNOSIS — E11.29 CONTROLLED TYPE 2 DIABETES MELLITUS WITH MICROALBUMINURIA, WITH LONG-TERM CURRENT USE OF INSULIN (HCC): Primary | ICD-10-CM

## 2025-03-19 DIAGNOSIS — Z79.4 CONTROLLED TYPE 2 DIABETES MELLITUS WITH MICROALBUMINURIA, WITH LONG-TERM CURRENT USE OF INSULIN (HCC): Primary | ICD-10-CM

## 2025-03-20 ENCOUNTER — TELEPHONE (OUTPATIENT)
Facility: CLINIC | Age: 70
End: 2025-03-20

## 2025-03-20 RX ORDER — FERROUS SULFATE 325(65) MG
TABLET ORAL
Qty: 60 TABLET | Refills: 0 | Status: SHIPPED | OUTPATIENT
Start: 2025-03-20

## 2025-03-20 NOTE — TELEPHONE ENCOUNTER
Ms. Monk called and requested the following medication to be refilled:   - FEROSUL 325 (65 Fe) MG tablet   She stated that her pharmacy has been trying to get in contact with the office regarding a refill on this medication since last week. Thank you

## 2025-03-21 ENCOUNTER — TELEPHONE (OUTPATIENT)
Facility: CLINIC | Age: 70
End: 2025-03-21

## 2025-03-21 NOTE — TELEPHONE ENCOUNTER
Reason for referral: DM/CGM  Referring provider: RAÚL MARTINEZ  Referring provider office: [facility]  Referred to: Ashleigh Dawson PharmD  Status of Patient: New Patient  Length of Appt: 60 minutes  Type of Appt: In Person  and Virtual MyChart (ask if link should be sent to phone or email)  Patient need address: No

## 2025-03-21 NOTE — TELEPHONE ENCOUNTER
**[facility]**    Attempt made to contact patient at the home number.    Left a message requesting a call back at [phone] to schedule the appointment    Candelaria Martin [facility]   Ambulatory Pharmacy Clinical   [phone]  Department, toll free: [phone], option 2

## 2025-03-24 NOTE — TELEPHONE ENCOUNTER
**[facility] is to be [facility] with [facility]**    Second Attempt made to contact patient at the home number.    Left a message requesting a call back at [phone] to schedule the appointment    Candelaria Martin [facility]   Ambulatory Pharmacy Clinical   [phone]  Department, toll free: [phone], option 2

## 2025-03-25 NOTE — TELEPHONE ENCOUNTER
**[facility] is to [facility] with [facility]**    Third Attempt made to contact patient at the home number.    Left a message requesting a call back at [phone] to schedule the appointment    Letter Sent    Candelaria Martin [facility]   Ambulatory Pharmacy Clinical   [phone]  Department, toll free: [phone], option 2     For Pharmacy Admin Tracking Only    Program: Medical Group  Gap Closed?: No   Time Spent (min): 5

## 2025-04-28 ENCOUNTER — HOSPITAL ENCOUNTER (OUTPATIENT)
Facility: HOSPITAL | Age: 70
Setting detail: SPECIMEN
Discharge: HOME | End: 2025-05-01
Payer: MEDICARE

## 2025-04-28 DIAGNOSIS — E11.69 HYPERLIPIDEMIA ASSOCIATED WITH TYPE 2 DIABETES MELLITUS (HCC): ICD-10-CM

## 2025-04-28 DIAGNOSIS — E61.1 IRON DEFICIENCY: ICD-10-CM

## 2025-04-28 DIAGNOSIS — E78.5 HYPERLIPIDEMIA ASSOCIATED WITH TYPE 2 DIABETES MELLITUS (HCC): ICD-10-CM

## 2025-04-28 LAB
ALBUMIN SERPL-MCNC: 3.5 G/DL (ref 3.4–5)
ALBUMIN/GLOB SERPL: 1.2 (ref 0.8–1.7)
ALP SERPL-CCNC: 122 U/L (ref 45–117)
ALT SERPL-CCNC: 23 U/L (ref 13–56)
AST SERPL-CCNC: 8 U/L (ref 10–38)
BILIRUB DIRECT SERPL-MCNC: <0.1 MG/DL (ref 0–0.2)
BILIRUB SERPL-MCNC: 0.5 MG/DL (ref 0.2–1)
CHOLEST SERPL-MCNC: 120 MG/DL
FERRITIN SERPL-MCNC: 150 NG/ML (ref 8–388)
GLOBULIN SER CALC-MCNC: 3 G/DL (ref 2–4)
HDLC SERPL-MCNC: 52 MG/DL (ref 40–60)
HDLC SERPL: 2.3 (ref 0–5)
IRON SATN MFR SERPL: 21 % (ref 20–50)
IRON SERPL-MCNC: 56 UG/DL (ref 50–175)
LDLC SERPL CALC-MCNC: 47.4 MG/DL (ref 0–100)
LIPID PANEL: NORMAL
PROT SERPL-MCNC: 6.5 G/DL (ref 6.4–8.2)
TIBC SERPL-MCNC: 264 UG/DL (ref 250–450)
TRIGL SERPL-MCNC: 103 MG/DL
VLDLC SERPL CALC-MCNC: 20.6 MG/DL

## 2025-04-28 PROCEDURE — 36415 COLL VENOUS BLD VENIPUNCTURE: CPT

## 2025-04-28 PROCEDURE — 82728 ASSAY OF FERRITIN: CPT

## 2025-04-28 PROCEDURE — 80061 LIPID PANEL: CPT

## 2025-04-28 PROCEDURE — 83540 ASSAY OF IRON: CPT

## 2025-04-28 PROCEDURE — 80076 HEPATIC FUNCTION PANEL: CPT

## 2025-04-28 PROCEDURE — 83550 IRON BINDING TEST: CPT

## 2025-06-20 ENCOUNTER — OFFICE VISIT (OUTPATIENT)
Facility: CLINIC | Age: 70
End: 2025-06-20
Payer: MEDICARE

## 2025-06-20 VITALS
HEIGHT: 64 IN | BODY MASS INDEX: 33.19 KG/M2 | SYSTOLIC BLOOD PRESSURE: 136 MMHG | OXYGEN SATURATION: 96 % | DIASTOLIC BLOOD PRESSURE: 84 MMHG | WEIGHT: 194.4 LBS | HEART RATE: 78 BPM | RESPIRATION RATE: 16 BRPM

## 2025-06-20 DIAGNOSIS — F33.1 MODERATE EPISODE OF RECURRENT MAJOR DEPRESSIVE DISORDER (HCC): ICD-10-CM

## 2025-06-20 DIAGNOSIS — E11.69 HYPERLIPIDEMIA ASSOCIATED WITH TYPE 2 DIABETES MELLITUS (HCC): ICD-10-CM

## 2025-06-20 DIAGNOSIS — F20.9 SCHIZOPHRENIA, UNSPECIFIED TYPE (HCC): ICD-10-CM

## 2025-06-20 DIAGNOSIS — I10 PRIMARY HYPERTENSION: ICD-10-CM

## 2025-06-20 DIAGNOSIS — R21 SKIN RASH: ICD-10-CM

## 2025-06-20 DIAGNOSIS — E55.9 VITAMIN D DEFICIENCY: ICD-10-CM

## 2025-06-20 DIAGNOSIS — K62.5 RECTAL BLEEDING: ICD-10-CM

## 2025-06-20 DIAGNOSIS — E78.5 HYPERLIPIDEMIA ASSOCIATED WITH TYPE 2 DIABETES MELLITUS (HCC): ICD-10-CM

## 2025-06-20 DIAGNOSIS — Z87.891 FORMER SMOKER: ICD-10-CM

## 2025-06-20 DIAGNOSIS — Z79.4 CONTROLLED TYPE 2 DIABETES MELLITUS WITH COMPLICATION, WITH LONG-TERM CURRENT USE OF INSULIN (HCC): Primary | ICD-10-CM

## 2025-06-20 DIAGNOSIS — E11.8 CONTROLLED TYPE 2 DIABETES MELLITUS WITH COMPLICATION, WITH LONG-TERM CURRENT USE OF INSULIN (HCC): Primary | ICD-10-CM

## 2025-06-20 PROBLEM — R74.8 ELEVATED ALKALINE PHOSPHATASE LEVEL: Status: ACTIVE | Noted: 2025-06-20

## 2025-06-20 PROBLEM — Z72.0 TOBACCO USE: Status: RESOLVED | Noted: 2023-08-15 | Resolved: 2025-06-20

## 2025-06-20 PROCEDURE — 1160F RVW MEDS BY RX/DR IN RCRD: CPT | Performed by: FAMILY MEDICINE

## 2025-06-20 PROCEDURE — G8417 CALC BMI ABV UP PARAM F/U: HCPCS | Performed by: FAMILY MEDICINE

## 2025-06-20 PROCEDURE — 1036F TOBACCO NON-USER: CPT | Performed by: FAMILY MEDICINE

## 2025-06-20 PROCEDURE — 1123F ACP DISCUSS/DSCN MKR DOCD: CPT | Performed by: FAMILY MEDICINE

## 2025-06-20 PROCEDURE — 3079F DIAST BP 80-89 MM HG: CPT | Performed by: FAMILY MEDICINE

## 2025-06-20 PROCEDURE — 1125F AMNT PAIN NOTED PAIN PRSNT: CPT | Performed by: FAMILY MEDICINE

## 2025-06-20 PROCEDURE — 2022F DILAT RTA XM EVC RTNOPTHY: CPT | Performed by: FAMILY MEDICINE

## 2025-06-20 PROCEDURE — G8427 DOCREV CUR MEDS BY ELIG CLIN: HCPCS | Performed by: FAMILY MEDICINE

## 2025-06-20 PROCEDURE — 3075F SYST BP GE 130 - 139MM HG: CPT | Performed by: FAMILY MEDICINE

## 2025-06-20 PROCEDURE — 99214 OFFICE O/P EST MOD 30 MIN: CPT | Performed by: FAMILY MEDICINE

## 2025-06-20 PROCEDURE — 3046F HEMOGLOBIN A1C LEVEL >9.0%: CPT | Performed by: FAMILY MEDICINE

## 2025-06-20 PROCEDURE — 3017F COLORECTAL CA SCREEN DOC REV: CPT | Performed by: FAMILY MEDICINE

## 2025-06-20 PROCEDURE — G8399 PT W/DXA RESULTS DOCUMENT: HCPCS | Performed by: FAMILY MEDICINE

## 2025-06-20 PROCEDURE — 1090F PRES/ABSN URINE INCON ASSESS: CPT | Performed by: FAMILY MEDICINE

## 2025-06-20 PROCEDURE — 1159F MED LIST DOCD IN RCRD: CPT | Performed by: FAMILY MEDICINE

## 2025-06-20 RX ORDER — TRIAMCINOLONE ACETONIDE 1 MG/G
OINTMENT TOPICAL 2 TIMES DAILY
Qty: 15 G | Refills: 0 | Status: SHIPPED | OUTPATIENT
Start: 2025-06-20 | End: 2025-06-27

## 2025-06-20 RX ORDER — ACETAMINOPHEN 160 MG
TABLET,DISINTEGRATING ORAL
Qty: 90 CAPSULE | Refills: 3 | Status: SHIPPED | OUTPATIENT
Start: 2025-06-20

## 2025-06-20 ASSESSMENT — PATIENT HEALTH QUESTIONNAIRE - PHQ9
SUM OF ALL RESPONSES TO PHQ QUESTIONS 1-9: 14
SUM OF ALL RESPONSES TO PHQ QUESTIONS 1-9: 14
4. FEELING TIRED OR HAVING LITTLE ENERGY: NEARLY EVERY DAY
1. LITTLE INTEREST OR PLEASURE IN DOING THINGS: NOT AT ALL
5. POOR APPETITE OR OVEREATING: MORE THAN HALF THE DAYS
6. FEELING BAD ABOUT YOURSELF - OR THAT YOU ARE A FAILURE OR HAVE LET YOURSELF OR YOUR FAMILY DOWN: NEARLY EVERY DAY
10. IF YOU CHECKED OFF ANY PROBLEMS, HOW DIFFICULT HAVE THESE PROBLEMS MADE IT FOR YOU TO DO YOUR WORK, TAKE CARE OF THINGS AT HOME, OR GET ALONG WITH OTHER PEOPLE: VERY DIFFICULT
3. TROUBLE FALLING OR STAYING ASLEEP: NEARLY EVERY DAY
2. FEELING DOWN, DEPRESSED OR HOPELESS: NEARLY EVERY DAY
9. THOUGHTS THAT YOU WOULD BE BETTER OFF DEAD, OR OF HURTING YOURSELF: NOT AT ALL
7. TROUBLE CONCENTRATING ON THINGS, SUCH AS READING THE NEWSPAPER OR WATCHING TELEVISION: NOT AT ALL
SUM OF ALL RESPONSES TO PHQ QUESTIONS 1-9: 14
8. MOVING OR SPEAKING SO SLOWLY THAT OTHER PEOPLE COULD HAVE NOTICED. OR THE OPPOSITE, BEING SO FIGETY OR RESTLESS THAT YOU HAVE BEEN MOVING AROUND A LOT MORE THAN USUAL: NOT AT ALL
SUM OF ALL RESPONSES TO PHQ QUESTIONS 1-9: 14

## 2025-06-20 ASSESSMENT — ENCOUNTER SYMPTOMS: SHORTNESS OF BREATH: 0

## 2025-06-20 NOTE — PROGRESS NOTES
Trudy Monk is a 70 y.o. female (: 1955) presenting to address:    Chief Complaint   Patient presents with    Discuss Labs     Left Arm Rash- Medication Cause?       Vitals:    25 1203   BP: (!) 141/87   Pulse: 78   Resp: 16   SpO2: 96%       \"Have you been to the ER, urgent care clinic since your last visit?  Hospitalized since your last visit?\"    NO    “Have you seen or consulted any other health care providers outside of [facility] since your last visit?”    NO

## 2025-06-20 NOTE — PATIENT INSTRUCTIONS
[facility]  [facility] -Nutrition  Lifestyle & Wellness  Diabetes & Nutrition Education  [facility] [place]  [address] [place] [address]  ph: [phone]  Fax:[phone]      [facility]   [facility]   [address], [facility]   Ph: [phone]   Fax: [phone]

## 2025-06-20 NOTE — PROGRESS NOTES
[address] [place] [id number] [phone]        Trudy Monk is a 70 y.o. female and presents with Discuss Labs (Left Arm Rash- Medication Cause?)           Assessment/Plan:  Trudy was seen today for discuss labs.    Diagnoses and all orders for this visit:    Controlled type 2 diabetes mellitus with complication, with long-term current use of insulin (HCC)  -     TSH; Future  -     Albumin/Creatinine Ratio, Urine; Future  -     Hemoglobin A1C; Future  -     Comprehensive Metabolic Panel; Future  -     CBC with Auto Differential; Future  -     Urinalysis with Microscopic; Future    Schizophrenia, unspecified type (HCC)  -     External Referral To Psychiatry  -     External Referral To Psychology    Primary hypertension    Hyperlipidemia associated with type 2 diabetes mellitus (HCC)    Rectal bleeding    Skin rash  -     triamcinolone (KENALOG) 0.1 % ointment; Apply topically 2 times daily for 7 days Max 7 days then discontinue    Moderate episode of recurrent major depressive disorder (HCC)  -     External Referral To Psychiatry  -     External Referral To Psychology    Vitamin D deficiency  -     vitamin D (VITAMIN D3) 50 MCG (2000 UT) CAPS capsule; Take one capsule daily po    Former smoker      DM2: update labs; states sugars have been good  Schizophrenia/depression: PHQ-14; encouraged counseling; had difficult time with [employer]; refer to in person provider  HTN: controlled but borderline;advised to take meds daily  HLD: LDL now at goal; continue current meds  Advised to keep Gastro appt for colonoscopy; got pushed back to November  Begin triamcinolone for skin rash  States she ran out of D3; likely cause of elevated alk phos; advised to restart; repeat labs with next lab draw  Congratulated on quitting smoking      Follow up and disposition:   Return in about 1 week (around 6/27/2025), or if symptoms worsen or fail to improve, for labs, follow up -15

## 2025-06-23 ENCOUNTER — HOSPITAL ENCOUNTER (OUTPATIENT)
Facility: HOSPITAL | Age: 70
Setting detail: SPECIMEN
Discharge: HOME | End: 2025-06-26
Payer: MEDICARE

## 2025-06-23 DIAGNOSIS — Z79.4 CONTROLLED TYPE 2 DIABETES MELLITUS WITH COMPLICATION, WITH LONG-TERM CURRENT USE OF INSULIN (HCC): ICD-10-CM

## 2025-06-23 DIAGNOSIS — E11.8 CONTROLLED TYPE 2 DIABETES MELLITUS WITH COMPLICATION, WITH LONG-TERM CURRENT USE OF INSULIN (HCC): ICD-10-CM

## 2025-06-23 LAB
ALBUMIN SERPL-MCNC: 3.5 G/DL (ref 3.4–5)
ALBUMIN/GLOB SERPL: 1.2 (ref 0.8–1.7)
ALP SERPL-CCNC: 110 U/L (ref 45–117)
ALT SERPL-CCNC: 21 U/L (ref 10–35)
ANION GAP SERPL CALC-SCNC: 11 MMOL/L (ref 3–18)
APPEARANCE UR: CLEAR
AST SERPL-CCNC: 13 U/L (ref 10–38)
BACTERIA URNS QL MICRO: ABNORMAL /HPF
BASOPHILS # BLD: 0.07 K/UL (ref 0–0.1)
BASOPHILS NFR BLD: 0.8 % (ref 0–2)
BILIRUB SERPL-MCNC: 0.2 MG/DL (ref 0.2–1)
BILIRUB UR QL: NEGATIVE
BUN SERPL-MCNC: 18 MG/DL (ref 6–23)
BUN/CREAT SERPL: 17 (ref 12–20)
CALCIUM SERPL-MCNC: 9.6 MG/DL (ref 8.5–10.1)
CHLORIDE SERPL-SCNC: 101 MMOL/L (ref 98–107)
CO2 SERPL-SCNC: 28 MMOL/L (ref 21–32)
COLOR UR: YELLOW
CREAT SERPL-MCNC: 1.02 MG/DL (ref 0.6–1.3)
CREAT UR-MCNC: 130 MG/DL (ref 30–125)
DIFFERENTIAL METHOD BLD: NORMAL
EOSINOPHIL # BLD: 0.18 K/UL (ref 0–0.4)
EOSINOPHIL NFR BLD: 2 % (ref 0–5)
EPITH CASTS URNS QL MICRO: ABNORMAL /LPF (ref 0–5)
ERYTHROCYTE [DISTWIDTH] IN BLOOD BY AUTOMATED COUNT: 11.8 % (ref 11.6–14.5)
EST. AVERAGE GLUCOSE BLD GHB EST-MCNC: 249 MG/DL
GLOBULIN SER CALC-MCNC: 3 G/DL (ref 2–4)
GLUCOSE SERPL-MCNC: 291 MG/DL (ref 74–108)
GLUCOSE UR STRIP.AUTO-MCNC: >1000 MG/DL
HBA1C MFR BLD: 10.3 % (ref 4.2–5.6)
HCT VFR BLD AUTO: 41.7 % (ref 35–45)
HGB BLD-MCNC: 13.1 G/DL (ref 12–16)
HGB UR QL STRIP: NEGATIVE
IMM GRANULOCYTES # BLD AUTO: 0.02 K/UL (ref 0–0.04)
IMM GRANULOCYTES NFR BLD AUTO: 0.2 % (ref 0–0.5)
KETONES UR QL STRIP.AUTO: NEGATIVE MG/DL
LEUKOCYTE ESTERASE UR QL STRIP.AUTO: ABNORMAL
LYMPHOCYTES # BLD: 3.26 K/UL (ref 0.9–3.6)
LYMPHOCYTES NFR BLD: 36.5 % (ref 21–52)
MCH RBC QN AUTO: 29.2 PG (ref 24–34)
MCHC RBC AUTO-ENTMCNC: 31.4 G/DL (ref 31–37)
MCV RBC AUTO: 93.1 FL (ref 78–100)
MICROALBUMIN UR-MCNC: <1.2 MG/DL (ref 0–3)
MICROALBUMIN/CREAT UR-RTO: ABNORMAL MG/G (ref 0–30)
MONOCYTES # BLD: 0.7 K/UL (ref 0.05–1.2)
MONOCYTES NFR BLD: 7.8 % (ref 3–10)
NEUTS SEG # BLD: 4.71 K/UL (ref 1.8–8)
NEUTS SEG NFR BLD: 52.7 % (ref 40–73)
NITRITE UR QL STRIP.AUTO: NEGATIVE
NRBC # BLD: 0 K/UL (ref 0–0.01)
NRBC BLD-RTO: 0 PER 100 WBC
PH UR STRIP: 7 (ref 5–8)
PLATELET # BLD AUTO: 351 K/UL (ref 135–420)
PMV BLD AUTO: 10.5 FL (ref 9.2–11.8)
POTASSIUM SERPL-SCNC: 4.4 MMOL/L (ref 3.5–5.5)
PROT SERPL-MCNC: 6.4 G/DL (ref 6.4–8.2)
PROT UR STRIP-MCNC: NEGATIVE MG/DL
RBC # BLD AUTO: 4.48 M/UL (ref 4.2–5.3)
RBC #/AREA URNS HPF: NEGATIVE /HPF (ref 0–5)
SODIUM SERPL-SCNC: 139 MMOL/L (ref 136–145)
SP GR UR REFRACTOMETRY: 1.02 (ref 1–1.03)
TSH, 3RD GENERATION: 1.82 UIU/ML (ref 0.27–4.2)
UROBILINOGEN UR QL STRIP.AUTO: 1 EU/DL (ref 0.2–1)
WBC # BLD AUTO: 8.9 K/UL (ref 4.6–13.2)
WBC URNS QL MICRO: ABNORMAL /HPF (ref 0–4)

## 2025-06-23 PROCEDURE — 85025 COMPLETE CBC W/AUTO DIFF WBC: CPT

## 2025-06-23 PROCEDURE — 82043 UR ALBUMIN QUANTITATIVE: CPT

## 2025-06-23 PROCEDURE — 36415 COLL VENOUS BLD VENIPUNCTURE: CPT

## 2025-06-23 PROCEDURE — 80053 COMPREHEN METABOLIC PANEL: CPT

## 2025-06-23 PROCEDURE — 81001 URINALYSIS AUTO W/SCOPE: CPT

## 2025-06-23 PROCEDURE — 82570 ASSAY OF URINE CREATININE: CPT

## 2025-06-23 PROCEDURE — 83036 HEMOGLOBIN GLYCOSYLATED A1C: CPT

## 2025-06-23 PROCEDURE — 84443 ASSAY THYROID STIM HORMONE: CPT

## 2025-07-22 ENCOUNTER — OFFICE VISIT (OUTPATIENT)
Facility: CLINIC | Age: 70
End: 2025-07-22
Payer: MEDICARE

## 2025-07-22 VITALS
WEIGHT: 196.4 LBS | HEIGHT: 64 IN | SYSTOLIC BLOOD PRESSURE: 148 MMHG | HEART RATE: 93 BPM | OXYGEN SATURATION: 97 % | BODY MASS INDEX: 33.53 KG/M2 | DIASTOLIC BLOOD PRESSURE: 96 MMHG

## 2025-07-22 DIAGNOSIS — E11.22 CKD STAGE 2 DUE TO TYPE 2 DIABETES MELLITUS (HCC): ICD-10-CM

## 2025-07-22 DIAGNOSIS — E11.65 UNCONTROLLED TYPE 2 DIABETES MELLITUS WITH HYPERGLYCEMIA (HCC): Primary | ICD-10-CM

## 2025-07-22 DIAGNOSIS — F20.9 SCHIZOPHRENIA, UNSPECIFIED TYPE (HCC): ICD-10-CM

## 2025-07-22 DIAGNOSIS — R21 SKIN RASH: ICD-10-CM

## 2025-07-22 DIAGNOSIS — R92.8 ABNORMAL MAMMOGRAM OF LEFT BREAST: ICD-10-CM

## 2025-07-22 DIAGNOSIS — I10 PRIMARY HYPERTENSION: ICD-10-CM

## 2025-07-22 DIAGNOSIS — E55.9 VITAMIN D DEFICIENCY: ICD-10-CM

## 2025-07-22 DIAGNOSIS — N18.2 CKD STAGE 2 DUE TO TYPE 2 DIABETES MELLITUS (HCC): ICD-10-CM

## 2025-07-22 PROCEDURE — 3077F SYST BP >= 140 MM HG: CPT | Performed by: FAMILY MEDICINE

## 2025-07-22 PROCEDURE — G8427 DOCREV CUR MEDS BY ELIG CLIN: HCPCS | Performed by: FAMILY MEDICINE

## 2025-07-22 PROCEDURE — 3046F HEMOGLOBIN A1C LEVEL >9.0%: CPT | Performed by: FAMILY MEDICINE

## 2025-07-22 PROCEDURE — 3080F DIAST BP >= 90 MM HG: CPT | Performed by: FAMILY MEDICINE

## 2025-07-22 PROCEDURE — 3017F COLORECTAL CA SCREEN DOC REV: CPT | Performed by: FAMILY MEDICINE

## 2025-07-22 PROCEDURE — 1159F MED LIST DOCD IN RCRD: CPT | Performed by: FAMILY MEDICINE

## 2025-07-22 PROCEDURE — 1123F ACP DISCUSS/DSCN MKR DOCD: CPT | Performed by: FAMILY MEDICINE

## 2025-07-22 PROCEDURE — 1160F RVW MEDS BY RX/DR IN RCRD: CPT | Performed by: FAMILY MEDICINE

## 2025-07-22 PROCEDURE — 1090F PRES/ABSN URINE INCON ASSESS: CPT | Performed by: FAMILY MEDICINE

## 2025-07-22 PROCEDURE — 99214 OFFICE O/P EST MOD 30 MIN: CPT | Performed by: FAMILY MEDICINE

## 2025-07-22 PROCEDURE — 1036F TOBACCO NON-USER: CPT | Performed by: FAMILY MEDICINE

## 2025-07-22 PROCEDURE — G8399 PT W/DXA RESULTS DOCUMENT: HCPCS | Performed by: FAMILY MEDICINE

## 2025-07-22 PROCEDURE — G8417 CALC BMI ABV UP PARAM F/U: HCPCS | Performed by: FAMILY MEDICINE

## 2025-07-22 PROCEDURE — 2022F DILAT RTA XM EVC RTNOPTHY: CPT | Performed by: FAMILY MEDICINE

## 2025-07-22 RX ORDER — TELMISARTAN 80 MG/1
80 TABLET ORAL DAILY
Qty: 90 TABLET | Refills: 2 | Status: SHIPPED | OUTPATIENT
Start: 2025-07-22

## 2025-07-22 RX ORDER — CLOTRIMAZOLE AND BETAMETHASONE DIPROPIONATE 10; .64 MG/G; MG/G
CREAM TOPICAL
Qty: 45 G | Refills: 0 | Status: SHIPPED | OUTPATIENT
Start: 2025-07-22

## 2025-07-22 ASSESSMENT — PATIENT HEALTH QUESTIONNAIRE - PHQ9
1. LITTLE INTEREST OR PLEASURE IN DOING THINGS: SEVERAL DAYS
SUM OF ALL RESPONSES TO PHQ QUESTIONS 1-9: 7
5. POOR APPETITE OR OVEREATING: NOT AT ALL
SUM OF ALL RESPONSES TO PHQ QUESTIONS 1-9: 7
2. FEELING DOWN, DEPRESSED OR HOPELESS: NEARLY EVERY DAY
9. THOUGHTS THAT YOU WOULD BE BETTER OFF DEAD, OR OF HURTING YOURSELF: NOT AT ALL
SUM OF ALL RESPONSES TO PHQ QUESTIONS 1-9: 7
7. TROUBLE CONCENTRATING ON THINGS, SUCH AS READING THE NEWSPAPER OR WATCHING TELEVISION: NOT AT ALL
8. MOVING OR SPEAKING SO SLOWLY THAT OTHER PEOPLE COULD HAVE NOTICED. OR THE OPPOSITE, BEING SO FIGETY OR RESTLESS THAT YOU HAVE BEEN MOVING AROUND A LOT MORE THAN USUAL: NOT AT ALL
SUM OF ALL RESPONSES TO PHQ QUESTIONS 1-9: 7
6. FEELING BAD ABOUT YOURSELF - OR THAT YOU ARE A FAILURE OR HAVE LET YOURSELF OR YOUR FAMILY DOWN: NOT AT ALL
10. IF YOU CHECKED OFF ANY PROBLEMS, HOW DIFFICULT HAVE THESE PROBLEMS MADE IT FOR YOU TO DO YOUR WORK, TAKE CARE OF THINGS AT HOME, OR GET ALONG WITH OTHER PEOPLE: NOT DIFFICULT AT ALL
3. TROUBLE FALLING OR STAYING ASLEEP: NEARLY EVERY DAY
4. FEELING TIRED OR HAVING LITTLE ENERGY: NOT AT ALL

## 2025-07-22 ASSESSMENT — ENCOUNTER SYMPTOMS: SHORTNESS OF BREATH: 0

## 2025-07-22 NOTE — PATIENT INSTRUCTIONS
[facility]  [address] [place] [address]  Phone: [phone]  Fax: [phone]

## 2025-07-22 NOTE — PROGRESS NOTES
[address] [place] [id number]               [phone]        Trudy Monk is a 70 y.o. female and presents with Follow-up           Assessment/Plan:  Trudy was seen today for follow-up.    Diagnoses and all orders for this visit:    Uncontrolled type 2 diabetes mellitus with hyperglycemia (HCC)    Schizophrenia, unspecified type (HCC)    Vitamin D deficiency    CKD stage 2 due to type 2 diabetes mellitus (HCC)  -     dulaglutide (TRULICITY) 0.75 MG/0.5ML SOAJ SC injection; Inject 0.5 mLs into the skin every 7 days    Skin rash  -     clotrimazole-betamethasone (LOTRISONE) 1-0.05 % cream; Apply topically 2 times daily for max 2 weeks    Abnormal mammogram of left breast  -     VICK KODY DIGITAL DIAGNOSTIC BILATERAL; Future  -     US BREAST COMPLETE LEFT; Future  -     US BREAST COMPLETE RIGHT; Future    Primary hypertension  -     telmisartan (MICARDIS) 80 MG tablet; Take 1 tablet by mouth daily      DM2: uncontrolled; add Trulicity once weekly; f/u in 4 weeks; advised to stop muffins/sodas in her diet  Schizophrenia: denies auditory or visual hallucinations; used to see shadows; given street contact info to go to office for intake information due to difficulty over the phone  Vitamin D: continue supplementation  CKD now stage 2; improving; continue to work on DM management to improve this  Skin rash: begin lotrisone topically; f/u in 1 week if no improvement; may be fungal; +itching and no resolution with steroid treatment alone  L breast mammogram: advised to update mammogram overdue  HTN: uncontrolled; begin telmisartan 80 mg daily; f/u in 4 weeks; states she took her medications yesterday      Follow up and disposition:   Return in about 4 weeks (around 8/19/2025), or if symptoms worsen or fail to improve, for follow up -15 min, diabetes.      Health Maintenance:   Health Maintenance   Topic Date Due    Breast cancer screen  07/25/2025    DTaP/Tdap/Td vaccine (1 -

## 2025-08-20 ENCOUNTER — OFFICE VISIT (OUTPATIENT)
Facility: CLINIC | Age: 70
End: 2025-08-20

## 2025-08-20 VITALS
HEIGHT: 64 IN | WEIGHT: 194.6 LBS | BODY MASS INDEX: 33.22 KG/M2 | SYSTOLIC BLOOD PRESSURE: 138 MMHG | RESPIRATION RATE: 16 BRPM | OXYGEN SATURATION: 95 % | DIASTOLIC BLOOD PRESSURE: 88 MMHG | HEART RATE: 86 BPM

## 2025-08-20 DIAGNOSIS — M79.645 BILATERAL THUMB PAIN: ICD-10-CM

## 2025-08-20 DIAGNOSIS — N64.4 BREAST PAIN: ICD-10-CM

## 2025-08-20 DIAGNOSIS — E11.65 UNCONTROLLED TYPE 2 DIABETES MELLITUS WITH HYPERGLYCEMIA (HCC): ICD-10-CM

## 2025-08-20 DIAGNOSIS — M79.644 BILATERAL THUMB PAIN: ICD-10-CM

## 2025-08-20 DIAGNOSIS — I10 PRIMARY HYPERTENSION: Primary | ICD-10-CM

## 2025-08-20 ASSESSMENT — PATIENT HEALTH QUESTIONNAIRE - PHQ9
SUM OF ALL RESPONSES TO PHQ QUESTIONS 1-9: 0
SUM OF ALL RESPONSES TO PHQ QUESTIONS 1-9: 0
2. FEELING DOWN, DEPRESSED OR HOPELESS: NOT AT ALL
1. LITTLE INTEREST OR PLEASURE IN DOING THINGS: NOT AT ALL
SUM OF ALL RESPONSES TO PHQ QUESTIONS 1-9: 0
SUM OF ALL RESPONSES TO PHQ QUESTIONS 1-9: 0

## 2025-08-20 ASSESSMENT — ENCOUNTER SYMPTOMS: SHORTNESS OF BREATH: 0
